# Patient Record
Sex: MALE | Race: WHITE | NOT HISPANIC OR LATINO | Employment: OTHER | URBAN - METROPOLITAN AREA
[De-identification: names, ages, dates, MRNs, and addresses within clinical notes are randomized per-mention and may not be internally consistent; named-entity substitution may affect disease eponyms.]

---

## 2023-03-06 ENCOUNTER — HOSPITAL ENCOUNTER (INPATIENT)
Facility: HOSPITAL | Age: 73
LOS: 3 days | Discharge: HOME | DRG: 286 | End: 2023-03-10
Attending: EMERGENCY MEDICINE | Admitting: STUDENT IN AN ORGANIZED HEALTH CARE EDUCATION/TRAINING PROGRAM
Payer: MEDICARE

## 2023-03-06 ENCOUNTER — APPOINTMENT (OUTPATIENT)
Dept: GENERAL RADIOLOGY | Facility: HOSPITAL | Age: 73
DRG: 286 | End: 2023-03-06
Payer: MEDICARE

## 2023-03-06 ENCOUNTER — APPOINTMENT (OUTPATIENT)
Dept: CT IMAGING | Facility: HOSPITAL | Age: 73
DRG: 286 | End: 2023-03-06
Payer: MEDICARE

## 2023-03-06 DIAGNOSIS — I50.9 CONGESTIVE HEART FAILURE, UNSPECIFIED HF CHRONICITY, UNSPECIFIED HEART FAILURE TYPE: ICD-10-CM

## 2023-03-06 DIAGNOSIS — R73.09 ELEVATED GLUCOSE: ICD-10-CM

## 2023-03-06 DIAGNOSIS — R06.00 DYSPNEA, UNSPECIFIED TYPE: Primary | ICD-10-CM

## 2023-03-06 DIAGNOSIS — R79.89 ELEVATED SERUM CREATININE: ICD-10-CM

## 2023-03-06 DIAGNOSIS — R09.89 PULMONARY VASCULAR CONGESTION: ICD-10-CM

## 2023-03-06 DIAGNOSIS — I51.7 CARDIOMEGALY: ICD-10-CM

## 2023-03-06 DIAGNOSIS — R77.8 ELEVATED TROPONIN: ICD-10-CM

## 2023-03-06 DIAGNOSIS — I26.99 ACUTE PULMONARY EMBOLISM WITHOUT ACUTE COR PULMONALE, UNSPECIFIED PULMONARY EMBOLISM TYPE: ICD-10-CM

## 2023-03-06 LAB
ALBUMIN SERPL-MCNC: 4.1 G/DL (ref 3.5–5.2)
ALBUMIN/GLOB SERPL: 1.2 G/DL
ALP SERPL-CCNC: 75 U/L (ref 39–117)
ALT SERPL W P-5'-P-CCNC: 34 U/L (ref 1–41)
ANION GAP SERPL CALCULATED.3IONS-SCNC: 10 MMOL/L (ref 5–15)
ARTERIAL PATENCY WRIST A: POSITIVE
AST SERPL-CCNC: 48 U/L (ref 1–40)
ATMOSPHERIC PRESS: ABNORMAL MM[HG]
B PARAPERT DNA SPEC QL NAA+PROBE: NOT DETECTED
B PERT DNA SPEC QL NAA+PROBE: NOT DETECTED
BASE EXCESS BLDA CALC-SCNC: -2 MMOL/L (ref 0–3)
BASOPHILS # BLD AUTO: 0 10*3/MM3 (ref 0–0.2)
BASOPHILS NFR BLD AUTO: 0.4 % (ref 0–1.5)
BDY SITE: ABNORMAL
BILIRUB SERPL-MCNC: 1.2 MG/DL (ref 0–1.2)
BUN SERPL-MCNC: 20 MG/DL (ref 8–23)
BUN/CREAT SERPL: 15.3 (ref 7–25)
C PNEUM DNA NPH QL NAA+NON-PROBE: NOT DETECTED
CALCIUM SPEC-SCNC: 8.9 MG/DL (ref 8.6–10.5)
CHLORIDE SERPL-SCNC: 100 MMOL/L (ref 98–107)
CO2 BLDA-SCNC: 21 MMOL/L (ref 22–29)
CO2 SERPL-SCNC: 25 MMOL/L (ref 22–29)
CREAT SERPL-MCNC: 1.31 MG/DL (ref 0.76–1.27)
D DIMER PPP FEU-MCNC: 3.1 MG/L (FEU) (ref 0–0.7)
D-LACTATE SERPL-SCNC: 2 MMOL/L (ref 0.3–2)
DEPRECATED RDW RBC AUTO: 51.6 FL (ref 37–54)
EGFRCR SERPLBLD CKD-EPI 2021: 58.6 ML/MIN/1.73
EOSINOPHIL # BLD AUTO: 0 10*3/MM3 (ref 0–0.4)
EOSINOPHIL NFR BLD AUTO: 0.1 % (ref 0.3–6.2)
ERYTHROCYTE [DISTWIDTH] IN BLOOD BY AUTOMATED COUNT: 16.4 % (ref 12.3–15.4)
FLUAV SUBTYP SPEC NAA+PROBE: NOT DETECTED
FLUBV RNA ISLT QL NAA+PROBE: NOT DETECTED
GLOBULIN UR ELPH-MCNC: 3.5 GM/DL
GLUCOSE SERPL-MCNC: 144 MG/DL (ref 65–99)
HADV DNA SPEC NAA+PROBE: NOT DETECTED
HCO3 BLDA-SCNC: 20.2 MMOL/L (ref 21–28)
HCOV 229E RNA SPEC QL NAA+PROBE: NOT DETECTED
HCOV HKU1 RNA SPEC QL NAA+PROBE: NOT DETECTED
HCOV NL63 RNA SPEC QL NAA+PROBE: NOT DETECTED
HCOV OC43 RNA SPEC QL NAA+PROBE: NOT DETECTED
HCT VFR BLD AUTO: 38.9 % (ref 37.5–51)
HEMODILUTION: NO
HGB BLD-MCNC: 12.6 G/DL (ref 13–17.7)
HMPV RNA NPH QL NAA+NON-PROBE: NOT DETECTED
HOLD SPECIMEN: NORMAL
HOLD SPECIMEN: NORMAL
HPIV1 RNA ISLT QL NAA+PROBE: NOT DETECTED
HPIV2 RNA SPEC QL NAA+PROBE: NOT DETECTED
HPIV3 RNA NPH QL NAA+PROBE: NOT DETECTED
HPIV4 P GENE NPH QL NAA+PROBE: NOT DETECTED
INHALED O2 CONCENTRATION: 21 %
LYMPHOCYTES # BLD AUTO: 1.6 10*3/MM3 (ref 0.7–3.1)
LYMPHOCYTES NFR BLD AUTO: 13.7 % (ref 19.6–45.3)
M PNEUMO IGG SER IA-ACNC: NOT DETECTED
MAGNESIUM SERPL-MCNC: 1.8 MG/DL (ref 1.6–2.4)
MCH RBC QN AUTO: 27.8 PG (ref 26.6–33)
MCHC RBC AUTO-ENTMCNC: 32.3 G/DL (ref 31.5–35.7)
MCV RBC AUTO: 86.1 FL (ref 79–97)
MODALITY: ABNORMAL
MONOCYTES # BLD AUTO: 1.6 10*3/MM3 (ref 0.1–0.9)
MONOCYTES NFR BLD AUTO: 13.2 % (ref 5–12)
NEUTROPHILS NFR BLD AUTO: 72.6 % (ref 42.7–76)
NEUTROPHILS NFR BLD AUTO: 8.5 10*3/MM3 (ref 1.7–7)
NRBC BLD AUTO-RTO: 0 /100 WBC (ref 0–0.2)
NT-PROBNP SERPL-MCNC: ABNORMAL PG/ML (ref 0–900)
PCO2 BLDA: 26.9 MM HG (ref 35–48)
PH BLDA: 7.48 PH UNITS (ref 7.35–7.45)
PLATELET # BLD AUTO: 160 10*3/MM3 (ref 140–450)
PMV BLD AUTO: 10.1 FL (ref 6–12)
PO2 BLDA: 76.5 MM HG (ref 83–108)
POTASSIUM SERPL-SCNC: 4.1 MMOL/L (ref 3.5–5.2)
PROT SERPL-MCNC: 7.6 G/DL (ref 6–8.5)
RBC # BLD AUTO: 4.52 10*6/MM3 (ref 4.14–5.8)
RHINOVIRUS RNA SPEC NAA+PROBE: NOT DETECTED
RSV RNA NPH QL NAA+NON-PROBE: NOT DETECTED
SAO2 % BLDCOA: 96.4 % (ref 94–98)
SARS-COV-2 RNA NPH QL NAA+NON-PROBE: NOT DETECTED
SODIUM SERPL-SCNC: 135 MMOL/L (ref 136–145)
TROPONIN T SERPL HS-MCNC: 34 NG/L
WBC NRBC COR # BLD: 11.7 10*3/MM3 (ref 3.4–10.8)
WHOLE BLOOD HOLD COAG: NORMAL
WHOLE BLOOD HOLD SPECIMEN: NORMAL

## 2023-03-06 PROCEDURE — 80053 COMPREHEN METABOLIC PANEL: CPT | Performed by: EMERGENCY MEDICINE

## 2023-03-06 PROCEDURE — 83605 ASSAY OF LACTIC ACID: CPT

## 2023-03-06 PROCEDURE — 93005 ELECTROCARDIOGRAM TRACING: CPT

## 2023-03-06 PROCEDURE — 71045 X-RAY EXAM CHEST 1 VIEW: CPT

## 2023-03-06 PROCEDURE — 93005 ELECTROCARDIOGRAM TRACING: CPT | Performed by: EMERGENCY MEDICINE

## 2023-03-06 PROCEDURE — 85379 FIBRIN DEGRADATION QUANT: CPT | Performed by: NURSE PRACTITIONER

## 2023-03-06 PROCEDURE — 99285 EMERGENCY DEPT VISIT HI MDM: CPT

## 2023-03-06 PROCEDURE — 82803 BLOOD GASES ANY COMBINATION: CPT

## 2023-03-06 PROCEDURE — 84484 ASSAY OF TROPONIN QUANT: CPT | Performed by: NURSE PRACTITIONER

## 2023-03-06 PROCEDURE — 36600 WITHDRAWAL OF ARTERIAL BLOOD: CPT

## 2023-03-06 PROCEDURE — 87040 BLOOD CULTURE FOR BACTERIA: CPT | Performed by: EMERGENCY MEDICINE

## 2023-03-06 PROCEDURE — 36415 COLL VENOUS BLD VENIPUNCTURE: CPT

## 2023-03-06 PROCEDURE — 0202U NFCT DS 22 TRGT SARS-COV-2: CPT | Performed by: NURSE PRACTITIONER

## 2023-03-06 PROCEDURE — 83880 ASSAY OF NATRIURETIC PEPTIDE: CPT | Performed by: NURSE PRACTITIONER

## 2023-03-06 PROCEDURE — 71275 CT ANGIOGRAPHY CHEST: CPT

## 2023-03-06 PROCEDURE — 85025 COMPLETE CBC W/AUTO DIFF WBC: CPT | Performed by: EMERGENCY MEDICINE

## 2023-03-06 PROCEDURE — 83735 ASSAY OF MAGNESIUM: CPT | Performed by: NURSE PRACTITIONER

## 2023-03-06 RX ORDER — SODIUM CHLORIDE 0.9 % (FLUSH) 0.9 %
10 SYRINGE (ML) INJECTION AS NEEDED
Status: DISCONTINUED | OUTPATIENT
Start: 2023-03-06 | End: 2023-03-09 | Stop reason: SDUPTHER

## 2023-03-07 ENCOUNTER — APPOINTMENT (OUTPATIENT)
Dept: NUCLEAR MEDICINE | Facility: HOSPITAL | Age: 73
DRG: 286 | End: 2023-03-07
Payer: MEDICARE

## 2023-03-07 ENCOUNTER — APPOINTMENT (OUTPATIENT)
Dept: CARDIOLOGY | Facility: HOSPITAL | Age: 73
DRG: 286 | End: 2023-03-07
Payer: MEDICARE

## 2023-03-07 PROBLEM — R73.09 ELEVATED GLUCOSE: Status: ACTIVE | Noted: 2023-03-07

## 2023-03-07 PROBLEM — I26.99 ACUTE PULMONARY EMBOLISM: Status: ACTIVE | Noted: 2023-03-07

## 2023-03-07 PROBLEM — R06.00 DYSPNEA, UNSPECIFIED TYPE: Status: ACTIVE | Noted: 2023-03-07

## 2023-03-07 PROBLEM — R79.89 ELEVATED SERUM CREATININE: Status: ACTIVE | Noted: 2023-03-07

## 2023-03-07 PROBLEM — I50.9 CONGESTIVE HEART FAILURE: Status: ACTIVE | Noted: 2023-03-06

## 2023-03-07 PROBLEM — R09.89 PULMONARY VASCULAR CONGESTION: Status: ACTIVE | Noted: 2023-03-07

## 2023-03-07 PROBLEM — R79.89 ELEVATED TROPONIN: Status: ACTIVE | Noted: 2023-03-06

## 2023-03-07 PROBLEM — R77.8 ELEVATED TROPONIN: Status: ACTIVE | Noted: 2023-03-06

## 2023-03-07 PROBLEM — I51.7 CARDIOMEGALY: Status: ACTIVE | Noted: 2023-03-07

## 2023-03-07 LAB
ANION GAP SERPL CALCULATED.3IONS-SCNC: 13 MMOL/L (ref 5–15)
BASOPHILS # BLD AUTO: 0.1 10*3/MM3 (ref 0–0.2)
BASOPHILS NFR BLD AUTO: 0.4 % (ref 0–1.5)
BH CV ECHO MEAS - ACS: 2.4 CM
BH CV ECHO MEAS - AO MAX PG: 3.8 MMHG
BH CV ECHO MEAS - AO MEAN PG: 2.09 MMHG
BH CV ECHO MEAS - AO ROOT DIAM: 3.6 CM
BH CV ECHO MEAS - AO V2 MAX: 97.3 CM/SEC
BH CV ECHO MEAS - AO V2 VTI: 14.6 CM
BH CV ECHO MEAS - AVA(I,D): 4 CM2
BH CV ECHO MEAS - EDV(CUBED): 333 ML
BH CV ECHO MEAS - EDV(MOD-SP4): 213 ML
BH CV ECHO MEAS - EF(MOD-BP): 20 %
BH CV ECHO MEAS - EF(MOD-SP4): 21.6 %
BH CV ECHO MEAS - ESV(CUBED): 239.3 ML
BH CV ECHO MEAS - ESV(MOD-SP4): 167 ML
BH CV ECHO MEAS - FS: 10.4 %
BH CV ECHO MEAS - IVS/LVPW: 1.12 CM
BH CV ECHO MEAS - IVSD: 1.11 CM
BH CV ECHO MEAS - LA A2CS (ATRIAL LENGTH): 5.9 CM
BH CV ECHO MEAS - LV DIASTOLIC VOL/BSA (35-75): 92.7 CM2
BH CV ECHO MEAS - LV MASS(C)D: 335.2 GRAMS
BH CV ECHO MEAS - LV MAX PG: 2.3 MMHG
BH CV ECHO MEAS - LV MEAN PG: 1.43 MMHG
BH CV ECHO MEAS - LV SYSTOLIC VOL/BSA (12-30): 72.6 CM2
BH CV ECHO MEAS - LV V1 MAX: 75.9 CM/SEC
BH CV ECHO MEAS - LV V1 VTI: 12.2 CM
BH CV ECHO MEAS - LVIDD: 6.9 CM
BH CV ECHO MEAS - LVIDS: 6.2 CM
BH CV ECHO MEAS - LVOT AREA: 4.8 CM2
BH CV ECHO MEAS - LVOT DIAM: 2.47 CM
BH CV ECHO MEAS - LVPWD: 0.99 CM
BH CV ECHO MEAS - MR MAX PG: 69.1 MMHG
BH CV ECHO MEAS - MR MAX VEL: 415.7 CM/SEC
BH CV ECHO MEAS - MV A MAX VEL: 71.4 CM/SEC
BH CV ECHO MEAS - MV DEC SLOPE: 1030 CM/SEC2
BH CV ECHO MEAS - MV DEC TIME: 0.11 MSEC
BH CV ECHO MEAS - MV E MAX VEL: 109.5 CM/SEC
BH CV ECHO MEAS - MV E/A: 1.53
BH CV ECHO MEAS - MV MAX PG: 3.4 MMHG
BH CV ECHO MEAS - MV MEAN PG: 1.47 MMHG
BH CV ECHO MEAS - MV V2 VTI: 15.3 CM
BH CV ECHO MEAS - MVA(VTI): 3.8 CM2
BH CV ECHO MEAS - PA V2 MAX: 64.9 CM/SEC
BH CV ECHO MEAS - PI END-D VEL: 137.8 CM/SEC
BH CV ECHO MEAS - QP/QS: 0.79
BH CV ECHO MEAS - RAP SYSTOLE: 3 MMHG
BH CV ECHO MEAS - RV MAX PG: 0.84 MMHG
BH CV ECHO MEAS - RV V1 MAX: 45.8 CM/SEC
BH CV ECHO MEAS - RV V1 VTI: 7.9 CM
BH CV ECHO MEAS - RVDD: 4.3 CM
BH CV ECHO MEAS - RVOT DIAM: 2.7 CM
BH CV ECHO MEAS - RVSP: 34.3 MMHG
BH CV ECHO MEAS - SI(MOD-SP4): 20.1 ML/M2
BH CV ECHO MEAS - SV(LVOT): 58.4 ML
BH CV ECHO MEAS - SV(MOD-SP4): 46.1 ML
BH CV ECHO MEAS - SV(RVOT): 45.9 ML
BH CV ECHO MEAS - TR MAX PG: 31.3 MMHG
BH CV ECHO MEAS - TR MAX VEL: 279.5 CM/SEC
BH CV LOW VAS LEFT GASTRONEMIUS VESSEL: 1
BH CV LOW VAS LEFT GREATER SAPH AK VESSEL: 1
BH CV LOW VAS LEFT GREATER SAPH BK VESSEL: 1
BH CV LOW VAS LEFT LESSER SAPH VESSEL: 1
BH CV LOW VAS LEFT VARICOSITY AK VESSEL: 1
BH CV LOW VAS LEFT VARICOSITY BK VESSEL: 1
BH CV LOW VAS RIGHT GREATER SAPH AK VESSEL: 1
BH CV LOW VAS RIGHT GREATER SAPH BK VESSEL: 1
BH CV LOWER VASCULAR LEFT COMMON FEMORAL AUGMENT: NORMAL
BH CV LOWER VASCULAR LEFT COMMON FEMORAL COMPETENT: NORMAL
BH CV LOWER VASCULAR LEFT COMMON FEMORAL COMPRESS: NORMAL
BH CV LOWER VASCULAR LEFT COMMON FEMORAL PHASIC: NORMAL
BH CV LOWER VASCULAR LEFT COMMON FEMORAL SPONT: NORMAL
BH CV LOWER VASCULAR LEFT DISTAL FEMORAL COMPRESS: NORMAL
BH CV LOWER VASCULAR LEFT GASTRONEMIUS COMPRESS: NORMAL
BH CV LOWER VASCULAR LEFT GASTRONEMIUS THROMBUS: NORMAL
BH CV LOWER VASCULAR LEFT GREATER SAPH AK COMPRESS: NORMAL
BH CV LOWER VASCULAR LEFT GREATER SAPH AK THROMBUS: NORMAL
BH CV LOWER VASCULAR LEFT GREATER SAPH BK COMPRESS: NORMAL
BH CV LOWER VASCULAR LEFT GREATER SAPH BK THROMBUS: NORMAL
BH CV LOWER VASCULAR LEFT LESSER SAPH COMPRESS: NORMAL
BH CV LOWER VASCULAR LEFT LESSER SAPH THROMBUS: NORMAL
BH CV LOWER VASCULAR LEFT MID FEMORAL AUGMENT: NORMAL
BH CV LOWER VASCULAR LEFT MID FEMORAL COMPETENT: NORMAL
BH CV LOWER VASCULAR LEFT MID FEMORAL COMPRESS: NORMAL
BH CV LOWER VASCULAR LEFT MID FEMORAL PHASIC: NORMAL
BH CV LOWER VASCULAR LEFT MID FEMORAL SPONT: NORMAL
BH CV LOWER VASCULAR LEFT PERONEAL COMPRESS: NORMAL
BH CV LOWER VASCULAR LEFT POPLITEAL AUGMENT: NORMAL
BH CV LOWER VASCULAR LEFT POPLITEAL COMPETENT: NORMAL
BH CV LOWER VASCULAR LEFT POPLITEAL COMPRESS: NORMAL
BH CV LOWER VASCULAR LEFT POPLITEAL PHASIC: NORMAL
BH CV LOWER VASCULAR LEFT POPLITEAL SPONT: NORMAL
BH CV LOWER VASCULAR LEFT POSTERIOR TIBIAL COMPRESS: NORMAL
BH CV LOWER VASCULAR LEFT PROXIMAL FEMORAL COMPRESS: NORMAL
BH CV LOWER VASCULAR LEFT SAPHENOFEMORAL JUNCTION COMPRESS: NORMAL
BH CV LOWER VASCULAR LEFT VARICOSITY AK COMPRESS: NORMAL
BH CV LOWER VASCULAR LEFT VARICOSITY AK THROMBUS: NORMAL
BH CV LOWER VASCULAR LEFT VARICOSITY BK COMPRESS: NORMAL
BH CV LOWER VASCULAR LEFT VARICOSITY BK THROMBUS: NORMAL
BH CV LOWER VASCULAR RIGHT COMMON FEMORAL AUGMENT: NORMAL
BH CV LOWER VASCULAR RIGHT COMMON FEMORAL COMPETENT: NORMAL
BH CV LOWER VASCULAR RIGHT COMMON FEMORAL COMPRESS: NORMAL
BH CV LOWER VASCULAR RIGHT COMMON FEMORAL PHASIC: NORMAL
BH CV LOWER VASCULAR RIGHT COMMON FEMORAL SPONT: NORMAL
BH CV LOWER VASCULAR RIGHT DISTAL FEMORAL COMPRESS: NORMAL
BH CV LOWER VASCULAR RIGHT GASTRONEMIUS COMPRESS: NORMAL
BH CV LOWER VASCULAR RIGHT GREATER SAPH AK COMPRESS: NORMAL
BH CV LOWER VASCULAR RIGHT GREATER SAPH AK THROMBUS: NORMAL
BH CV LOWER VASCULAR RIGHT GREATER SAPH BK COMPRESS: NORMAL
BH CV LOWER VASCULAR RIGHT GREATER SAPH BK THROMBUS: NORMAL
BH CV LOWER VASCULAR RIGHT LESSER SAPH COMPRESS: NORMAL
BH CV LOWER VASCULAR RIGHT MID FEMORAL AUGMENT: NORMAL
BH CV LOWER VASCULAR RIGHT MID FEMORAL COMPETENT: NORMAL
BH CV LOWER VASCULAR RIGHT MID FEMORAL COMPRESS: NORMAL
BH CV LOWER VASCULAR RIGHT MID FEMORAL PHASIC: NORMAL
BH CV LOWER VASCULAR RIGHT MID FEMORAL SPONT: NORMAL
BH CV LOWER VASCULAR RIGHT PERONEAL COMPRESS: NORMAL
BH CV LOWER VASCULAR RIGHT POPLITEAL AUGMENT: NORMAL
BH CV LOWER VASCULAR RIGHT POPLITEAL COMPETENT: NORMAL
BH CV LOWER VASCULAR RIGHT POPLITEAL COMPRESS: NORMAL
BH CV LOWER VASCULAR RIGHT POPLITEAL PHASIC: NORMAL
BH CV LOWER VASCULAR RIGHT POPLITEAL SPONT: NORMAL
BH CV LOWER VASCULAR RIGHT POSTERIOR TIBIAL COMPRESS: NORMAL
BH CV LOWER VASCULAR RIGHT PROXIMAL FEMORAL COMPRESS: NORMAL
BH CV LOWER VASCULAR RIGHT SAPHENOFEMORAL JUNCTION COMPRESS: NORMAL
BH CV LOWER VASCULAR RIGHT VARICOSITY BK COMPRESS: NORMAL
BH CV REST NUCLEAR ISOTOPE DOSE: 11 MCI
BH CV STRESS BP STAGE 1: NORMAL
BH CV STRESS BP STAGE 2: NORMAL
BH CV STRESS BP STAGE 3: NORMAL
BH CV STRESS BP STAGE 4: NORMAL
BH CV STRESS COMMENTS STAGE 1: NORMAL
BH CV STRESS COMMENTS STAGE 2: NORMAL
BH CV STRESS DOSE REGADENOSON STAGE 1: 0.4
BH CV STRESS DURATION MIN STAGE 1: 0
BH CV STRESS DURATION MIN STAGE 2: 4
BH CV STRESS DURATION SEC STAGE 1: 10
BH CV STRESS DURATION SEC STAGE 2: 0
BH CV STRESS HR STAGE 1: 94
BH CV STRESS HR STAGE 2: 98
BH CV STRESS HR STAGE 3: 97
BH CV STRESS HR STAGE 4: 97
BH CV STRESS NUCLEAR ISOTOPE DOSE: 33 MCI
BH CV STRESS PROTOCOL 1: NORMAL
BH CV STRESS RECOVERY BP: NORMAL MMHG
BH CV STRESS RECOVERY HR: 91 BPM
BH CV STRESS STAGE 1: 1
BH CV STRESS STAGE 2: 2
BH CV STRESS STAGE 3: 3
BH CV STRESS STAGE 4: 4
BUN SERPL-MCNC: 20 MG/DL (ref 8–23)
BUN/CREAT SERPL: 13.6 (ref 7–25)
CALCIUM SPEC-SCNC: 9 MG/DL (ref 8.6–10.5)
CHLORIDE SERPL-SCNC: 95 MMOL/L (ref 98–107)
CHOLEST SERPL-MCNC: 140 MG/DL (ref 0–200)
CO2 SERPL-SCNC: 25 MMOL/L (ref 22–29)
CREAT SERPL-MCNC: 1.47 MG/DL (ref 0.76–1.27)
DEPRECATED RDW RBC AUTO: 52.1 FL (ref 37–54)
EGFRCR SERPLBLD CKD-EPI 2021: 51 ML/MIN/1.73
EOSINOPHIL # BLD AUTO: 0 10*3/MM3 (ref 0–0.4)
EOSINOPHIL NFR BLD AUTO: 0 % (ref 0.3–6.2)
ERYTHROCYTE [DISTWIDTH] IN BLOOD BY AUTOMATED COUNT: 16.5 % (ref 12.3–15.4)
GEN 5 2HR TROPONIN T REFLEX: 30 NG/L
GLUCOSE SERPL-MCNC: 118 MG/DL (ref 65–99)
HBA1C MFR BLD: 6.4 % (ref 4.8–5.6)
HCT VFR BLD AUTO: 37.2 % (ref 37.5–51)
HDLC SERPL-MCNC: 28 MG/DL (ref 40–60)
HGB BLD-MCNC: 12.1 G/DL (ref 13–17.7)
LDLC SERPL CALC-MCNC: 98 MG/DL (ref 0–100)
LDLC/HDLC SERPL: 3.51 {RATIO}
LYMPHOCYTES # BLD AUTO: 2 10*3/MM3 (ref 0.7–3.1)
LYMPHOCYTES NFR BLD AUTO: 16.1 % (ref 19.6–45.3)
MAGNESIUM SERPL-MCNC: 1.8 MG/DL (ref 1.6–2.4)
MAXIMAL PREDICTED HEART RATE: 150 BPM
MCH RBC QN AUTO: 28 PG (ref 26.6–33)
MCHC RBC AUTO-ENTMCNC: 32.5 G/DL (ref 31.5–35.7)
MCV RBC AUTO: 86.3 FL (ref 79–97)
MONOCYTES # BLD AUTO: 1.7 10*3/MM3 (ref 0.1–0.9)
MONOCYTES NFR BLD AUTO: 13.8 % (ref 5–12)
NEUTROPHILS NFR BLD AUTO: 69.7 % (ref 42.7–76)
NEUTROPHILS NFR BLD AUTO: 8.7 10*3/MM3 (ref 1.7–7)
NRBC BLD AUTO-RTO: 0.1 /100 WBC (ref 0–0.2)
PERCENT MAX PREDICTED HR: 65.33 %
PLATELET # BLD AUTO: 161 10*3/MM3 (ref 140–450)
PMV BLD AUTO: 10.3 FL (ref 6–12)
POTASSIUM SERPL-SCNC: 3.8 MMOL/L (ref 3.5–5.2)
RBC # BLD AUTO: 4.32 10*6/MM3 (ref 4.14–5.8)
SODIUM SERPL-SCNC: 133 MMOL/L (ref 136–145)
STRESS BASELINE BP: NORMAL MMHG
STRESS BASELINE HR: 98 BPM
STRESS PERCENT HR: 77 %
STRESS POST PEAK BP: NORMAL MMHG
STRESS POST PEAK HR: 98 BPM
STRESS TARGET HR: 128 BPM
TRIGL SERPL-MCNC: 69 MG/DL (ref 0–150)
TROPONIN T DELTA: -4 NG/L
TSH SERPL DL<=0.05 MIU/L-ACNC: 4.59 UIU/ML (ref 0.27–4.2)
VLDLC SERPL-MCNC: 14 MG/DL (ref 5–40)
WBC NRBC COR # BLD: 12.5 10*3/MM3 (ref 3.4–10.8)

## 2023-03-07 PROCEDURE — 85025 COMPLETE CBC W/AUTO DIFF WBC: CPT

## 2023-03-07 PROCEDURE — 80048 BASIC METABOLIC PNL TOTAL CA: CPT

## 2023-03-07 PROCEDURE — 25010000002 ENOXAPARIN PER 10 MG: Performed by: EMERGENCY MEDICINE

## 2023-03-07 PROCEDURE — 87040 BLOOD CULTURE FOR BACTERIA: CPT | Performed by: EMERGENCY MEDICINE

## 2023-03-07 PROCEDURE — 25010000002 FUROSEMIDE PER 20 MG

## 2023-03-07 PROCEDURE — 93005 ELECTROCARDIOGRAM TRACING: CPT | Performed by: INTERNAL MEDICINE

## 2023-03-07 PROCEDURE — 25010000002 FUROSEMIDE PER 20 MG: Performed by: NURSE PRACTITIONER

## 2023-03-07 PROCEDURE — 25510000001 IOPAMIDOL PER 1 ML: Performed by: EMERGENCY MEDICINE

## 2023-03-07 PROCEDURE — 25010000002 REGADENOSON 0.4 MG/5ML SOLUTION: Performed by: INTERNAL MEDICINE

## 2023-03-07 PROCEDURE — 93970 EXTREMITY STUDY: CPT

## 2023-03-07 PROCEDURE — 78452 HT MUSCLE IMAGE SPECT MULT: CPT

## 2023-03-07 PROCEDURE — 0 TECHNETIUM TETROFOSMIN KIT: Performed by: INTERNAL MEDICINE

## 2023-03-07 PROCEDURE — 84484 ASSAY OF TROPONIN QUANT: CPT | Performed by: NURSE PRACTITIONER

## 2023-03-07 PROCEDURE — 93018 CV STRESS TEST I&R ONLY: CPT | Performed by: INTERNAL MEDICINE

## 2023-03-07 PROCEDURE — 99222 1ST HOSP IP/OBS MODERATE 55: CPT | Performed by: INTERNAL MEDICINE

## 2023-03-07 PROCEDURE — 93010 ELECTROCARDIOGRAM REPORT: CPT | Performed by: INTERNAL MEDICINE

## 2023-03-07 PROCEDURE — 83036 HEMOGLOBIN GLYCOSYLATED A1C: CPT

## 2023-03-07 PROCEDURE — 93306 TTE W/DOPPLER COMPLETE: CPT | Performed by: INTERNAL MEDICINE

## 2023-03-07 PROCEDURE — 78452 HT MUSCLE IMAGE SPECT MULT: CPT | Performed by: INTERNAL MEDICINE

## 2023-03-07 PROCEDURE — 83735 ASSAY OF MAGNESIUM: CPT | Performed by: INTERNAL MEDICINE

## 2023-03-07 PROCEDURE — 93306 TTE W/DOPPLER COMPLETE: CPT

## 2023-03-07 PROCEDURE — 80061 LIPID PANEL: CPT

## 2023-03-07 PROCEDURE — 84443 ASSAY THYROID STIM HORMONE: CPT

## 2023-03-07 PROCEDURE — 93017 CV STRESS TEST TRACING ONLY: CPT

## 2023-03-07 PROCEDURE — A9502 TC99M TETROFOSMIN: HCPCS | Performed by: INTERNAL MEDICINE

## 2023-03-07 RX ORDER — SODIUM CHLORIDE 0.9 % (FLUSH) 0.9 %
10 SYRINGE (ML) INJECTION EVERY 12 HOURS SCHEDULED
Status: DISCONTINUED | OUTPATIENT
Start: 2023-03-07 | End: 2023-03-10 | Stop reason: HOSPADM

## 2023-03-07 RX ORDER — SODIUM CHLORIDE 9 MG/ML
40 INJECTION, SOLUTION INTRAVENOUS AS NEEDED
Status: DISCONTINUED | OUTPATIENT
Start: 2023-03-07 | End: 2023-03-10 | Stop reason: HOSPADM

## 2023-03-07 RX ORDER — CHOLECALCIFEROL (VITAMIN D3) 125 MCG
5 CAPSULE ORAL NIGHTLY PRN
Status: DISCONTINUED | OUTPATIENT
Start: 2023-03-07 | End: 2023-03-10 | Stop reason: HOSPADM

## 2023-03-07 RX ORDER — ACETAMINOPHEN 650 MG/1
650 SUPPOSITORY RECTAL EVERY 4 HOURS PRN
Status: DISCONTINUED | OUTPATIENT
Start: 2023-03-07 | End: 2023-03-10 | Stop reason: HOSPADM

## 2023-03-07 RX ORDER — SODIUM CHLORIDE 0.9 % (FLUSH) 0.9 %
3 SYRINGE (ML) INJECTION EVERY 12 HOURS SCHEDULED
Status: DISCONTINUED | OUTPATIENT
Start: 2023-03-07 | End: 2023-03-10 | Stop reason: HOSPADM

## 2023-03-07 RX ORDER — ONDANSETRON 2 MG/ML
4 INJECTION INTRAMUSCULAR; INTRAVENOUS EVERY 6 HOURS PRN
Status: DISCONTINUED | OUTPATIENT
Start: 2023-03-07 | End: 2023-03-08

## 2023-03-07 RX ORDER — NITROGLYCERIN 0.4 MG/1
0.4 TABLET SUBLINGUAL
Status: DISCONTINUED | OUTPATIENT
Start: 2023-03-07 | End: 2023-03-10 | Stop reason: HOSPADM

## 2023-03-07 RX ORDER — ONDANSETRON 4 MG/1
4 TABLET, FILM COATED ORAL EVERY 6 HOURS PRN
Status: DISCONTINUED | OUTPATIENT
Start: 2023-03-07 | End: 2023-03-08

## 2023-03-07 RX ORDER — ACETAMINOPHEN 325 MG/1
650 TABLET ORAL EVERY 4 HOURS PRN
Status: DISCONTINUED | OUTPATIENT
Start: 2023-03-07 | End: 2023-03-10 | Stop reason: HOSPADM

## 2023-03-07 RX ORDER — SODIUM CHLORIDE 0.9 % (FLUSH) 0.9 %
10 SYRINGE (ML) INJECTION AS NEEDED
Status: DISCONTINUED | OUTPATIENT
Start: 2023-03-07 | End: 2023-03-10 | Stop reason: HOSPADM

## 2023-03-07 RX ORDER — FUROSEMIDE 10 MG/ML
40 INJECTION INTRAMUSCULAR; INTRAVENOUS ONCE
Status: COMPLETED | OUTPATIENT
Start: 2023-03-07 | End: 2023-03-07

## 2023-03-07 RX ORDER — FUROSEMIDE 20 MG/1
TABLET ORAL
COMMUNITY
Start: 2023-02-16 | End: 2023-03-10 | Stop reason: HOSPADM

## 2023-03-07 RX ORDER — POLYETHYLENE GLYCOL 3350 17 G/17G
17 POWDER, FOR SOLUTION ORAL DAILY PRN
Status: DISCONTINUED | OUTPATIENT
Start: 2023-03-07 | End: 2023-03-09

## 2023-03-07 RX ORDER — BISACODYL 5 MG/1
5 TABLET, DELAYED RELEASE ORAL DAILY PRN
Status: DISCONTINUED | OUTPATIENT
Start: 2023-03-07 | End: 2023-03-09

## 2023-03-07 RX ORDER — ENOXAPARIN SODIUM 150 MG/ML
105 INJECTION SUBCUTANEOUS EVERY 12 HOURS
Status: DISCONTINUED | OUTPATIENT
Start: 2023-03-07 | End: 2023-03-10 | Stop reason: HOSPADM

## 2023-03-07 RX ORDER — ACETAMINOPHEN 160 MG/5ML
650 SOLUTION ORAL EVERY 4 HOURS PRN
Status: DISCONTINUED | OUTPATIENT
Start: 2023-03-07 | End: 2023-03-10 | Stop reason: HOSPADM

## 2023-03-07 RX ORDER — ENOXAPARIN SODIUM 150 MG/ML
110 INJECTION SUBCUTANEOUS ONCE
Status: DISCONTINUED | OUTPATIENT
Start: 2023-03-07 | End: 2023-03-07

## 2023-03-07 RX ORDER — TRAZODONE HYDROCHLORIDE 50 MG/1
50 TABLET ORAL NIGHTLY
Status: DISCONTINUED | OUTPATIENT
Start: 2023-03-07 | End: 2023-03-10 | Stop reason: HOSPADM

## 2023-03-07 RX ORDER — SODIUM CHLORIDE 0.9 % (FLUSH) 0.9 %
3-10 SYRINGE (ML) INJECTION AS NEEDED
Status: DISCONTINUED | OUTPATIENT
Start: 2023-03-07 | End: 2023-03-10 | Stop reason: HOSPADM

## 2023-03-07 RX ORDER — FUROSEMIDE 10 MG/ML
20 INJECTION INTRAMUSCULAR; INTRAVENOUS EVERY 12 HOURS
Status: DISCONTINUED | OUTPATIENT
Start: 2023-03-07 | End: 2023-03-08

## 2023-03-07 RX ORDER — BISACODYL 10 MG
10 SUPPOSITORY, RECTAL RECTAL DAILY PRN
Status: DISCONTINUED | OUTPATIENT
Start: 2023-03-07 | End: 2023-03-09

## 2023-03-07 RX ORDER — AMOXICILLIN 250 MG
2 CAPSULE ORAL 2 TIMES DAILY
Status: DISCONTINUED | OUTPATIENT
Start: 2023-03-07 | End: 2023-03-09

## 2023-03-07 RX ORDER — TRAZODONE HYDROCHLORIDE 50 MG/1
TABLET ORAL
COMMUNITY
Start: 2023-02-16

## 2023-03-07 RX ORDER — CARVEDILOL 3.12 MG/1
3.12 TABLET ORAL 2 TIMES DAILY WITH MEALS
Status: DISCONTINUED | OUTPATIENT
Start: 2023-03-07 | End: 2023-03-09

## 2023-03-07 RX ADMIN — FUROSEMIDE 20 MG: 10 INJECTION, SOLUTION INTRAMUSCULAR; INTRAVENOUS at 21:12

## 2023-03-07 RX ADMIN — REGADENOSON 0.4 MG: 0.08 INJECTION, SOLUTION INTRAVENOUS at 10:18

## 2023-03-07 RX ADMIN — FUROSEMIDE 20 MG: 10 INJECTION, SOLUTION INTRAMUSCULAR; INTRAVENOUS at 11:42

## 2023-03-07 RX ADMIN — FUROSEMIDE 40 MG: 10 INJECTION, SOLUTION INTRAMUSCULAR; INTRAVENOUS at 01:24

## 2023-03-07 RX ADMIN — IOPAMIDOL 100 ML: 755 INJECTION, SOLUTION INTRAVENOUS at 00:02

## 2023-03-07 RX ADMIN — TETROFOSMIN 1 DOSE: 1.38 INJECTION, POWDER, LYOPHILIZED, FOR SOLUTION INTRAVENOUS at 10:18

## 2023-03-07 RX ADMIN — TETROFOSMIN 1 DOSE: 1.38 INJECTION, POWDER, LYOPHILIZED, FOR SOLUTION INTRAVENOUS at 09:21

## 2023-03-07 RX ADMIN — ENOXAPARIN SODIUM 105 MG: 150 INJECTION SUBCUTANEOUS at 16:30

## 2023-03-07 RX ADMIN — Medication 3 ML: at 21:13

## 2023-03-07 RX ADMIN — Medication 10 ML: at 21:13

## 2023-03-07 RX ADMIN — ENOXAPARIN SODIUM 105 MG: 150 INJECTION SUBCUTANEOUS at 01:22

## 2023-03-07 RX ADMIN — SENNOSIDES AND DOCUSATE SODIUM 2 TABLET: 50; 8.6 TABLET ORAL at 21:12

## 2023-03-07 RX ADMIN — Medication 10 ML: at 15:41

## 2023-03-07 RX ADMIN — TRAZODONE HYDROCHLORIDE 50 MG: 50 TABLET ORAL at 21:13

## 2023-03-08 LAB
ANION GAP SERPL CALCULATED.3IONS-SCNC: 13 MMOL/L (ref 5–15)
BASOPHILS # BLD AUTO: 0.1 10*3/MM3 (ref 0–0.2)
BASOPHILS NFR BLD AUTO: 0.7 % (ref 0–1.5)
BUN SERPL-MCNC: 22 MG/DL (ref 8–23)
BUN/CREAT SERPL: 17.2 (ref 7–25)
CALCIUM SPEC-SCNC: 8.7 MG/DL (ref 8.6–10.5)
CHLORIDE SERPL-SCNC: 98 MMOL/L (ref 98–107)
CO2 SERPL-SCNC: 24 MMOL/L (ref 22–29)
CREAT SERPL-MCNC: 1.28 MG/DL (ref 0.76–1.27)
DEPRECATED RDW RBC AUTO: 50.3 FL (ref 37–54)
EGFRCR SERPLBLD CKD-EPI 2021: 60.2 ML/MIN/1.73
EOSINOPHIL # BLD AUTO: 0 10*3/MM3 (ref 0–0.4)
EOSINOPHIL NFR BLD AUTO: 0.2 % (ref 0.3–6.2)
ERYTHROCYTE [DISTWIDTH] IN BLOOD BY AUTOMATED COUNT: 16.5 % (ref 12.3–15.4)
GLUCOSE SERPL-MCNC: 109 MG/DL (ref 65–99)
HCT VFR BLD AUTO: 36.5 % (ref 37.5–51)
HGB BLD-MCNC: 11.4 G/DL (ref 13–17.7)
INR PPP: 1.58 (ref 0.93–1.1)
LYMPHOCYTES # BLD AUTO: 1.2 10*3/MM3 (ref 0.7–3.1)
LYMPHOCYTES NFR BLD AUTO: 13.3 % (ref 19.6–45.3)
MAGNESIUM SERPL-MCNC: 1.8 MG/DL (ref 1.6–2.4)
MCH RBC QN AUTO: 27.4 PG (ref 26.6–33)
MCHC RBC AUTO-ENTMCNC: 31.3 G/DL (ref 31.5–35.7)
MCV RBC AUTO: 87.4 FL (ref 79–97)
MONOCYTES # BLD AUTO: 1.1 10*3/MM3 (ref 0.1–0.9)
MONOCYTES NFR BLD AUTO: 13.2 % (ref 5–12)
NEUTROPHILS NFR BLD AUTO: 6.3 10*3/MM3 (ref 1.7–7)
NEUTROPHILS NFR BLD AUTO: 72.6 % (ref 42.7–76)
NRBC BLD AUTO-RTO: 0 /100 WBC (ref 0–0.2)
PLATELET # BLD AUTO: 149 10*3/MM3 (ref 140–450)
PMV BLD AUTO: 10.9 FL (ref 6–12)
POTASSIUM SERPL-SCNC: 3.5 MMOL/L (ref 3.5–5.2)
PROTHROMBIN TIME: 15.9 SECONDS (ref 9.6–11.7)
RBC # BLD AUTO: 4.18 10*6/MM3 (ref 4.14–5.8)
SODIUM SERPL-SCNC: 135 MMOL/L (ref 136–145)
TSH SERPL DL<=0.05 MIU/L-ACNC: 2.83 UIU/ML (ref 0.27–4.2)
WBC NRBC COR # BLD: 8.6 10*3/MM3 (ref 3.4–10.8)

## 2023-03-08 PROCEDURE — C1894 INTRO/SHEATH, NON-LASER: HCPCS | Performed by: INTERNAL MEDICINE

## 2023-03-08 PROCEDURE — 93460 R&L HRT ART/VENTRICLE ANGIO: CPT | Performed by: INTERNAL MEDICINE

## 2023-03-08 PROCEDURE — 99233 SBSQ HOSP IP/OBS HIGH 50: CPT | Performed by: INTERNAL MEDICINE

## 2023-03-08 PROCEDURE — 80048 BASIC METABOLIC PNL TOTAL CA: CPT

## 2023-03-08 PROCEDURE — 25010000002 MIDAZOLAM PER 1 MG: Performed by: INTERNAL MEDICINE

## 2023-03-08 PROCEDURE — B2111ZZ FLUOROSCOPY OF MULTIPLE CORONARY ARTERIES USING LOW OSMOLAR CONTRAST: ICD-10-PCS | Performed by: INTERNAL MEDICINE

## 2023-03-08 PROCEDURE — 83735 ASSAY OF MAGNESIUM: CPT | Performed by: INTERNAL MEDICINE

## 2023-03-08 PROCEDURE — 25510000001 IOPAMIDOL PER 1 ML: Performed by: INTERNAL MEDICINE

## 2023-03-08 PROCEDURE — 99152 MOD SED SAME PHYS/QHP 5/>YRS: CPT | Performed by: INTERNAL MEDICINE

## 2023-03-08 PROCEDURE — 85025 COMPLETE CBC W/AUTO DIFF WBC: CPT

## 2023-03-08 PROCEDURE — 84443 ASSAY THYROID STIM HORMONE: CPT | Performed by: INTERNAL MEDICINE

## 2023-03-08 PROCEDURE — 85610 PROTHROMBIN TIME: CPT | Performed by: INTERNAL MEDICINE

## 2023-03-08 PROCEDURE — 25010000002 FENTANYL CITRATE (PF) 100 MCG/2ML SOLUTION: Performed by: INTERNAL MEDICINE

## 2023-03-08 PROCEDURE — 4A023N8 MEASUREMENT OF CARDIAC SAMPLING AND PRESSURE, BILATERAL, PERCUTANEOUS APPROACH: ICD-10-PCS | Performed by: INTERNAL MEDICINE

## 2023-03-08 PROCEDURE — 0 PHYTONADIONE 10 MG/ML SOLUTION: Performed by: INTERNAL MEDICINE

## 2023-03-08 PROCEDURE — C1769 GUIDE WIRE: HCPCS | Performed by: INTERNAL MEDICINE

## 2023-03-08 RX ORDER — ONDANSETRON 2 MG/ML
4 INJECTION INTRAMUSCULAR; INTRAVENOUS EVERY 6 HOURS PRN
Status: DISCONTINUED | OUTPATIENT
Start: 2023-03-08 | End: 2023-03-10 | Stop reason: HOSPADM

## 2023-03-08 RX ORDER — FENTANYL CITRATE 50 UG/ML
INJECTION, SOLUTION INTRAMUSCULAR; INTRAVENOUS
Status: DISCONTINUED | OUTPATIENT
Start: 2023-03-08 | End: 2023-03-08 | Stop reason: HOSPADM

## 2023-03-08 RX ORDER — ONDANSETRON 4 MG/1
4 TABLET, FILM COATED ORAL EVERY 6 HOURS PRN
Status: DISCONTINUED | OUTPATIENT
Start: 2023-03-08 | End: 2023-03-10 | Stop reason: HOSPADM

## 2023-03-08 RX ORDER — ACETAMINOPHEN 325 MG/1
650 TABLET ORAL EVERY 4 HOURS PRN
Status: DISCONTINUED | OUTPATIENT
Start: 2023-03-08 | End: 2023-03-08

## 2023-03-08 RX ORDER — SPIRONOLACTONE 25 MG/1
25 TABLET ORAL DAILY
Status: DISCONTINUED | OUTPATIENT
Start: 2023-03-08 | End: 2023-03-10 | Stop reason: HOSPADM

## 2023-03-08 RX ORDER — SODIUM CHLORIDE 9 MG/ML
250 INJECTION, SOLUTION INTRAVENOUS ONCE AS NEEDED
Status: DISCONTINUED | OUTPATIENT
Start: 2023-03-08 | End: 2023-03-10 | Stop reason: HOSPADM

## 2023-03-08 RX ORDER — SODIUM CHLORIDE 9 MG/ML
75 INJECTION, SOLUTION INTRAVENOUS CONTINUOUS
Status: DISCONTINUED | OUTPATIENT
Start: 2023-03-08 | End: 2023-03-08

## 2023-03-08 RX ORDER — MIDAZOLAM HYDROCHLORIDE 1 MG/ML
INJECTION INTRAMUSCULAR; INTRAVENOUS
Status: DISCONTINUED | OUTPATIENT
Start: 2023-03-08 | End: 2023-03-08 | Stop reason: HOSPADM

## 2023-03-08 RX ORDER — TORSEMIDE 10 MG/1
20 TABLET ORAL DAILY
Status: DISCONTINUED | OUTPATIENT
Start: 2023-03-09 | End: 2023-03-10 | Stop reason: HOSPADM

## 2023-03-08 RX ORDER — PHYTONADIONE 10 MG/ML
5 INJECTION, EMULSION INTRAMUSCULAR; INTRAVENOUS; SUBCUTANEOUS ONCE
Status: COMPLETED | OUTPATIENT
Start: 2023-03-08 | End: 2023-03-08

## 2023-03-08 RX ORDER — DIPHENHYDRAMINE HCL 25 MG
25 CAPSULE ORAL EVERY 6 HOURS PRN
Status: DISCONTINUED | OUTPATIENT
Start: 2023-03-08 | End: 2023-03-10 | Stop reason: HOSPADM

## 2023-03-08 RX ORDER — LIDOCAINE HYDROCHLORIDE 20 MG/ML
INJECTION, SOLUTION INFILTRATION; PERINEURAL
Status: DISCONTINUED | OUTPATIENT
Start: 2023-03-08 | End: 2023-03-08 | Stop reason: HOSPADM

## 2023-03-08 RX ADMIN — SODIUM CHLORIDE 75 ML/HR: 9 INJECTION, SOLUTION INTRAVENOUS at 08:21

## 2023-03-08 RX ADMIN — Medication 10 ML: at 08:20

## 2023-03-08 RX ADMIN — PHYTONADIONE 5 MG: 10 INJECTION, EMULSION INTRAMUSCULAR; INTRAVENOUS; SUBCUTANEOUS at 07:51

## 2023-03-08 RX ADMIN — Medication 3 ML: at 08:20

## 2023-03-08 RX ADMIN — SPIRONOLACTONE 25 MG: 25 TABLET ORAL at 12:57

## 2023-03-08 RX ADMIN — TRAZODONE HYDROCHLORIDE 50 MG: 50 TABLET ORAL at 20:18

## 2023-03-08 RX ADMIN — Medication 10 ML: at 20:19

## 2023-03-08 RX ADMIN — CARVEDILOL 3.12 MG: 3.12 TABLET, FILM COATED ORAL at 07:51

## 2023-03-08 RX ADMIN — Medication 3 ML: at 20:18

## 2023-03-09 PROBLEM — I50.21 ACUTE HFREF (HEART FAILURE WITH REDUCED EJECTION FRACTION): Status: ACTIVE | Noted: 2023-03-09

## 2023-03-09 LAB
ALBUMIN SERPL-MCNC: 3.2 G/DL (ref 3.5–5.2)
ANION GAP SERPL CALCULATED.3IONS-SCNC: 11 MMOL/L (ref 5–15)
BASOPHILS # BLD AUTO: 0.1 10*3/MM3 (ref 0–0.2)
BASOPHILS NFR BLD AUTO: 0.9 % (ref 0–1.5)
BUN SERPL-MCNC: 23 MG/DL (ref 8–23)
BUN/CREAT SERPL: 21.1 (ref 7–25)
CALCIUM SPEC-SCNC: 8.4 MG/DL (ref 8.6–10.5)
CHLORIDE SERPL-SCNC: 100 MMOL/L (ref 98–107)
CO2 SERPL-SCNC: 25 MMOL/L (ref 22–29)
CREAT SERPL-MCNC: 1.09 MG/DL (ref 0.76–1.27)
DEPRECATED RDW RBC AUTO: 49.9 FL (ref 37–54)
EGFRCR SERPLBLD CKD-EPI 2021: 73 ML/MIN/1.73
EOSINOPHIL # BLD AUTO: 0 10*3/MM3 (ref 0–0.4)
EOSINOPHIL NFR BLD AUTO: 0.3 % (ref 0.3–6.2)
ERYTHROCYTE [DISTWIDTH] IN BLOOD BY AUTOMATED COUNT: 16.5 % (ref 12.3–15.4)
GLUCOSE SERPL-MCNC: 127 MG/DL (ref 65–99)
HCT VFR BLD AUTO: 36.9 % (ref 37.5–51)
HGB BLD-MCNC: 11.7 G/DL (ref 13–17.7)
INR PPP: 1.33 (ref 0.93–1.1)
LYMPHOCYTES # BLD AUTO: 1.2 10*3/MM3 (ref 0.7–3.1)
LYMPHOCYTES NFR BLD AUTO: 16.8 % (ref 19.6–45.3)
MAGNESIUM SERPL-MCNC: 2 MG/DL (ref 1.6–2.4)
MCH RBC QN AUTO: 27.4 PG (ref 26.6–33)
MCHC RBC AUTO-ENTMCNC: 31.6 G/DL (ref 31.5–35.7)
MCV RBC AUTO: 86.8 FL (ref 79–97)
MONOCYTES # BLD AUTO: 0.9 10*3/MM3 (ref 0.1–0.9)
MONOCYTES NFR BLD AUTO: 12.2 % (ref 5–12)
NEUTROPHILS NFR BLD AUTO: 5 10*3/MM3 (ref 1.7–7)
NEUTROPHILS NFR BLD AUTO: 69.8 % (ref 42.7–76)
NRBC BLD AUTO-RTO: 0.1 /100 WBC (ref 0–0.2)
PHOSPHATE SERPL-MCNC: 3 MG/DL (ref 2.5–4.5)
PLATELET # BLD AUTO: 152 10*3/MM3 (ref 140–450)
PMV BLD AUTO: 10.5 FL (ref 6–12)
POTASSIUM SERPL-SCNC: 3.6 MMOL/L (ref 3.5–5.2)
PROTHROMBIN TIME: 13.5 SECONDS (ref 9.6–11.7)
QT INTERVAL: 368 MS
RBC # BLD AUTO: 4.25 10*6/MM3 (ref 4.14–5.8)
SODIUM SERPL-SCNC: 136 MMOL/L (ref 136–145)
WBC NRBC COR # BLD: 7.2 10*3/MM3 (ref 3.4–10.8)

## 2023-03-09 PROCEDURE — 83735 ASSAY OF MAGNESIUM: CPT | Performed by: INTERNAL MEDICINE

## 2023-03-09 PROCEDURE — 85027 COMPLETE CBC AUTOMATED: CPT | Performed by: INTERNAL MEDICINE

## 2023-03-09 PROCEDURE — 25010000002 ENOXAPARIN PER 10 MG: Performed by: INTERNAL MEDICINE

## 2023-03-09 PROCEDURE — 87324 CLOSTRIDIUM AG IA: CPT | Performed by: INTERNAL MEDICINE

## 2023-03-09 PROCEDURE — 87449 NOS EACH ORGANISM AG IA: CPT | Performed by: INTERNAL MEDICINE

## 2023-03-09 PROCEDURE — 85025 COMPLETE CBC W/AUTO DIFF WBC: CPT | Performed by: INTERNAL MEDICINE

## 2023-03-09 PROCEDURE — 93010 ELECTROCARDIOGRAM REPORT: CPT | Performed by: INTERNAL MEDICINE

## 2023-03-09 PROCEDURE — 80048 BASIC METABOLIC PNL TOTAL CA: CPT | Performed by: INTERNAL MEDICINE

## 2023-03-09 PROCEDURE — 97162 PT EVAL MOD COMPLEX 30 MIN: CPT

## 2023-03-09 PROCEDURE — 94618 PULMONARY STRESS TESTING: CPT

## 2023-03-09 PROCEDURE — 85610 PROTHROMBIN TIME: CPT | Performed by: INTERNAL MEDICINE

## 2023-03-09 PROCEDURE — 93005 ELECTROCARDIOGRAM TRACING: CPT | Performed by: INTERNAL MEDICINE

## 2023-03-09 PROCEDURE — 99233 SBSQ HOSP IP/OBS HIGH 50: CPT | Performed by: INTERNAL MEDICINE

## 2023-03-09 PROCEDURE — 80069 RENAL FUNCTION PANEL: CPT | Performed by: INTERNAL MEDICINE

## 2023-03-09 RX ORDER — CARVEDILOL 3.12 MG/1
3.12 TABLET ORAL 2 TIMES DAILY WITH MEALS
Status: DISCONTINUED | OUTPATIENT
Start: 2023-03-09 | End: 2023-03-10 | Stop reason: HOSPADM

## 2023-03-09 RX ORDER — WARFARIN SODIUM 3 MG/1
3 TABLET ORAL
Status: DISCONTINUED | OUTPATIENT
Start: 2023-03-09 | End: 2023-03-10 | Stop reason: HOSPADM

## 2023-03-09 RX ORDER — CARVEDILOL 6.25 MG/1
6.25 TABLET ORAL 2 TIMES DAILY WITH MEALS
Status: DISCONTINUED | OUTPATIENT
Start: 2023-03-09 | End: 2023-03-09

## 2023-03-09 RX ORDER — LISINOPRIL 5 MG/1
2.5 TABLET ORAL
Status: DISCONTINUED | OUTPATIENT
Start: 2023-03-09 | End: 2023-03-10

## 2023-03-09 RX ADMIN — WARFARIN SODIUM 3 MG: 3 TABLET ORAL at 18:14

## 2023-03-09 RX ADMIN — SPIRONOLACTONE 25 MG: 25 TABLET ORAL at 08:13

## 2023-03-09 RX ADMIN — CARVEDILOL 3.12 MG: 3.12 TABLET, FILM COATED ORAL at 18:14

## 2023-03-09 RX ADMIN — Medication 3 ML: at 21:10

## 2023-03-09 RX ADMIN — TRAZODONE HYDROCHLORIDE 50 MG: 50 TABLET ORAL at 21:10

## 2023-03-09 RX ADMIN — CARVEDILOL 3.12 MG: 3.12 TABLET, FILM COATED ORAL at 08:12

## 2023-03-09 RX ADMIN — TORSEMIDE 20 MG: 10 TABLET ORAL at 08:12

## 2023-03-09 RX ADMIN — Medication 10 ML: at 08:11

## 2023-03-09 RX ADMIN — Medication 10 ML: at 21:10

## 2023-03-09 RX ADMIN — ENOXAPARIN SODIUM 105 MG: 150 INJECTION SUBCUTANEOUS at 02:14

## 2023-03-09 RX ADMIN — ENOXAPARIN SODIUM 105 MG: 150 INJECTION SUBCUTANEOUS at 14:48

## 2023-03-09 RX ADMIN — LISINOPRIL 2.5 MG: 5 TABLET ORAL at 08:13

## 2023-03-10 ENCOUNTER — READMISSION MANAGEMENT (OUTPATIENT)
Dept: CALL CENTER | Facility: HOSPITAL | Age: 73
End: 2023-03-10

## 2023-03-10 VITALS
HEIGHT: 71 IN | WEIGHT: 239.5 LBS | DIASTOLIC BLOOD PRESSURE: 65 MMHG | RESPIRATION RATE: 14 BRPM | SYSTOLIC BLOOD PRESSURE: 92 MMHG | TEMPERATURE: 98.6 F | HEART RATE: 78 BPM | OXYGEN SATURATION: 94 % | BODY MASS INDEX: 33.53 KG/M2

## 2023-03-10 LAB
ANION GAP SERPL CALCULATED.3IONS-SCNC: 10 MMOL/L (ref 5–15)
BUN SERPL-MCNC: 24 MG/DL (ref 8–23)
BUN/CREAT SERPL: 18.2 (ref 7–25)
C DIFF GDH + TOXINS A+B STL QL IA.RAPID: NEGATIVE
C DIFF GDH + TOXINS A+B STL QL IA.RAPID: NEGATIVE
CALCIUM SPEC-SCNC: 8.7 MG/DL (ref 8.6–10.5)
CHLORIDE SERPL-SCNC: 99 MMOL/L (ref 98–107)
CO2 SERPL-SCNC: 28 MMOL/L (ref 22–29)
CREAT SERPL-MCNC: 1.32 MG/DL (ref 0.76–1.27)
DEPRECATED RDW RBC AUTO: 52.5 FL (ref 37–54)
EGFRCR SERPLBLD CKD-EPI 2021: 58 ML/MIN/1.73
ERYTHROCYTE [DISTWIDTH] IN BLOOD BY AUTOMATED COUNT: 16.6 % (ref 12.3–15.4)
GLUCOSE SERPL-MCNC: 111 MG/DL (ref 65–99)
HCT VFR BLD AUTO: 37.2 % (ref 37.5–51)
HGB BLD-MCNC: 12.1 G/DL (ref 13–17.7)
INR PPP: 1.37 (ref 0.93–1.1)
MAGNESIUM SERPL-MCNC: 2 MG/DL (ref 1.6–2.4)
MCH RBC QN AUTO: 27.9 PG (ref 26.6–33)
MCHC RBC AUTO-ENTMCNC: 32.4 G/DL (ref 31.5–35.7)
MCV RBC AUTO: 86.1 FL (ref 79–97)
PLATELET # BLD AUTO: 166 10*3/MM3 (ref 140–450)
PMV BLD AUTO: 10.4 FL (ref 6–12)
POTASSIUM SERPL-SCNC: 4 MMOL/L (ref 3.5–5.2)
PROTHROMBIN TIME: 13.9 SECONDS (ref 9.6–11.7)
RBC # BLD AUTO: 4.32 10*6/MM3 (ref 4.14–5.8)
SODIUM SERPL-SCNC: 137 MMOL/L (ref 136–145)
WBC NRBC COR # BLD: 6.7 10*3/MM3 (ref 3.4–10.8)

## 2023-03-10 PROCEDURE — 25010000002 ENOXAPARIN PER 10 MG: Performed by: INTERNAL MEDICINE

## 2023-03-10 PROCEDURE — 99233 SBSQ HOSP IP/OBS HIGH 50: CPT | Performed by: INTERNAL MEDICINE

## 2023-03-10 RX ORDER — SPIRONOLACTONE 25 MG/1
25 TABLET ORAL DAILY
Qty: 30 TABLET | Refills: 0 | Status: SHIPPED | OUTPATIENT
Start: 2023-03-11 | End: 2023-03-23 | Stop reason: SDUPTHER

## 2023-03-10 RX ORDER — WARFARIN SODIUM 3 MG/1
TABLET ORAL
Qty: 30 TABLET | Refills: 0 | Status: SHIPPED | OUTPATIENT
Start: 2023-03-10 | End: 2023-03-23 | Stop reason: SDUPTHER

## 2023-03-10 RX ORDER — CARVEDILOL 3.12 MG/1
3.12 TABLET ORAL 2 TIMES DAILY WITH MEALS
Qty: 60 TABLET | Refills: 0 | Status: SHIPPED | OUTPATIENT
Start: 2023-03-10 | End: 2023-03-23 | Stop reason: SDUPTHER

## 2023-03-10 RX ORDER — DIPHENOXYLATE HYDROCHLORIDE AND ATROPINE SULFATE 2.5; .025 MG/1; MG/1
1 TABLET ORAL
Status: DISCONTINUED | OUTPATIENT
Start: 2023-03-10 | End: 2023-03-10 | Stop reason: HOSPADM

## 2023-03-10 RX ORDER — ENOXAPARIN SODIUM 150 MG/ML
105 INJECTION SUBCUTANEOUS EVERY 12 HOURS
Qty: 11.2 ML | Refills: 0 | Status: SHIPPED | OUTPATIENT
Start: 2023-03-10

## 2023-03-10 RX ORDER — TORSEMIDE 20 MG/1
20 TABLET ORAL DAILY
Qty: 30 TABLET | Refills: 0 | Status: SHIPPED | OUTPATIENT
Start: 2023-03-11 | End: 2023-03-23 | Stop reason: SDUPTHER

## 2023-03-10 RX ADMIN — ENOXAPARIN SODIUM 105 MG: 150 INJECTION SUBCUTANEOUS at 01:53

## 2023-03-10 RX ADMIN — Medication 3 ML: at 08:44

## 2023-03-10 RX ADMIN — Medication 10 ML: at 08:46

## 2023-03-10 RX ADMIN — ENOXAPARIN SODIUM 105 MG: 150 INJECTION SUBCUTANEOUS at 13:43

## 2023-03-11 LAB — BACTERIA SPEC AEROBE CULT: NORMAL

## 2023-03-12 LAB
BACTERIA SPEC AEROBE CULT: NORMAL
QT INTERVAL: 398 MS

## 2023-03-13 LAB — QT INTERVAL: 408 MS

## 2023-03-14 ENCOUNTER — READMISSION MANAGEMENT (OUTPATIENT)
Dept: CALL CENTER | Facility: HOSPITAL | Age: 73
End: 2023-03-14

## 2023-03-16 ENCOUNTER — ANTICOAGULATION VISIT (OUTPATIENT)
Dept: CARDIOLOGY | Facility: CLINIC | Age: 73
End: 2023-03-16

## 2023-03-16 ENCOUNTER — OFFICE VISIT (OUTPATIENT)
Dept: CARDIOLOGY | Facility: CLINIC | Age: 73
End: 2023-03-16

## 2023-03-16 VITALS
DIASTOLIC BLOOD PRESSURE: 54 MMHG | BODY MASS INDEX: 33.46 KG/M2 | HEIGHT: 71 IN | HEART RATE: 84 BPM | SYSTOLIC BLOOD PRESSURE: 89 MMHG | OXYGEN SATURATION: 97 % | WEIGHT: 239 LBS

## 2023-03-16 DIAGNOSIS — I48.91 ATRIAL FIBRILLATION, UNSPECIFIED TYPE: ICD-10-CM

## 2023-03-16 DIAGNOSIS — Z79.01 LONG TERM (CURRENT) USE OF ANTICOAGULANTS: Primary | ICD-10-CM

## 2023-03-16 DIAGNOSIS — Z79.01 CHRONIC ANTICOAGULATION: ICD-10-CM

## 2023-03-16 DIAGNOSIS — I42.8 NICM (NONISCHEMIC CARDIOMYOPATHY): Primary | ICD-10-CM

## 2023-03-16 LAB — INR PPP: 2 (ref 0.9–1.1)

## 2023-03-16 PROCEDURE — 99214 OFFICE O/P EST MOD 30 MIN: CPT | Performed by: INTERNAL MEDICINE

## 2023-03-23 ENCOUNTER — READMISSION MANAGEMENT (OUTPATIENT)
Dept: CALL CENTER | Facility: HOSPITAL | Age: 73
End: 2023-03-23

## 2023-03-23 ENCOUNTER — ANTICOAGULATION VISIT (OUTPATIENT)
Dept: CARDIOLOGY | Facility: CLINIC | Age: 73
End: 2023-03-23

## 2023-03-23 VITALS
WEIGHT: 231 LBS | HEART RATE: 93 BPM | DIASTOLIC BLOOD PRESSURE: 72 MMHG | SYSTOLIC BLOOD PRESSURE: 101 MMHG | BODY MASS INDEX: 32.22 KG/M2

## 2023-03-23 DIAGNOSIS — I48.91 ATRIAL FIBRILLATION, UNSPECIFIED TYPE: ICD-10-CM

## 2023-03-23 DIAGNOSIS — I42.8 NICM (NONISCHEMIC CARDIOMYOPATHY): ICD-10-CM

## 2023-03-23 DIAGNOSIS — I50.9 ACUTE CONGESTIVE HEART FAILURE, UNSPECIFIED HEART FAILURE TYPE: ICD-10-CM

## 2023-03-23 DIAGNOSIS — Z79.01 LONG TERM (CURRENT) USE OF ANTICOAGULANTS: Primary | ICD-10-CM

## 2023-03-23 LAB — INR PPP: 2.1 (ref 0.9–1.1)

## 2023-03-23 PROCEDURE — 36416 COLLJ CAPILLARY BLOOD SPEC: CPT | Performed by: INTERNAL MEDICINE

## 2023-03-23 PROCEDURE — 85610 PROTHROMBIN TIME: CPT | Performed by: INTERNAL MEDICINE

## 2023-03-23 RX ORDER — SPIRONOLACTONE 25 MG/1
25 TABLET ORAL DAILY
Qty: 30 TABLET | Refills: 2 | Status: SHIPPED | OUTPATIENT
Start: 2023-03-23

## 2023-03-23 RX ORDER — WARFARIN SODIUM 3 MG/1
TABLET ORAL
Qty: 30 TABLET | Refills: 2 | Status: SHIPPED | OUTPATIENT
Start: 2023-03-23

## 2023-03-23 RX ORDER — CARVEDILOL 3.12 MG/1
3.12 TABLET ORAL 2 TIMES DAILY WITH MEALS
Qty: 60 TABLET | Refills: 2 | Status: SHIPPED | OUTPATIENT
Start: 2023-03-23

## 2023-03-23 RX ORDER — TORSEMIDE 20 MG/1
20 TABLET ORAL DAILY
Qty: 30 TABLET | Refills: 2 | Status: SHIPPED | OUTPATIENT
Start: 2023-03-23

## 2023-03-28 ENCOUNTER — READMISSION MANAGEMENT (OUTPATIENT)
Dept: CALL CENTER | Facility: HOSPITAL | Age: 73
End: 2023-03-28

## 2023-04-27 ENCOUNTER — HOSPITAL ENCOUNTER (OUTPATIENT)
Dept: CARDIOLOGY | Facility: HOSPITAL | Age: 73
Discharge: HOME | End: 2023-04-27

## 2023-04-27 ENCOUNTER — OFFICE VISIT (OUTPATIENT)
Dept: CARDIOLOGY | Facility: CLINIC | Age: 73
End: 2023-04-27

## 2023-04-27 ENCOUNTER — ANTICOAGULATION VISIT (OUTPATIENT)
Dept: CARDIOLOGY | Facility: CLINIC | Age: 73
End: 2023-04-27

## 2023-04-27 VITALS
HEART RATE: 85 BPM | WEIGHT: 229 LBS | HEIGHT: 71 IN | BODY MASS INDEX: 32.06 KG/M2 | DIASTOLIC BLOOD PRESSURE: 76 MMHG | SYSTOLIC BLOOD PRESSURE: 110 MMHG | OXYGEN SATURATION: 96 %

## 2023-04-27 VITALS
DIASTOLIC BLOOD PRESSURE: 76 MMHG | WEIGHT: 229 LBS | OXYGEN SATURATION: 96 % | HEART RATE: 85 BPM | HEIGHT: 71 IN | SYSTOLIC BLOOD PRESSURE: 110 MMHG | BODY MASS INDEX: 32.06 KG/M2

## 2023-04-27 DIAGNOSIS — I42.8 NICM (NONISCHEMIC CARDIOMYOPATHY): ICD-10-CM

## 2023-04-27 DIAGNOSIS — I48.91 ATRIAL FIBRILLATION, UNSPECIFIED TYPE: ICD-10-CM

## 2023-04-27 DIAGNOSIS — Z79.01 LONG TERM (CURRENT) USE OF ANTICOAGULANTS: Primary | ICD-10-CM

## 2023-04-27 DIAGNOSIS — I50.9 ACUTE CONGESTIVE HEART FAILURE, UNSPECIFIED HEART FAILURE TYPE: ICD-10-CM

## 2023-04-27 DIAGNOSIS — Z79.01 CHRONIC ANTICOAGULATION: ICD-10-CM

## 2023-04-27 LAB
BH CV ECHO MEAS - ACS: 2.38 CM
BH CV ECHO MEAS - AO MAX PG: 2.46 MMHG
BH CV ECHO MEAS - AO MEAN PG: 1.37 MMHG
BH CV ECHO MEAS - AO ROOT DIAM: 3.6 CM
BH CV ECHO MEAS - AO V2 MAX: 78.3 CM/SEC
BH CV ECHO MEAS - AO V2 VTI: 13.1 CM
BH CV ECHO MEAS - AVA(I,D): 2.18 CM2
BH CV ECHO MEAS - EDV(CUBED): 275.6 ML
BH CV ECHO MEAS - EDV(MOD-SP4): 197.4 ML
BH CV ECHO MEAS - EF(MOD-BP): 17 %
BH CV ECHO MEAS - EF(MOD-SP4): 17.3 %
BH CV ECHO MEAS - ESV(CUBED): 219.6 ML
BH CV ECHO MEAS - ESV(MOD-SP4): 163.3 ML
BH CV ECHO MEAS - FS: 7.3 %
BH CV ECHO MEAS - IVS/LVPW: 1.13 CM
BH CV ECHO MEAS - IVSD: 0.88 CM
BH CV ECHO MEAS - LA DIMENSION: 5 CM
BH CV ECHO MEAS - LV DIASTOLIC VOL/BSA (35-75): 88.1 CM2
BH CV ECHO MEAS - LV MASS(C)D: 225.2 GRAMS
BH CV ECHO MEAS - LV MAX PG: 0.71 MMHG
BH CV ECHO MEAS - LV MEAN PG: 0.45 MMHG
BH CV ECHO MEAS - LV SYSTOLIC VOL/BSA (12-30): 72.8 CM2
BH CV ECHO MEAS - LV V1 MAX: 42.1 CM/SEC
BH CV ECHO MEAS - LV V1 VTI: 7.4 CM
BH CV ECHO MEAS - LVIDD: 6.5 CM
BH CV ECHO MEAS - LVIDS: 6 CM
BH CV ECHO MEAS - LVOT AREA: 3.9 CM2
BH CV ECHO MEAS - LVOT DIAM: 2.23 CM
BH CV ECHO MEAS - LVPWD: 0.78 CM
BH CV ECHO MEAS - MR MAX PG: 61.7 MMHG
BH CV ECHO MEAS - MR MAX VEL: 392.5 CM/SEC
BH CV ECHO MEAS - MV E MAX VEL: 80.2 CM/SEC
BH CV ECHO MEAS - MV MAX PG: 4.5 MMHG
BH CV ECHO MEAS - MV MEAN PG: 1.7 MMHG
BH CV ECHO MEAS - MV V2 VTI: 15.4 CM
BH CV ECHO MEAS - MVA(VTI): 1.85 CM2
BH CV ECHO MEAS - PA ACC TIME: 0.06 SEC
BH CV ECHO MEAS - PA PR(ACCEL): 52.5 MMHG
BH CV ECHO MEAS - PA V2 MAX: 85 CM/SEC
BH CV ECHO MEAS - PI END-D VEL: 185.7 CM/SEC
BH CV ECHO MEAS - RAP SYSTOLE: 8 MMHG
BH CV ECHO MEAS - RV MAX PG: 0.4 MMHG
BH CV ECHO MEAS - RV V1 MAX: 31.7 CM/SEC
BH CV ECHO MEAS - RV V1 VTI: 5.1 CM
BH CV ECHO MEAS - RVDD: 4.7 CM
BH CV ECHO MEAS - RVSP: 49.1 MMHG
BH CV ECHO MEAS - SI(MOD-SP4): 15.2 ML/M2
BH CV ECHO MEAS - SV(LVOT): 28.6 ML
BH CV ECHO MEAS - SV(MOD-SP4): 34.1 ML
BH CV ECHO MEAS - TR MAX PG: 41.1 MMHG
BH CV ECHO MEAS - TR MAX VEL: 319.7 CM/SEC
INR PPP: 2.1 (ref 2–3)
MAXIMAL PREDICTED HEART RATE: 150 BPM
STRESS TARGET HR: 128 BPM

## 2023-04-27 PROCEDURE — 25010000002 SULFUR HEXAFLUORIDE MICROSPH 60.7-25 MG RECONSTITUTED SUSPENSION: Performed by: INTERNAL MEDICINE

## 2023-04-27 PROCEDURE — 93306 TTE W/DOPPLER COMPLETE: CPT

## 2023-04-27 PROCEDURE — 36416 COLLJ CAPILLARY BLOOD SPEC: CPT | Performed by: INTERNAL MEDICINE

## 2023-04-27 PROCEDURE — 85610 PROTHROMBIN TIME: CPT | Performed by: INTERNAL MEDICINE

## 2023-04-27 PROCEDURE — 93306 TTE W/DOPPLER COMPLETE: CPT | Performed by: INTERNAL MEDICINE

## 2023-04-27 RX ADMIN — SULFUR HEXAFLUORIDE 2 ML: KIT at 14:48

## 2023-08-04 DIAGNOSIS — Z79.01 LONG TERM (CURRENT) USE OF ANTICOAGULANTS: ICD-10-CM

## 2023-08-04 DIAGNOSIS — I48.91 ATRIAL FIBRILLATION, UNSPECIFIED TYPE: ICD-10-CM

## 2023-08-04 RX ORDER — WARFARIN SODIUM 3 MG/1
TABLET ORAL
Qty: 30 TABLET | Refills: 0 | Status: SHIPPED | OUTPATIENT
Start: 2023-08-04

## 2023-08-21 ENCOUNTER — ANTICOAGULATION VISIT (OUTPATIENT)
Dept: CARDIOLOGY | Facility: CLINIC | Age: 73
End: 2023-08-21

## 2023-08-21 VITALS — SYSTOLIC BLOOD PRESSURE: 137 MMHG | HEART RATE: 41 BPM | DIASTOLIC BLOOD PRESSURE: 78 MMHG

## 2023-08-21 DIAGNOSIS — I48.91 ATRIAL FIBRILLATION, UNSPECIFIED TYPE: ICD-10-CM

## 2023-08-21 DIAGNOSIS — Z79.01 LONG TERM (CURRENT) USE OF ANTICOAGULANTS: Primary | ICD-10-CM

## 2023-08-21 LAB — INR PPP: 1.8 (ref 2–3)

## 2023-08-21 PROCEDURE — 85610 PROTHROMBIN TIME: CPT | Performed by: INTERNAL MEDICINE

## 2023-08-21 PROCEDURE — 36416 COLLJ CAPILLARY BLOOD SPEC: CPT | Performed by: INTERNAL MEDICINE

## 2023-08-21 RX ORDER — WARFARIN SODIUM 3 MG/1
TABLET ORAL
Qty: 90 TABLET | Refills: 0 | Status: SHIPPED | OUTPATIENT
Start: 2023-08-21

## 2023-09-27 ENCOUNTER — ANTICOAGULATION VISIT (OUTPATIENT)
Dept: CARDIOLOGY | Facility: CLINIC | Age: 73
End: 2023-09-27

## 2023-09-27 VITALS
SYSTOLIC BLOOD PRESSURE: 116 MMHG | WEIGHT: 235 LBS | BODY MASS INDEX: 32.78 KG/M2 | HEART RATE: 76 BPM | DIASTOLIC BLOOD PRESSURE: 84 MMHG

## 2023-09-27 DIAGNOSIS — I48.91 ATRIAL FIBRILLATION, UNSPECIFIED TYPE: ICD-10-CM

## 2023-09-27 DIAGNOSIS — Z79.01 LONG TERM (CURRENT) USE OF ANTICOAGULANTS: Primary | ICD-10-CM

## 2023-09-27 LAB — INR PPP: 2 (ref 0.9–1.1)

## 2023-09-27 PROCEDURE — 36416 COLLJ CAPILLARY BLOOD SPEC: CPT | Performed by: INTERNAL MEDICINE

## 2023-09-27 PROCEDURE — 85610 PROTHROMBIN TIME: CPT | Performed by: INTERNAL MEDICINE

## 2023-10-02 DIAGNOSIS — Z79.01 LONG TERM (CURRENT) USE OF ANTICOAGULANTS: ICD-10-CM

## 2023-10-02 DIAGNOSIS — I48.91 ATRIAL FIBRILLATION, UNSPECIFIED TYPE: ICD-10-CM

## 2023-10-02 RX ORDER — WARFARIN SODIUM 3 MG/1
TABLET ORAL
Qty: 90 TABLET | Refills: 0 | Status: SHIPPED | OUTPATIENT
Start: 2023-10-02

## 2023-11-01 ENCOUNTER — ANTICOAGULATION VISIT (OUTPATIENT)
Dept: CARDIOLOGY | Facility: CLINIC | Age: 73
End: 2023-11-01

## 2023-11-01 VITALS — DIASTOLIC BLOOD PRESSURE: 84 MMHG | SYSTOLIC BLOOD PRESSURE: 132 MMHG | HEART RATE: 89 BPM

## 2023-11-01 DIAGNOSIS — I48.91 ATRIAL FIBRILLATION, UNSPECIFIED TYPE: ICD-10-CM

## 2023-11-01 DIAGNOSIS — Z79.01 LONG TERM (CURRENT) USE OF ANTICOAGULANTS: Primary | ICD-10-CM

## 2023-11-01 LAB — INR PPP: 2.2 (ref 2–3)

## 2023-11-01 PROCEDURE — 85610 PROTHROMBIN TIME: CPT | Performed by: INTERNAL MEDICINE

## 2023-11-01 PROCEDURE — 36416 COLLJ CAPILLARY BLOOD SPEC: CPT | Performed by: INTERNAL MEDICINE

## 2023-12-13 ENCOUNTER — ANTICOAGULATION VISIT (OUTPATIENT)
Dept: CARDIOLOGY | Facility: CLINIC | Age: 73
End: 2023-12-13

## 2023-12-13 VITALS
BODY MASS INDEX: 31.52 KG/M2 | DIASTOLIC BLOOD PRESSURE: 73 MMHG | WEIGHT: 226 LBS | HEART RATE: 61 BPM | SYSTOLIC BLOOD PRESSURE: 113 MMHG

## 2023-12-13 DIAGNOSIS — I48.91 ATRIAL FIBRILLATION, UNSPECIFIED TYPE: ICD-10-CM

## 2023-12-13 DIAGNOSIS — Z79.01 LONG TERM (CURRENT) USE OF ANTICOAGULANTS: Primary | ICD-10-CM

## 2023-12-13 LAB — INR PPP: 2.1 (ref 2–3)

## 2023-12-13 PROCEDURE — 36416 COLLJ CAPILLARY BLOOD SPEC: CPT | Performed by: INTERNAL MEDICINE

## 2023-12-13 PROCEDURE — 85610 PROTHROMBIN TIME: CPT | Performed by: INTERNAL MEDICINE

## 2024-01-23 ENCOUNTER — ANTICOAGULATION VISIT (OUTPATIENT)
Dept: CARDIOLOGY | Facility: CLINIC | Age: 74
End: 2024-01-23

## 2024-01-23 VITALS
DIASTOLIC BLOOD PRESSURE: 74 MMHG | SYSTOLIC BLOOD PRESSURE: 113 MMHG | WEIGHT: 234 LBS | HEART RATE: 74 BPM | BODY MASS INDEX: 32.64 KG/M2

## 2024-01-23 DIAGNOSIS — I48.91 ATRIAL FIBRILLATION, UNSPECIFIED TYPE: ICD-10-CM

## 2024-01-23 DIAGNOSIS — Z79.01 LONG TERM (CURRENT) USE OF ANTICOAGULANTS: Primary | ICD-10-CM

## 2024-01-23 LAB — INR PPP: 2.4 (ref 2–3)

## 2024-01-23 PROCEDURE — 85610 PROTHROMBIN TIME: CPT | Performed by: INTERNAL MEDICINE

## 2024-01-23 PROCEDURE — 36416 COLLJ CAPILLARY BLOOD SPEC: CPT | Performed by: INTERNAL MEDICINE

## 2024-02-23 DIAGNOSIS — I48.91 ATRIAL FIBRILLATION, UNSPECIFIED TYPE: ICD-10-CM

## 2024-02-23 DIAGNOSIS — Z79.01 LONG TERM (CURRENT) USE OF ANTICOAGULANTS: ICD-10-CM

## 2024-02-23 RX ORDER — WARFARIN SODIUM 3 MG/1
TABLET ORAL
Qty: 90 TABLET | Refills: 0 | Status: SHIPPED | OUTPATIENT
Start: 2024-02-23

## 2024-02-28 ENCOUNTER — ANTICOAGULATION VISIT (OUTPATIENT)
Dept: CARDIOLOGY | Facility: CLINIC | Age: 74
End: 2024-02-28

## 2024-02-28 VITALS
BODY MASS INDEX: 33.61 KG/M2 | WEIGHT: 241 LBS | SYSTOLIC BLOOD PRESSURE: 117 MMHG | DIASTOLIC BLOOD PRESSURE: 76 MMHG | HEART RATE: 66 BPM

## 2024-02-28 DIAGNOSIS — I48.91 ATRIAL FIBRILLATION, UNSPECIFIED TYPE: ICD-10-CM

## 2024-02-28 DIAGNOSIS — Z79.01 LONG TERM (CURRENT) USE OF ANTICOAGULANTS: Primary | ICD-10-CM

## 2024-02-28 LAB — INR PPP: 2.1 (ref 2–3)

## 2024-02-28 PROCEDURE — 85610 PROTHROMBIN TIME: CPT | Performed by: INTERNAL MEDICINE

## 2024-02-28 PROCEDURE — 36416 COLLJ CAPILLARY BLOOD SPEC: CPT | Performed by: INTERNAL MEDICINE

## 2024-05-07 ENCOUNTER — ANTICOAGULATION VISIT (OUTPATIENT)
Dept: CARDIOLOGY | Facility: CLINIC | Age: 74
End: 2024-05-07

## 2024-05-07 VITALS
DIASTOLIC BLOOD PRESSURE: 78 MMHG | BODY MASS INDEX: 32.92 KG/M2 | HEART RATE: 73 BPM | SYSTOLIC BLOOD PRESSURE: 132 MMHG | WEIGHT: 236 LBS

## 2024-05-07 DIAGNOSIS — I48.91 ATRIAL FIBRILLATION, UNSPECIFIED TYPE: ICD-10-CM

## 2024-05-07 DIAGNOSIS — Z79.01 LONG TERM (CURRENT) USE OF ANTICOAGULANTS: Primary | ICD-10-CM

## 2024-05-07 LAB — INR PPP: 1.9 (ref 2–3)

## 2024-05-07 PROCEDURE — 85610 PROTHROMBIN TIME: CPT | Performed by: INTERNAL MEDICINE

## 2024-05-07 PROCEDURE — 36416 COLLJ CAPILLARY BLOOD SPEC: CPT | Performed by: INTERNAL MEDICINE

## 2024-06-01 DIAGNOSIS — I48.91 ATRIAL FIBRILLATION, UNSPECIFIED TYPE: ICD-10-CM

## 2024-06-01 DIAGNOSIS — Z79.01 LONG TERM (CURRENT) USE OF ANTICOAGULANTS: ICD-10-CM

## 2024-06-03 RX ORDER — WARFARIN SODIUM 3 MG/1
TABLET ORAL
Qty: 90 TABLET | Refills: 0 | Status: SHIPPED | OUTPATIENT
Start: 2024-06-03

## 2024-06-11 ENCOUNTER — ANTICOAGULATION VISIT (OUTPATIENT)
Dept: CARDIOLOGY | Facility: CLINIC | Age: 74
End: 2024-06-11

## 2024-06-11 VITALS
HEART RATE: 99 BPM | SYSTOLIC BLOOD PRESSURE: 107 MMHG | DIASTOLIC BLOOD PRESSURE: 78 MMHG | WEIGHT: 228 LBS | BODY MASS INDEX: 31.8 KG/M2

## 2024-06-11 DIAGNOSIS — I48.91 ATRIAL FIBRILLATION, UNSPECIFIED TYPE: ICD-10-CM

## 2024-06-11 DIAGNOSIS — Z79.01 LONG TERM (CURRENT) USE OF ANTICOAGULANTS: Primary | ICD-10-CM

## 2024-06-11 LAB — INR PPP: 2.2 (ref 2–3)

## 2024-06-11 PROCEDURE — 85610 PROTHROMBIN TIME: CPT | Performed by: INTERNAL MEDICINE

## 2024-06-11 PROCEDURE — 36416 COLLJ CAPILLARY BLOOD SPEC: CPT | Performed by: INTERNAL MEDICINE

## 2024-06-19 DIAGNOSIS — I42.8 NICM (NONISCHEMIC CARDIOMYOPATHY): ICD-10-CM

## 2024-06-19 DIAGNOSIS — I50.9 ACUTE CONGESTIVE HEART FAILURE, UNSPECIFIED HEART FAILURE TYPE: ICD-10-CM

## 2024-06-20 RX ORDER — CARVEDILOL 3.12 MG/1
TABLET ORAL
Qty: 60 TABLET | Refills: 0 | Status: SHIPPED | OUTPATIENT
Start: 2024-06-20

## 2024-06-20 RX ORDER — SPIRONOLACTONE 25 MG/1
25 TABLET ORAL DAILY
Qty: 30 TABLET | Refills: 0 | Status: SHIPPED | OUTPATIENT
Start: 2024-06-20

## 2024-06-20 RX ORDER — LISINOPRIL 2.5 MG/1
2.5 TABLET ORAL EVERY MORNING
Qty: 30 TABLET | Refills: 0 | Status: SHIPPED | OUTPATIENT
Start: 2024-06-20

## 2024-06-20 RX ORDER — TORSEMIDE 20 MG/1
20 TABLET ORAL DAILY
Qty: 30 TABLET | Refills: 0 | Status: SHIPPED | OUTPATIENT
Start: 2024-06-20

## 2024-07-10 ENCOUNTER — ANTICOAGULATION VISIT (OUTPATIENT)
Dept: CARDIOLOGY | Facility: CLINIC | Age: 74
End: 2024-07-10

## 2024-07-10 VITALS
BODY MASS INDEX: 32.5 KG/M2 | WEIGHT: 233 LBS | SYSTOLIC BLOOD PRESSURE: 144 MMHG | HEART RATE: 71 BPM | DIASTOLIC BLOOD PRESSURE: 81 MMHG

## 2024-07-10 DIAGNOSIS — I48.91 ATRIAL FIBRILLATION, UNSPECIFIED TYPE: ICD-10-CM

## 2024-07-10 DIAGNOSIS — Z79.01 LONG TERM (CURRENT) USE OF ANTICOAGULANTS: Primary | ICD-10-CM

## 2024-07-10 LAB — INR PPP: 2 (ref 2–3)

## 2024-07-10 PROCEDURE — 85610 PROTHROMBIN TIME: CPT | Performed by: INTERNAL MEDICINE

## 2024-07-10 PROCEDURE — 36416 COLLJ CAPILLARY BLOOD SPEC: CPT | Performed by: INTERNAL MEDICINE

## 2024-07-20 DIAGNOSIS — I50.9 ACUTE CONGESTIVE HEART FAILURE, UNSPECIFIED HEART FAILURE TYPE: ICD-10-CM

## 2024-07-20 DIAGNOSIS — I42.8 NICM (NONISCHEMIC CARDIOMYOPATHY): ICD-10-CM

## 2024-07-22 RX ORDER — SPIRONOLACTONE 25 MG/1
25 TABLET ORAL DAILY
Qty: 30 TABLET | Refills: 0 | Status: SHIPPED | OUTPATIENT
Start: 2024-07-22

## 2024-07-22 RX ORDER — TORSEMIDE 20 MG/1
20 TABLET ORAL DAILY
Qty: 30 TABLET | Refills: 0 | Status: SHIPPED | OUTPATIENT
Start: 2024-07-22

## 2024-07-22 RX ORDER — LISINOPRIL 2.5 MG/1
2.5 TABLET ORAL EVERY MORNING
Qty: 30 TABLET | Refills: 0 | Status: SHIPPED | OUTPATIENT
Start: 2024-07-22

## 2024-07-22 RX ORDER — CARVEDILOL 3.12 MG/1
TABLET ORAL
Qty: 60 TABLET | Refills: 0 | Status: SHIPPED | OUTPATIENT
Start: 2024-07-22

## 2024-08-20 DIAGNOSIS — I42.8 NICM (NONISCHEMIC CARDIOMYOPATHY): ICD-10-CM

## 2024-08-20 DIAGNOSIS — I50.9 ACUTE CONGESTIVE HEART FAILURE, UNSPECIFIED HEART FAILURE TYPE: ICD-10-CM

## 2024-08-20 RX ORDER — TORSEMIDE 20 MG/1
20 TABLET ORAL DAILY
Qty: 30 TABLET | Refills: 0 | OUTPATIENT
Start: 2024-08-20

## 2024-08-20 RX ORDER — LISINOPRIL 2.5 MG/1
2.5 TABLET ORAL EVERY MORNING
Qty: 30 TABLET | Refills: 0 | OUTPATIENT
Start: 2024-08-20

## 2024-08-20 RX ORDER — CARVEDILOL 3.12 MG/1
TABLET ORAL
Qty: 60 TABLET | Refills: 0 | OUTPATIENT
Start: 2024-08-20

## 2024-08-20 RX ORDER — SPIRONOLACTONE 25 MG/1
25 TABLET ORAL DAILY
Qty: 30 TABLET | Refills: 0 | OUTPATIENT
Start: 2024-08-20

## 2024-08-21 ENCOUNTER — ANTICOAGULATION VISIT (OUTPATIENT)
Dept: CARDIOLOGY | Facility: CLINIC | Age: 74
End: 2024-08-21

## 2024-08-21 VITALS
BODY MASS INDEX: 31.94 KG/M2 | SYSTOLIC BLOOD PRESSURE: 113 MMHG | WEIGHT: 229 LBS | HEART RATE: 77 BPM | DIASTOLIC BLOOD PRESSURE: 72 MMHG

## 2024-08-21 DIAGNOSIS — Z79.01 LONG TERM (CURRENT) USE OF ANTICOAGULANTS: Primary | ICD-10-CM

## 2024-08-21 DIAGNOSIS — I48.91 ATRIAL FIBRILLATION, UNSPECIFIED TYPE: ICD-10-CM

## 2024-08-21 LAB — INR PPP: 2.2 (ref 2–3)

## 2024-08-21 PROCEDURE — 85610 PROTHROMBIN TIME: CPT | Performed by: INTERNAL MEDICINE

## 2024-08-21 PROCEDURE — 36416 COLLJ CAPILLARY BLOOD SPEC: CPT | Performed by: INTERNAL MEDICINE

## 2024-08-25 DIAGNOSIS — Z79.01 LONG TERM (CURRENT) USE OF ANTICOAGULANTS: ICD-10-CM

## 2024-08-25 DIAGNOSIS — I48.91 ATRIAL FIBRILLATION, UNSPECIFIED TYPE: ICD-10-CM

## 2024-08-26 RX ORDER — WARFARIN SODIUM 3 MG/1
TABLET ORAL
Qty: 90 TABLET | Refills: 0 | Status: SHIPPED | OUTPATIENT
Start: 2024-08-26

## 2024-11-19 DIAGNOSIS — Z79.01 LONG TERM (CURRENT) USE OF ANTICOAGULANTS: ICD-10-CM

## 2024-11-19 DIAGNOSIS — I48.91 ATRIAL FIBRILLATION, UNSPECIFIED TYPE: ICD-10-CM

## 2024-11-19 RX ORDER — WARFARIN SODIUM 3 MG/1
TABLET ORAL
Qty: 14 TABLET | Refills: 0 | Status: SHIPPED | OUTPATIENT
Start: 2024-11-19

## 2024-11-19 NOTE — TELEPHONE ENCOUNTER
Rx Refill Note  Requested Prescriptions     Pending Prescriptions Disp Refills    warfarin (COUMADIN) 3 MG tablet [Pharmacy Med Name: WARFARIN SOD 3MG TABLETS (TAN)] 14 tablet 0     Sig: TAKE 1 TABLET BY MOUTH DAILY      Last office visit with prescribing clinician: 4/27/2023   Last telemedicine visit with prescribing clinician: Visit date not found   Next office visit with prescribing clinician: Visit date not found     Anticoagulation Visit (08/21/2024)     Adelina Finley LPN  11/19/24, 14:16 EST

## 2024-12-21 DIAGNOSIS — Z79.01 LONG TERM (CURRENT) USE OF ANTICOAGULANTS: ICD-10-CM

## 2024-12-21 DIAGNOSIS — I48.91 ATRIAL FIBRILLATION, UNSPECIFIED TYPE: ICD-10-CM

## 2024-12-23 RX ORDER — WARFARIN SODIUM 3 MG/1
TABLET ORAL
Qty: 14 TABLET | Refills: 0 | Status: SHIPPED | OUTPATIENT
Start: 2024-12-23

## 2024-12-23 NOTE — TELEPHONE ENCOUNTER
Rx Refill Note  Requested Prescriptions     Pending Prescriptions Disp Refills    warfarin (COUMADIN) 3 MG tablet [Pharmacy Med Name: WARFARIN SOD 3MG TABLETS (TAN)] 14 tablet 0     Sig: TAKE 1 TABLET BY MOUTH DAILY      Last office visit with prescribing clinician: 4/27/2023   Last telemedicine visit with prescribing clinician: Visit date not found   Next office visit with prescribing clinician: Visit date not found   Anticoagulation Visit 8/21/24 INR 2.2                          Would you like a call back once the refill request has been completed: [] Yes [] No    If the office needs to give you a call back, can they leave a voicemail: [] Yes [] No    Leena Savage RN  12/23/24, 11:46 EST

## 2025-02-03 ENCOUNTER — TELEPHONE (OUTPATIENT)
Dept: CARDIOLOGY | Facility: CLINIC | Age: 75
End: 2025-02-03

## 2025-02-03 DIAGNOSIS — Z79.01 LONG TERM (CURRENT) USE OF ANTICOAGULANTS: ICD-10-CM

## 2025-02-03 DIAGNOSIS — I48.91 ATRIAL FIBRILLATION, UNSPECIFIED TYPE: ICD-10-CM

## 2025-02-03 RX ORDER — WARFARIN SODIUM 3 MG/1
TABLET ORAL
Qty: 14 TABLET | Refills: 0 | OUTPATIENT
Start: 2025-02-03

## 2025-02-03 RX ORDER — WARFARIN SODIUM 3 MG/1
TABLET ORAL
Qty: 14 TABLET | Refills: 0 | Status: SHIPPED | OUTPATIENT
Start: 2025-02-03

## 2025-02-03 NOTE — TELEPHONE ENCOUNTER
Rx Refill Note  Requested Prescriptions     Pending Prescriptions Disp Refills    warfarin (COUMADIN) 3 MG tablet [Pharmacy Med Name: WARFARIN SOD 3MG TABLETS (TAN)] 14 tablet 0     Sig: TAKE 1 TABLET BY MOUTH DAILY      Last office visit with prescribing clinician: 4/27/2023   Last telemedicine visit with prescribing clinician: Visit date not found   Next office visit with prescribing clinician: Visit date not found   Anticoagulation Visit 8/21/24 INR 2.2.                        Would you like a call back once the refill request has been completed: [] Yes [] No    If the office needs to give you a call back, can they leave a voicemail: [] Yes [] No    Leena Savage RN  02/03/25, 12:08 EST

## 2025-02-03 NOTE — TELEPHONE ENCOUNTER
Incoming Refill Request      Medication requested (name and dose): WARFARIN 3 MG    Pharmacy where request should be sent: [facility]    Additional details provided by patient: PATIENT SCHEDULED INR 2/5 @ 11:15 AM.     Best call back number: [phone]    Does the patient have less than a 3 day supply:  [] Yes  [] No    Estefany Dunbar Rep  02/03/25, 09:16 EST

## 2025-02-04 ENCOUNTER — APPOINTMENT (OUTPATIENT)
Dept: CARDIOLOGY | Facility: HOSPITAL | Age: 75
DRG: 292 | End: 2025-02-04
Payer: MEDICARE

## 2025-02-04 ENCOUNTER — APPOINTMENT (OUTPATIENT)
Dept: GENERAL RADIOLOGY | Facility: HOSPITAL | Age: 75
DRG: 292 | End: 2025-02-04
Payer: MEDICAID

## 2025-02-04 ENCOUNTER — HOSPITAL ENCOUNTER (INPATIENT)
Facility: HOSPITAL | Age: 75
LOS: 5 days | Discharge: HOME | DRG: 292 | End: 2025-02-09
Attending: INTERNAL MEDICINE | Admitting: INTERNAL MEDICINE
Payer: MEDICARE

## 2025-02-04 DIAGNOSIS — D64.9 ANEMIA, UNSPECIFIED TYPE: ICD-10-CM

## 2025-02-04 DIAGNOSIS — I50.9 ACUTE ON CHRONIC CONGESTIVE HEART FAILURE, UNSPECIFIED HEART FAILURE TYPE: ICD-10-CM

## 2025-02-04 DIAGNOSIS — R82.71 BACTERIA IN URINE: ICD-10-CM

## 2025-02-04 DIAGNOSIS — G47.9 DIFFICULTY SLEEPING: ICD-10-CM

## 2025-02-04 DIAGNOSIS — N17.9 AKI (ACUTE KIDNEY INJURY): ICD-10-CM

## 2025-02-04 DIAGNOSIS — I50.23 ACUTE ON CHRONIC SYSTOLIC HEART FAILURE: Primary | ICD-10-CM

## 2025-02-04 LAB
ANION GAP SERPL CALCULATED.3IONS-SCNC: 15.4 MMOL/L (ref 5–15)
APTT PPP: 31.6 SECONDS (ref 22.7–35.4)
BACTERIA UR QL AUTO: ABNORMAL /HPF
BASOPHILS # BLD AUTO: 0.03 10*3/MM3 (ref 0–0.2)
BASOPHILS NFR BLD AUTO: 0.4 % (ref 0–1.5)
BILIRUB UR QL STRIP: ABNORMAL
BUN SERPL-MCNC: 23 MG/DL (ref 8–23)
BUN/CREAT SERPL: 15.1 (ref 7–25)
CALCIUM SPEC-SCNC: 9 MG/DL (ref 8.6–10.5)
CHLORIDE SERPL-SCNC: 104 MMOL/L (ref 98–107)
CLARITY UR: ABNORMAL
CO2 SERPL-SCNC: 19.6 MMOL/L (ref 22–29)
COLOR UR: ABNORMAL
CREAT SERPL-MCNC: 1.52 MG/DL (ref 0.76–1.27)
DEPRECATED RDW RBC AUTO: 50.7 FL (ref 37–54)
EGFRCR SERPLBLD CKD-EPI 2021: 48.4 ML/MIN/1.73
EOSINOPHIL # BLD AUTO: 0.01 10*3/MM3 (ref 0–0.4)
EOSINOPHIL NFR BLD AUTO: 0.1 % (ref 0.3–6.2)
ERYTHROCYTE [DISTWIDTH] IN BLOOD BY AUTOMATED COUNT: 14.9 % (ref 12.3–15.4)
FLUAV RNA RESP QL NAA+PROBE: NOT DETECTED
FLUBV RNA RESP QL NAA+PROBE: NOT DETECTED
GEN 5 1HR TROPONIN T REFLEX: 28 NG/L
GLUCOSE SERPL-MCNC: 125 MG/DL (ref 65–99)
GLUCOSE UR STRIP-MCNC: NEGATIVE MG/DL
HCT VFR BLD AUTO: 34.2 % (ref 37.5–51)
HGB BLD-MCNC: 10.8 G/DL (ref 13–17.7)
HGB UR QL STRIP.AUTO: ABNORMAL
HYALINE CASTS UR QL AUTO: ABNORMAL /LPF
IMM GRANULOCYTES # BLD AUTO: 0.04 10*3/MM3 (ref 0–0.05)
IMM GRANULOCYTES NFR BLD AUTO: 0.5 % (ref 0–0.5)
INR PPP: 1.5 (ref 2–3)
KETONES UR QL STRIP: ABNORMAL
LEUKOCYTE ESTERASE UR QL STRIP.AUTO: ABNORMAL
LYMPHOCYTES # BLD AUTO: 0.79 10*3/MM3 (ref 0.7–3.1)
LYMPHOCYTES NFR BLD AUTO: 10.3 % (ref 19.6–45.3)
MCH RBC QN AUTO: 29.4 PG (ref 26.6–33)
MCHC RBC AUTO-ENTMCNC: 31.6 G/DL (ref 31.5–35.7)
MCV RBC AUTO: 93.2 FL (ref 79–97)
MONOCYTES # BLD AUTO: 0.88 10*3/MM3 (ref 0.1–0.9)
MONOCYTES NFR BLD AUTO: 11.5 % (ref 5–12)
NEUTROPHILS NFR BLD AUTO: 5.93 10*3/MM3 (ref 1.7–7)
NEUTROPHILS NFR BLD AUTO: 77.2 % (ref 42.7–76)
NITRITE UR QL STRIP: NEGATIVE
NRBC BLD AUTO-RTO: 0 /100 WBC (ref 0–0.2)
NT-PROBNP SERPL-MCNC: ABNORMAL PG/ML (ref 0–900)
PH UR STRIP.AUTO: <=5 [PH] (ref 5–8)
PLATELET # BLD AUTO: 209 10*3/MM3 (ref 140–450)
PMV BLD AUTO: 10.9 FL (ref 6–12)
POTASSIUM SERPL-SCNC: 4 MMOL/L (ref 3.5–5.2)
PROT UR QL STRIP: ABNORMAL
PROTHROMBIN TIME: 18.1 SECONDS (ref 19.4–28.5)
RBC # BLD AUTO: 3.67 10*6/MM3 (ref 4.14–5.8)
RBC # UR STRIP: ABNORMAL /HPF
REF LAB TEST METHOD: ABNORMAL
RSV RNA RESP QL NAA+PROBE: NOT DETECTED
SARS-COV-2 RNA RESP QL NAA+PROBE: NOT DETECTED
SODIUM SERPL-SCNC: 139 MMOL/L (ref 136–145)
SP GR UR STRIP: 1.03 (ref 1–1.03)
SQUAMOUS #/AREA URNS HPF: ABNORMAL /HPF
TROPONIN T % DELTA: 17
TROPONIN T NUMERIC DELTA: 4 NG/L
TROPONIN T SERPL HS-MCNC: 24 NG/L
UROBILINOGEN UR QL STRIP: ABNORMAL
WBC # UR STRIP: ABNORMAL /HPF
WBC NRBC COR # BLD AUTO: 7.68 10*3/MM3 (ref 3.4–10.8)

## 2025-02-04 PROCEDURE — 99285 EMERGENCY DEPT VISIT HI MDM: CPT

## 2025-02-04 PROCEDURE — 36415 COLL VENOUS BLD VENIPUNCTURE: CPT

## 2025-02-04 PROCEDURE — 81001 URINALYSIS AUTO W/SCOPE: CPT | Performed by: OCCUPATIONAL THERAPIST

## 2025-02-04 PROCEDURE — 80048 BASIC METABOLIC PNL TOTAL CA: CPT | Performed by: OCCUPATIONAL THERAPIST

## 2025-02-04 PROCEDURE — 85025 COMPLETE CBC W/AUTO DIFF WBC: CPT | Performed by: OCCUPATIONAL THERAPIST

## 2025-02-04 PROCEDURE — 87637 SARSCOV2&INF A&B&RSV AMP PRB: CPT | Performed by: OCCUPATIONAL THERAPIST

## 2025-02-04 PROCEDURE — 87086 URINE CULTURE/COLONY COUNT: CPT | Performed by: OCCUPATIONAL THERAPIST

## 2025-02-04 PROCEDURE — 93306 TTE W/DOPPLER COMPLETE: CPT

## 2025-02-04 PROCEDURE — 25010000002 FUROSEMIDE PER 20 MG: Performed by: OCCUPATIONAL THERAPIST

## 2025-02-04 PROCEDURE — 85610 PROTHROMBIN TIME: CPT | Performed by: OCCUPATIONAL THERAPIST

## 2025-02-04 PROCEDURE — 25010000002 ENOXAPARIN PER 10 MG: Performed by: INTERNAL MEDICINE

## 2025-02-04 PROCEDURE — 85730 THROMBOPLASTIN TIME PARTIAL: CPT | Performed by: OCCUPATIONAL THERAPIST

## 2025-02-04 PROCEDURE — 93356 MYOCRD STRAIN IMG SPCKL TRCK: CPT

## 2025-02-04 PROCEDURE — 93356 MYOCRD STRAIN IMG SPCKL TRCK: CPT | Performed by: INTERNAL MEDICINE

## 2025-02-04 PROCEDURE — 84484 ASSAY OF TROPONIN QUANT: CPT | Performed by: INTERNAL MEDICINE

## 2025-02-04 PROCEDURE — 71046 X-RAY EXAM CHEST 2 VIEWS: CPT

## 2025-02-04 PROCEDURE — 83880 ASSAY OF NATRIURETIC PEPTIDE: CPT | Performed by: OCCUPATIONAL THERAPIST

## 2025-02-04 PROCEDURE — 93306 TTE W/DOPPLER COMPLETE: CPT | Performed by: INTERNAL MEDICINE

## 2025-02-04 RX ORDER — WARFARIN SODIUM 6 MG/1
6 TABLET ORAL
Status: COMPLETED | OUTPATIENT
Start: 2025-02-04 | End: 2025-02-04

## 2025-02-04 RX ORDER — FUROSEMIDE 10 MG/ML
80 INJECTION INTRAMUSCULAR; INTRAVENOUS ONCE
Status: COMPLETED | OUTPATIENT
Start: 2025-02-04 | End: 2025-02-04

## 2025-02-04 RX ORDER — ACETAMINOPHEN 160 MG/5ML
650 SOLUTION ORAL EVERY 4 HOURS PRN
Status: DISCONTINUED | OUTPATIENT
Start: 2025-02-04 | End: 2025-02-09 | Stop reason: HOSPADM

## 2025-02-04 RX ORDER — SODIUM CHLORIDE 9 MG/ML
40 INJECTION, SOLUTION INTRAVENOUS AS NEEDED
Status: DISCONTINUED | OUTPATIENT
Start: 2025-02-04 | End: 2025-02-09 | Stop reason: HOSPADM

## 2025-02-04 RX ORDER — ACETAMINOPHEN 650 MG/1
650 SUPPOSITORY RECTAL EVERY 4 HOURS PRN
Status: DISCONTINUED | OUTPATIENT
Start: 2025-02-04 | End: 2025-02-09 | Stop reason: HOSPADM

## 2025-02-04 RX ORDER — BUMETANIDE 0.25 MG/ML
1 INJECTION, SOLUTION INTRAMUSCULAR; INTRAVENOUS EVERY 12 HOURS
Status: DISCONTINUED | OUTPATIENT
Start: 2025-02-05 | End: 2025-02-07

## 2025-02-04 RX ORDER — ENOXAPARIN SODIUM 150 MG/ML
1 INJECTION SUBCUTANEOUS EVERY 12 HOURS
Status: DISCONTINUED | OUTPATIENT
Start: 2025-02-04 | End: 2025-02-05

## 2025-02-04 RX ORDER — SODIUM CHLORIDE 0.9 % (FLUSH) 0.9 %
10 SYRINGE (ML) INJECTION AS NEEDED
Status: DISCONTINUED | OUTPATIENT
Start: 2025-02-04 | End: 2025-02-09 | Stop reason: HOSPADM

## 2025-02-04 RX ORDER — NITROGLYCERIN 0.4 MG/1
0.4 TABLET SUBLINGUAL
Status: DISCONTINUED | OUTPATIENT
Start: 2025-02-04 | End: 2025-02-09 | Stop reason: HOSPADM

## 2025-02-04 RX ORDER — ALUMINA, MAGNESIA, AND SIMETHICONE 2400; 2400; 240 MG/30ML; MG/30ML; MG/30ML
15 SUSPENSION ORAL EVERY 6 HOURS PRN
Status: DISCONTINUED | OUTPATIENT
Start: 2025-02-04 | End: 2025-02-09 | Stop reason: HOSPADM

## 2025-02-04 RX ORDER — ONDANSETRON 4 MG/1
4 TABLET, ORALLY DISINTEGRATING ORAL EVERY 6 HOURS PRN
Status: DISCONTINUED | OUTPATIENT
Start: 2025-02-04 | End: 2025-02-09 | Stop reason: HOSPADM

## 2025-02-04 RX ORDER — POLYETHYLENE GLYCOL 3350 17 G/17G
17 POWDER, FOR SOLUTION ORAL DAILY PRN
Status: DISCONTINUED | OUTPATIENT
Start: 2025-02-04 | End: 2025-02-09 | Stop reason: HOSPADM

## 2025-02-04 RX ORDER — WARFARIN SODIUM 3 MG/1
3 TABLET ORAL
Status: DISCONTINUED | OUTPATIENT
Start: 2025-02-05 | End: 2025-02-06

## 2025-02-04 RX ORDER — ACETAMINOPHEN 325 MG/1
650 TABLET ORAL EVERY 4 HOURS PRN
Status: DISCONTINUED | OUTPATIENT
Start: 2025-02-04 | End: 2025-02-09 | Stop reason: HOSPADM

## 2025-02-04 RX ORDER — AMOXICILLIN 250 MG
2 CAPSULE ORAL 2 TIMES DAILY PRN
Status: DISCONTINUED | OUTPATIENT
Start: 2025-02-04 | End: 2025-02-09 | Stop reason: HOSPADM

## 2025-02-04 RX ORDER — SODIUM CHLORIDE 0.9 % (FLUSH) 0.9 %
10 SYRINGE (ML) INJECTION EVERY 12 HOURS SCHEDULED
Status: DISCONTINUED | OUTPATIENT
Start: 2025-02-04 | End: 2025-02-09 | Stop reason: HOSPADM

## 2025-02-04 RX ORDER — ONDANSETRON 2 MG/ML
4 INJECTION INTRAMUSCULAR; INTRAVENOUS EVERY 6 HOURS PRN
Status: DISCONTINUED | OUTPATIENT
Start: 2025-02-04 | End: 2025-02-09 | Stop reason: HOSPADM

## 2025-02-04 RX ORDER — BISACODYL 5 MG/1
5 TABLET, DELAYED RELEASE ORAL DAILY PRN
Status: DISCONTINUED | OUTPATIENT
Start: 2025-02-04 | End: 2025-02-09 | Stop reason: HOSPADM

## 2025-02-04 RX ORDER — BISACODYL 10 MG
10 SUPPOSITORY, RECTAL RECTAL DAILY PRN
Status: DISCONTINUED | OUTPATIENT
Start: 2025-02-04 | End: 2025-02-09 | Stop reason: HOSPADM

## 2025-02-04 RX ADMIN — WARFARIN SODIUM 6 MG: 6 TABLET ORAL at 19:12

## 2025-02-04 RX ADMIN — ENOXAPARIN SODIUM 105 MG: 150 INJECTION SUBCUTANEOUS at 21:08

## 2025-02-04 RX ADMIN — FUROSEMIDE 80 MG: 10 INJECTION, SOLUTION INTRAMUSCULAR; INTRAVENOUS at 16:32

## 2025-02-04 RX ADMIN — Medication 5 MG: at 22:23

## 2025-02-04 RX ADMIN — Medication 10 ML: at 21:08

## 2025-02-04 NOTE — H&P
History and Physical   Guanakito Styles : 1950 MRN:[id number] LOS:0     Reason for admission: HF (heart failure)     Assessment / Plan     #Dyspnoea 2/2 heart failure reduced ejection fraction exacerbation  -Patient presented with dyspnea and lower extremity edema.    -BNP was 16K.    -UA with pyuria and bacteriuria but poor sample.  -CXR with Cardiomegaly and mild pulmonary vascular congestion.   -Echocardiogram in 2023 with ejection fraction of 26 to 30%  -Patient has been out of diuretic at home.  -was given lasix 80 mg once and will give bumex 1 mg IV bid now   -echo to repeat   -Follow-up with Dr. Saenz as OP and will consult     #A fib   -continue warfarin pharmacy to dose and lovenox bridging given INR 1.5   -has run out of warfarin for a while     #CKD  -Cr is 1.3 1 year back and now at 1.5  -need to follow up with nephro on dc   -BMP daily   -Avoid nephrotoxic medication    Code Status (Patient has no pulse and is not breathing): CPR (Attempt to Resuscitate)  Medical Interventions (Patient has pulse or is breathing): Full Support       Nutrition: Diet: Cardiac, Diabetic; Healthy Heart (2-3 Na+); Consistent Carbohydrate; Fluid Consistency: Thin (IDDSI 0)     DVT Prophylaxis: Active VTE Prophylaxis  Pharmacologic:        Start     Dose Route Frequency Stop    25 170  Pharmacy to dose warfarin        Question:  Target INR  Answer:  2 - 3    -- XX Continuous PRN --                  Mechanical:        Start        25 1703  Maintain Sequential Compression Device  Continuous                              History of Present illness     A 74 y.o. old male patient with PMH of heart failure reduced ejection fraction atrial fibrillation presents to the hospital with complaints of dyspnea and bilateral lower extremity edema.  Running out of her diuretic and warfarin at home for quite some times.  BNP of 16K.  Chest x-ray with pulmonary vascular congestion.  Lasix 80 mg IV one-time given in the  ED started on Bumex 1 mg IV twice daily.  Patient has creatinine of 1.51-year ago it is 1.3.  Probably new baseline creatinine will monitor.  Will need to follow-up with nephrologist and cardiologist upon discharge.    Subjective / Review of systems     Review of Systems   SOB     Past Medical/Surgical/Social/Family History & Allergies   No past medical history on file.   Past Surgical History:   Procedure Laterality Date    CARDIAC CATHETERIZATION N/A 3/8/2023    Procedure: Left Heart Cath and coronary angiogram;  Surgeon: Axel Saenz MD;  Location: [facility] CATH INVASIVE LOCATION;  Service: Cardiovascular;  Laterality: N/A;    CARDIAC CATHETERIZATION  3/8/2023    Procedure: Right and Left Heart Cath;  Surgeon: Axel Saenz MD;  Location: [facility] CATH INVASIVE LOCATION;  Service: Cardiovascular;;      Social History     Socioeconomic History    Marital status:    Tobacco Use    Smoking status: Former     Types: Cigarettes     Passive exposure: Past    Smokeless tobacco: Never   Vaping Use    Vaping status: Never Used   Substance and Sexual Activity    Alcohol use: Yes    Drug use: Never    Sexual activity: Defer      No family history on file.   Allergies   Allergen Reactions    Albuterol Other (See Comments)     Pt reports that breathing is worse with use    Penicillins Unknown - Low Severity        Home Medications     Prior to Admission medications    Medication Sig Start Date End Date Taking? Authorizing Provider   warfarin (COUMADIN) 3 MG tablet TAKE 1 TABLET BY MOUTH DAILY 2/3/25  Yes Axel Saenz MD   carvedilol (COREG) 3.125 MG tablet TAKE 1 TABLET BY MOUTH TWICE DAILY WITH MEALS 7/22/24 2/4/25  Axel Saenz MD   lisinopril (PRINIVIL,ZESTRIL) 2.5 MG tablet TAKE 1 TABLET BY MOUTH EVERY MORNING 7/22/24 2/4/25  Axel Saenz MD   spironolactone (ALDACTONE) 25 MG tablet TAKE 1 TABLET BY MOUTH DAILY 7/22/24 2/4/25  Axel Saenz MD   torsemide (DEMADEX) 20 MG tablet TAKE 1  TABLET BY MOUTH DAILY 7/22/24 2/4/25  Axel Saenz MD   traZODone (DESYREL) 50 MG tablet TAKE 1 TABLET BY MOUTH AT BEDTIME FOR INSOMNIA 2/16/23 2/4/25  Provider, MD Shekhar      Objective / Physical Exam   Vital signs:  Temp: 98.1 °F (36.7 °C)  BP: 139/61  Heart Rate: 110  Resp: 18  SpO2: 92 %  Weight: 111 kg (245 lb 6 oz)    Admission Weight: Weight: 111 kg (245 lb 6 oz)    Physical Exam   Physical Exam  HENT:      Head: Normocephalic and atraumatic.      Nose: Nose normal.   Eyes:      Extraocular Movements: Extraocular movements intact.      Conjunctivae/sclerae: Conjunctivae normal.      Pupils: Pupils are equal, round, and reactive to light.   Cardiovascular:      Rate and Rhythm: Normal       Pulses: Normal pulses.      Heart sounds: Normal heart sounds.   Pulmonary:   Reduced BS   Abdominal:      General: Abdomen is flat. Bowel sounds are normal.      Palpations: Abdomen is soft.   Musculoskeletal:         Bilateral lower extremity edema  Skin:     General: Skin is dry.   Neurological:      General: No focal deficit present.      Mental Status: alert.   Psychiatric:         Mood and Affect: Mood normal.        Labs     Results from last 7 days   Lab Units 02/04/25  1509   WBC 10*3/mm3 7.68   HEMATOCRIT % 34.2*   PLATELETS 10*3/mm3 209      Results from last 7 days   Lab Units 02/04/25  1509   SODIUM mmol/L 139   POTASSIUM mmol/L 4.0   CHLORIDE mmol/L 104   CO2 mmol/L 19.6*   BUN mg/dL 23   CREATININE mg/dL 1.52*        Current Medications   Scheduled Meds:[START ON 2/5/2025] bumetanide, 1 mg, Intravenous, Q12H  sodium chloride, 10 mL, Intravenous, Q12H         Continuous Infusions:Pharmacy to dose warfarin,          Keanu Sandoval MD  [facility]   02/04/25   17:09 EST

## 2025-02-04 NOTE — PLAN OF CARE
Goal Outcome Evaluation:                    Patient A&O waiting for inpatient room. Call light and bedside table within reach.

## 2025-02-04 NOTE — ED NOTES
"Patient reports increased swelling from his waist down.  Reports that he was on a diuretic but has been out of it for several weeks, has not made a follow up with cardiologist.  Family members x 2 at bedside report that the \"little ones\" have flu A and confirm that he has been around them- they all have.  He refuses respiratory swab at this time.  Reports that if he can get back on his meds then he will be ready to go home.  He also reports that he does not need to urinate at this time but is advised of urine sample needs and provided with urine cup and instructions.    "

## 2025-02-04 NOTE — PROGRESS NOTES
"Pharmacy dosing service  Anticoagulant  Warfarin     Subjective:    Guanakito Styles is a 74 y.o.male being continued on warfarin for Atrial Fibrillation / Flutter.    INR Goal: 2 - 3  Home medication?: warfarin 3 mg PO daily  Bridge Therapy Present?:  No  Interacting Medications Evaluation (New/Present/Discontinued): n/a  Additional Contributing Factors: aeCHF      Assessment/Plan:    INR is subtherapeutic today, will bolus with 6 mg PO x 1 and resume home regimen tomorrow.     Continue to monitor and adjust based on INR. Daily INR is ordered.          Date 2/4           INR 1.5           Dose 6mg               Objective:  [Ht: 180.3 cm (71\"); Wt: 111 kg (245 lb 6 oz); BMI: Body mass index is 34.22 kg/m².]    Lab Results   Component Value Date    ALBUMIN 3.2 (L) 03/09/2023     Lab Results   Component Value Date    INR 1.50 (L) 02/04/2025    INR 2.20 08/21/2024    INR 2.00 07/10/2024    PROTIME 18.1 (L) 02/04/2025    PROTIME 13.9 (H) 03/09/2023    PROTIME 13.5 (H) 03/09/2023     Lab Results   Component Value Date    HGB 10.8 (L) 02/04/2025    HGB 12.1 (L) 03/09/2023    HGB 11.7 (L) 03/09/2023     Lab Results   Component Value Date    HCT 34.2 (L) 02/04/2025    HCT 37.2 (L) 03/09/2023    HCT 36.9 (L) 03/09/2023       Abi Willard, PharmD  02/04/25 17:57 EST     "

## 2025-02-04 NOTE — ED PROVIDER NOTES
Subjective   History of Present Illness  Patient is a 74-year-old male with PMH significant for cardiac cath and CHF who presented to the ER for evaluation of shortness of breath for 2 weeks.  He reports he has had bilateral foot swelling as well.  He reports that the biggest complaint that he notes is that he cannot sleep.  He states that this has been going on for several weeks.  He reports that it is not entirely that he cannot lay flat but that is part of it.  Patient has had some cough and nasal congestion.  He has also had some intermittent nausea, vomiting, and diarrhea.  Family member at bedside reports that he has not been taking his medicine for his CHF like he is supposed to.        Review of Systems   Constitutional:  Negative for fever.       No past medical history on file.    Allergies   Allergen Reactions    Albuterol Other (See Comments)     Pt reports that breathing is worse with use    Penicillins Unknown - Low Severity       Past Surgical History:   Procedure Laterality Date    CARDIAC CATHETERIZATION N/A 3/8/2023    Procedure: Left Heart Cath and coronary angiogram;  Surgeon: Axel Saenz MD;  Location: [facility] CATH INVASIVE LOCATION;  Service: Cardiovascular;  Laterality: N/A;    CARDIAC CATHETERIZATION  3/8/2023    Procedure: Right and Left Heart Cath;  Surgeon: Axel Saenz MD;  Location: [facility] CATH INVASIVE LOCATION;  Service: Cardiovascular;;       No family history on file.    Social History     Socioeconomic History    Marital status:    Tobacco Use    Smoking status: Former     Types: Cigarettes     Passive exposure: Past    Smokeless tobacco: Never   Vaping Use    Vaping status: Never Used   Substance and Sexual Activity    Alcohol use: Yes    Drug use: Never    Sexual activity: Defer           Objective   Physical Exam  Vitals and nursing note reviewed.   Constitutional:       General: He is not in acute distress.     Appearance: He is well-developed and normal  weight. He is not ill-appearing, toxic-appearing or diaphoretic.   HENT:      Head: Normocephalic and atraumatic.      Mouth/Throat:      Mouth: Mucous membranes are moist.      Pharynx: No pharyngeal swelling.   Eyes:      Extraocular Movements: Extraocular movements intact.      Pupils: Pupils are equal, round, and reactive to light.   Neck:      Vascular: No JVD.      Trachea: No tracheal deviation.   Cardiovascular:      Rate and Rhythm: Normal rate and regular rhythm.      Heart sounds: No murmur heard.     No friction rub. No gallop.   Pulmonary:      Effort: Pulmonary effort is normal. No tachypnea, bradypnea, accessory muscle usage or respiratory distress.      Breath sounds: Normal breath sounds. No stridor. No decreased breath sounds, wheezing, rhonchi or rales.   Abdominal:      General: Bowel sounds are normal.   Musculoskeletal:      Cervical back: Normal range of motion and neck supple.      Right lower leg: Edema present.      Left lower leg: Edema present.   Skin:     General: Skin is warm and dry.      Capillary Refill: Capillary refill takes less than 2 seconds.   Neurological:      General: No focal deficit present.      Mental Status: He is alert.   Psychiatric:         Mood and Affect: Mood normal.         Behavior: Behavior normal.         Procedures           ED Course      Labs Reviewed   BASIC METABOLIC PANEL - Abnormal; Notable for the following components:       Result Value    Glucose 125 (*)     Creatinine 1.52 (*)     CO2 19.6 (*)     Anion Gap 15.4 (*)     eGFR 48.4 (*)     All other components within normal limits    Narrative:     GFR Categories in Chronic Kidney Disease (CKD)      GFR Category          GFR (mL/min/1.73)    Interpretation  G1                     90 or greater         Normal or high (1)  G2                      60-89                Mild decrease (1)  G3a                   45-59                Mild to moderate decrease  G3b                   30-44                 Moderate to severe decrease  G4                    15-29                Severe decrease  G5                    14 or less           Kidney failure          (1)In the absence of evidence of kidney disease, neither GFR category G1 or G2 fulfill the criteria for CKD.    eGFR calculation 2021 CKD-EPI creatinine equation, which does not include race as a factor   URINALYSIS W/ CULTURE IF INDICATED - Abnormal; Notable for the following components:    Color, UA Dark Yellow (*)     Appearance, UA Slightly Cloudy (*)     Ketones, UA Trace (*)     Bilirubin, UA Small (1+) (*)     Blood, UA Trace (*)     Protein, UA >=300 mg/dL (3+) (*)     Leuk Esterase, UA Trace (*)     All other components within normal limits    Narrative:     In absence of clinical symptoms, the presence of pyuria, bacteria, and/or nitrites on the urinalysis result does not correlate with infection.   BNP (IN-HOUSE) - Abnormal; Notable for the following components:    proBNP 16,707.0 (*)     All other components within normal limits    Narrative:     This assay is used as an aid in the diagnosis of individuals suspected of having heart failure. It can be used as an aid in the diagnosis of acute decompensated heart failure (ADHF) in patients presenting with signs and symptoms of ADHF to the emergency department (ED). In addition, NT-proBNP of <300 pg/mL indicates ADHF is not likely.    Age Range Result Interpretation  NT-proBNP Concentration (pg/mL:      <50             Positive            >450                   Gray                 300-450                    Negative             <300    50-75           Positive            >900                  Gray                300-900                  Negative            <300      >75             Positive            >1800                  Gray                300-1800                  Negative            <300   PROTIME-INR - Abnormal; Notable for the following components:    Protime 18.1 (*)     INR 1.50 (*)     All  other components within normal limits   CBC WITH AUTO DIFFERENTIAL - Abnormal; Notable for the following components:    RBC 3.67 (*)     Hemoglobin 10.8 (*)     Hematocrit 34.2 (*)     Neutrophil % 77.2 (*)     Lymphocyte % 10.3 (*)     Eosinophil % 0.1 (*)     All other components within normal limits   URINALYSIS, MICROSCOPIC ONLY - Abnormal; Notable for the following components:    WBC, UA 6-10 (*)     Bacteria, UA 2+ (*)     Squamous Epithelial Cells, UA 3-6 (*)     All other components within normal limits   COVID-19/FLUA&B/RSV, NP SWAB IN TRANSPORT MEDIA 1 HR TAT - Normal    Narrative:     Fact sheet for providers: https://www.fda.gov/media/518634/download    Fact sheet for patients: https://www.fda.gov/media/907232/download    Test performed by PCR.   APTT - Normal   URINE CULTURE   CBC AND DIFFERENTIAL    Narrative:     The following orders were created for panel order CBC & Differential.  Procedure                               Abnormality         Status                     ---------                               -----------         ------                     CBC Auto Differential[[id number]]        Abnormal            Final result                 Please view results for these tests on the individual orders.     XR Chest 2 View    Result Date: 2/4/2025  Impression: Cardiomegaly and mild pulmonary vascular congestion. Electronically Signed: Andrew Lennon MD  2/4/2025 3:17 PM EST  Workstation ID: [id number]   Medications   furosemide (LASIX) injection 80 mg (has no administration in time range)                                                        Medical Decision Making  Patient is a 74-year-old male who presented with the above complaint.  He had the above evaluation and exam.     Imaging independently interpreted by radiology and reviewed by myself.  X-ray of the chest showed cardiomegaly with mild pulmonary vascular congestion.    Labs were significant for 2+ bacteria on UA with 6-10 white cells.  Urine  culture pending.  PTT 31.6.  Pro time 18.1, INR 1.5.  BNP 16,717.  BNP 1.54 creatinine, 48.4 GFR, 15.4 anion gap.  CBC significant for 10.8 hemoglobin.  Respiratory panel was negative.    Patient received single dose of Lasix in the ED. At reassessment, patient resting comfortably in no acute distress.  He is afebrile and hemodynamically stable.  Vital signs are normal.  He is not experiencing any urinary symptoms at this time.  No hypoxia or signs of respiratory distress.  Discussed patient with Dr. Adams, who will admit patient for further management.    Chart review: Stress test 3/7/2023-EF of 10%; TTE 3/16/2023-EF of 25%    Problems Addressed:  Anemia, unspecified type: complicated acute illness or injury  Bacteria in urine: complicated acute illness or injury    Amount and/or Complexity of Data Reviewed  Labs: ordered.  Radiology: ordered.    Risk  Prescription drug management.  Decision regarding hospitalization.        Final diagnoses:   Anemia, unspecified type   CHRISTINA (acute kidney injury)   Acute on chronic congestive heart failure, unspecified heart failure type   Bacteria in urine   Difficulty sleeping       ED Disposition  ED Disposition       ED Disposition   Decision to Admit    Condition   --    Comment   Level of Care: Telemetry [5]   Admitting Physician: NELLIE POLANCO [[id number]]   Attending Physician: NELLIE POLANCO [[id number]]                 No follow-up provider specified.       Medication List      No changes were made to your prescriptions during this visit.            Nichol Isidro PA-C  02/04/25 1965

## 2025-02-04 NOTE — Clinical Note
Level of Care: Telemetry [5]   Admitting Physician: NELLIE POLANCO [[id number]]   Attending Physician: NELLIE POLANCO [[id number]]

## 2025-02-04 NOTE — ED NOTES
"Nursing report ED to floor  Guanakito Styles  74 y.o.  male    HPI:   Chief Complaint   Patient presents with    Shortness of Breath     Pt reports increased SOA and bilateral feet swelling for past 2 weeks       Admitting doctor:   Keanu Sandoval MD    Admitting diagnosis:   The primary encounter diagnosis was Acute on chronic congestive heart failure, unspecified heart failure type. Diagnoses of Anemia, unspecified type, CHRSITINA (acute kidney injury), Bacteria in urine, and Difficulty sleeping were also pertinent to this visit.    Code status:   Current Code Status       Date Active Code Status Order ID Comments User Context       Prior            Allergies:   Albuterol and Penicillins    Isolation:  Enhanced Droplet/Contact      Fall Risk:  Fall Risk Assessment was completed, and patient is at low risk for falls.   Predictive Model Details         6 (Low) Factor Value    Calculated 2/4/2025 16:29 Age 74    Risk of Fall Model Active Peripheral IV Present     Imaging order in this encounter Present     Magnesium not on file     Alfredo Scale not on file     Creatinine 1.52 mg/dL     Diastolic BP 78     Calcium 9 mg/dL     Clinically Relevant Sex Not Female     Cardiac Assessment X     Albumin not on file     Respiratory Rate 16     Total Bilirubin not on file     Tobacco Use Quit     Days after Admission 0.087     Potassium 4 mmol/L     Chloride 104 mmol/L     ALT not on file         Weight:       02/04/25  1422   Weight: 111 kg (245 lb 6 oz)       Intake and Output  No intake or output data in the 24 hours ending 02/04/25 1636    Diet:        Most recent vitals:   Vitals:    02/04/25 1422 02/04/25 1514 02/04/25 1613   BP: 122/79 124/91 115/78   BP Location: Left arm Left arm Left arm   Patient Position: Sitting Lying Sitting   Pulse: 72 97 90   Resp: 19 18 16   Temp: 98.1 °F (36.7 °C)     TempSrc: Oral     SpO2: 97% 94% 93%   Weight: 111 kg (245 lb 6 oz)     Height: 180.3 cm (71\")         Active LDAs/IV Access:   Lines, " Drains & Airways       Active LDAs       Name Placement date Placement time Site Days    Peripheral IV 02/04/25 1509 Left;Posterior Hand 02/04/25  1509  Hand  less than 1                    Skin Condition:   Skin Assessments (last day)       None             Labs (abnormal labs have a star):   Labs Reviewed   BASIC METABOLIC PANEL - Abnormal; Notable for the following components:       Result Value    Glucose 125 (*)     Creatinine 1.52 (*)     CO2 19.6 (*)     Anion Gap 15.4 (*)     eGFR 48.4 (*)     All other components within normal limits    Narrative:     GFR Categories in Chronic Kidney Disease (CKD)      GFR Category          GFR (mL/min/1.73)    Interpretation  G1                     90 or greater         Normal or high (1)  G2                      60-89                Mild decrease (1)  G3a                   45-59                Mild to moderate decrease  G3b                   30-44                Moderate to severe decrease  G4                    15-29                Severe decrease  G5                    14 or less           Kidney failure          (1)In the absence of evidence of kidney disease, neither GFR category G1 or G2 fulfill the criteria for CKD.    eGFR calculation 2021 CKD-EPI creatinine equation, which does not include race as a factor   URINALYSIS W/ CULTURE IF INDICATED - Abnormal; Notable for the following components:    Color, UA Dark Yellow (*)     Appearance, UA Slightly Cloudy (*)     Ketones, UA Trace (*)     Bilirubin, UA Small (1+) (*)     Blood, UA Trace (*)     Protein, UA >=300 mg/dL (3+) (*)     Leuk Esterase, UA Trace (*)     All other components within normal limits    Narrative:     In absence of clinical symptoms, the presence of pyuria, bacteria, and/or nitrites on the urinalysis result does not correlate with infection.   BNP (IN-HOUSE) - Abnormal; Notable for the following components:    proBNP 16,707.0 (*)     All other components within normal limits    Narrative:      This assay is used as an aid in the diagnosis of individuals suspected of having heart failure. It can be used as an aid in the diagnosis of acute decompensated heart failure (ADHF) in patients presenting with signs and symptoms of ADHF to the emergency department (ED). In addition, NT-proBNP of <300 pg/mL indicates ADHF is not likely.    Age Range Result Interpretation  NT-proBNP Concentration (pg/mL:      <50             Positive            >450                   Gray                 300-450                    Negative             <300    50-75           Positive            >900                  Gray                300-900                  Negative            <300      >75             Positive            >1800                  Gray                300-1800                  Negative            <300   PROTIME-INR - Abnormal; Notable for the following components:    Protime 18.1 (*)     INR 1.50 (*)     All other components within normal limits   CBC WITH AUTO DIFFERENTIAL - Abnormal; Notable for the following components:    RBC 3.67 (*)     Hemoglobin 10.8 (*)     Hematocrit 34.2 (*)     Neutrophil % 77.2 (*)     Lymphocyte % 10.3 (*)     Eosinophil % 0.1 (*)     All other components within normal limits   URINALYSIS, MICROSCOPIC ONLY - Abnormal; Notable for the following components:    WBC, UA 6-10 (*)     Bacteria, UA 2+ (*)     Squamous Epithelial Cells, UA 3-6 (*)     All other components within normal limits   COVID-19/FLUA&B/RSV, NP SWAB IN TRANSPORT MEDIA 1 HR TAT - Normal    Narrative:     Fact sheet for providers: https://www.fda.gov/media/023084/download    Fact sheet for patients: https://www.fda.gov/media/310227/download    Test performed by PCR.   APTT - Normal   URINE CULTURE   CBC AND DIFFERENTIAL    Narrative:     The following orders were created for panel order CBC & Differential.  Procedure                               Abnormality         Status                     ---------                                -----------         ------                     CBC Auto Differential[[id number]]        Abnormal            Final result                 Please view results for these tests on the individual orders.       LOC: Person, Place, Time, and Situation    Telemetry:  Telemetry    Cardiac Monitoring Ordered: yes    EKG:   No orders to display       Medications Given in the ED:   Medications   furosemide (LASIX) injection 80 mg (80 mg Intravenous Given 2/4/25 1632)       Imaging results:  XR Chest 2 View    Result Date: 2/4/2025  Impression: Cardiomegaly and mild pulmonary vascular congestion. Electronically Signed: Andrew Lennon MD  2/4/2025 3:17 PM EST  Workstation ID: [id number]     Social issues:   Social History     Socioeconomic History    Marital status:    Tobacco Use    Smoking status: Former     Types: Cigarettes     Passive exposure: Past    Smokeless tobacco: Never   Vaping Use    Vaping status: Never Used   Substance and Sexual Activity    Alcohol use: Yes    Drug use: Never    Sexual activity: Defer       NIH Stroke Scale:  Interval: (not recorded)  1a. Level of Consciousness: (not recorded)  1b. LOC Questions: (not recorded)  1c. LOC Commands: (not recorded)  2. Best Gaze: (not recorded)  3. Visual: (not recorded)  4. Facial Palsy: (not recorded)  5a. Motor Arm, Left: (not recorded)  5b. Motor Arm, Right: (not recorded)  6a. Motor Leg, Left: (not recorded)  6b. Motor Leg, Right: (not recorded)  7. Limb Ataxia: (not recorded)  8. Sensory: (not recorded)  9. Best Language: (not recorded)  10. Dysarthria: (not recorded)  11. Extinction and Inattention (formerly Neglect): (not recorded)    Total (NIH Stroke Scale): (not recorded)     Additional notable assessment information: patient on room air, no increased work of breathing, prefers sitting on bedside.  Shortness of breath with activities.     Nursing report ED to floor:  Report given to Ritika vickers [facility] room 5 in ED    Bessy Gonzalez RN   02/04/25 16:36  EST

## 2025-02-05 ENCOUNTER — APPOINTMENT (OUTPATIENT)
Dept: ULTRASOUND IMAGING | Facility: HOSPITAL | Age: 75
DRG: 292 | End: 2025-02-05
Payer: MEDICARE

## 2025-02-05 LAB
ANION GAP SERPL CALCULATED.3IONS-SCNC: 13.6 MMOL/L (ref 5–15)
AORTIC DIMENSIONLESS INDEX: 0.67 (DI)
AV MEAN PRESS GRAD SYS DOP V1V2: 2 MMHG
AV VMAX SYS DOP: 84.2 CM/SEC
B PARAPERT DNA SPEC QL NAA+PROBE: NOT DETECTED
B PERT DNA SPEC QL NAA+PROBE: NOT DETECTED
BACTERIA SPEC AEROBE CULT: NO GROWTH
BASOPHILS # BLD AUTO: 0.06 10*3/MM3 (ref 0–0.2)
BASOPHILS NFR BLD AUTO: 0.8 % (ref 0–1.5)
BH CV ECHO LEFT VENTRICLE GLOBAL LONGITUDINAL STRAIN: -5.5 %
BH CV ECHO MEAS - ACS: 2.5 CM
BH CV ECHO MEAS - AI P1/2T: 588.2 MSEC
BH CV ECHO MEAS - AO MAX PG: 2.8 MMHG
BH CV ECHO MEAS - AO V2 VTI: 13.1 CM
BH CV ECHO MEAS - AVA(I,D): 3.5 CM2
BH CV ECHO MEAS - EDV(CUBED): 343 ML
BH CV ECHO MEAS - EDV(MOD-SP4): 156 ML
BH CV ECHO MEAS - EF(MOD-SP4): 19.9 %
BH CV ECHO MEAS - ESV(CUBED): 274.6 ML
BH CV ECHO MEAS - ESV(MOD-SP4): 125 ML
BH CV ECHO MEAS - FS: 7.1 %
BH CV ECHO MEAS - IVS/LVPW: 1 CM
BH CV ECHO MEAS - IVSD: 1 CM
BH CV ECHO MEAS - LA DIMENSION: 5.3 CM
BH CV ECHO MEAS - LAT PEAK E' VEL: 9.6 CM/SEC
BH CV ECHO MEAS - LV DIASTOLIC VOL/BSA (35-75): 68.8 CM2
BH CV ECHO MEAS - LV MASS(C)D: 321.8 GRAMS
BH CV ECHO MEAS - LV MAX PG: 1.26 MMHG
BH CV ECHO MEAS - LV MEAN PG: 1 MMHG
BH CV ECHO MEAS - LV SYSTOLIC VOL/BSA (12-30): 55.1 CM2
BH CV ECHO MEAS - LV V1 MAX: 56.2 CM/SEC
BH CV ECHO MEAS - LV V1 VTI: 8.6 CM
BH CV ECHO MEAS - LVIDD: 7 CM
BH CV ECHO MEAS - LVIDS: 6.5 CM
BH CV ECHO MEAS - LVOT AREA: 5.3 CM2
BH CV ECHO MEAS - LVOT DIAM: 2.6 CM
BH CV ECHO MEAS - LVPWD: 1 CM
BH CV ECHO MEAS - MED PEAK E' VEL: 3.9 CM/SEC
BH CV ECHO MEAS - MR MAX PG: 66.6 MMHG
BH CV ECHO MEAS - MR MAX VEL: 408 CM/SEC
BH CV ECHO MEAS - MR MEAN PG: 42 MMHG
BH CV ECHO MEAS - MR MEAN VEL: 305 CM/SEC
BH CV ECHO MEAS - MR VTI: 119 CM
BH CV ECHO MEAS - MV A MAX VEL: 49.3 CM/SEC
BH CV ECHO MEAS - MV DEC SLOPE: 680 CM/SEC2
BH CV ECHO MEAS - MV DEC TIME: 0.13 SEC
BH CV ECHO MEAS - MV E MAX VEL: 72.8 CM/SEC
BH CV ECHO MEAS - MV E/A: 1.48
BH CV ECHO MEAS - MV MAX PG: 4.9 MMHG
BH CV ECHO MEAS - MV MEAN PG: 2 MMHG
BH CV ECHO MEAS - MV P1/2T: 46.9 MSEC
BH CV ECHO MEAS - MV V2 VTI: 18.9 CM
BH CV ECHO MEAS - MVA(P1/2T): 4.7 CM2
BH CV ECHO MEAS - MVA(VTI): 2.41 CM2
BH CV ECHO MEAS - PA ACC TIME: 0.07 SEC
BH CV ECHO MEAS - PA V2 MAX: 83.4 CM/SEC
BH CV ECHO MEAS - RV MAX PG: 0.91 MMHG
BH CV ECHO MEAS - RV V1 MAX: 47.6 CM/SEC
BH CV ECHO MEAS - RV V1 VTI: 7.4 CM
BH CV ECHO MEAS - RVDD: 4.2 CM
BH CV ECHO MEAS - SV(LVOT): 45.5 ML
BH CV ECHO MEAS - SV(MOD-SP4): 31 ML
BH CV ECHO MEAS - SVI(LVOT): 20.1 ML/M2
BH CV ECHO MEAS - SVI(MOD-SP4): 13.7 ML/M2
BH CV ECHO MEAS - TAPSE (>1.6): 1.52 CM
BH CV ECHO MEAS - TR MAX PG: 48.7 MMHG
BH CV ECHO MEAS - TR MAX VEL: 349 CM/SEC
BH CV ECHO MEASUREMENTS AVERAGE E/E' RATIO: 10.79
BH CV XLRA - TDI S': 10.8 CM/SEC
BUN SERPL-MCNC: 29 MG/DL (ref 8–23)
BUN/CREAT SERPL: 16.4 (ref 7–25)
C PNEUM DNA NPH QL NAA+NON-PROBE: NOT DETECTED
CALCIUM SPEC-SCNC: 8.9 MG/DL (ref 8.6–10.5)
CHLORIDE SERPL-SCNC: 103 MMOL/L (ref 98–107)
CO2 SERPL-SCNC: 22.4 MMOL/L (ref 22–29)
CREAT SERPL-MCNC: 1.77 MG/DL (ref 0.76–1.27)
CREAT UR-MCNC: 163.8 MG/DL
DEPRECATED RDW RBC AUTO: 50.9 FL (ref 37–54)
EGFRCR SERPLBLD CKD-EPI 2021: 40.3 ML/MIN/1.73
EOSINOPHIL # BLD AUTO: 0.02 10*3/MM3 (ref 0–0.4)
EOSINOPHIL NFR BLD AUTO: 0.3 % (ref 0.3–6.2)
ERYTHROCYTE [DISTWIDTH] IN BLOOD BY AUTOMATED COUNT: 14.8 % (ref 12.3–15.4)
FLUAV SUBTYP SPEC NAA+PROBE: NOT DETECTED
FLUBV RNA ISLT QL NAA+PROBE: NOT DETECTED
GLUCOSE SERPL-MCNC: 130 MG/DL (ref 65–99)
HADV DNA SPEC NAA+PROBE: NOT DETECTED
HCOV 229E RNA SPEC QL NAA+PROBE: NOT DETECTED
HCOV HKU1 RNA SPEC QL NAA+PROBE: NOT DETECTED
HCOV NL63 RNA SPEC QL NAA+PROBE: NOT DETECTED
HCOV OC43 RNA SPEC QL NAA+PROBE: NOT DETECTED
HCT VFR BLD AUTO: 33.2 % (ref 37.5–51)
HGB BLD-MCNC: 10.4 G/DL (ref 13–17.7)
HMPV RNA NPH QL NAA+NON-PROBE: NOT DETECTED
HPIV1 RNA ISLT QL NAA+PROBE: NOT DETECTED
HPIV2 RNA SPEC QL NAA+PROBE: NOT DETECTED
HPIV3 RNA NPH QL NAA+PROBE: NOT DETECTED
HPIV4 P GENE NPH QL NAA+PROBE: NOT DETECTED
IMM GRANULOCYTES # BLD AUTO: 0.02 10*3/MM3 (ref 0–0.05)
IMM GRANULOCYTES NFR BLD AUTO: 0.3 % (ref 0–0.5)
INR PPP: 1.6 (ref 2–3)
LV EF BIPLANE MOD: 20 %
LYMPHOCYTES # BLD AUTO: 1.21 10*3/MM3 (ref 0.7–3.1)
LYMPHOCYTES NFR BLD AUTO: 15.9 % (ref 19.6–45.3)
M PNEUMO IGG SER IA-ACNC: NOT DETECTED
MAGNESIUM SERPL-MCNC: 2.1 MG/DL (ref 1.6–2.4)
MCH RBC QN AUTO: 29.1 PG (ref 26.6–33)
MCHC RBC AUTO-ENTMCNC: 31.3 G/DL (ref 31.5–35.7)
MCV RBC AUTO: 92.7 FL (ref 79–97)
MONOCYTES # BLD AUTO: 0.94 10*3/MM3 (ref 0.1–0.9)
MONOCYTES NFR BLD AUTO: 12.4 % (ref 5–12)
NEUTROPHILS NFR BLD AUTO: 5.36 10*3/MM3 (ref 1.7–7)
NEUTROPHILS NFR BLD AUTO: 70.3 % (ref 42.7–76)
NRBC BLD AUTO-RTO: 0 /100 WBC (ref 0–0.2)
PHOSPHATE SERPL-MCNC: 4.3 MG/DL (ref 2.5–4.5)
PLATELET # BLD AUTO: 180 10*3/MM3 (ref 140–450)
PMV BLD AUTO: 11.7 FL (ref 6–12)
POTASSIUM SERPL-SCNC: 3.8 MMOL/L (ref 3.5–5.2)
PROT ?TM UR-MCNC: 58.7 MG/DL
PROTHROMBIN TIME: 19 SECONDS (ref 19.4–28.5)
RBC # BLD AUTO: 3.58 10*6/MM3 (ref 4.14–5.8)
RHINOVIRUS RNA SPEC NAA+PROBE: NOT DETECTED
RSV RNA NPH QL NAA+NON-PROBE: NOT DETECTED
SARS-COV-2 RNA RESP QL NAA+PROBE: NOT DETECTED
SINUS: 3.8 CM
SODIUM SERPL-SCNC: 139 MMOL/L (ref 136–145)
WBC NRBC COR # BLD AUTO: 7.61 10*3/MM3 (ref 3.4–10.8)

## 2025-02-05 PROCEDURE — 85025 COMPLETE CBC W/AUTO DIFF WBC: CPT | Performed by: INTERNAL MEDICINE

## 2025-02-05 PROCEDURE — 82570 ASSAY OF URINE CREATININE: CPT | Performed by: INTERNAL MEDICINE

## 2025-02-05 PROCEDURE — 80048 BASIC METABOLIC PNL TOTAL CA: CPT | Performed by: INTERNAL MEDICINE

## 2025-02-05 PROCEDURE — 0202U NFCT DS 22 TRGT SARS-COV-2: CPT | Performed by: INTERNAL MEDICINE

## 2025-02-05 PROCEDURE — 97161 PT EVAL LOW COMPLEX 20 MIN: CPT

## 2025-02-05 PROCEDURE — 84100 ASSAY OF PHOSPHORUS: CPT | Performed by: INTERNAL MEDICINE

## 2025-02-05 PROCEDURE — 25010000002 ENOXAPARIN PER 10 MG: Performed by: STUDENT IN AN ORGANIZED HEALTH CARE EDUCATION/TRAINING PROGRAM

## 2025-02-05 PROCEDURE — 25010000002 ENOXAPARIN PER 10 MG: Performed by: INTERNAL MEDICINE

## 2025-02-05 PROCEDURE — 76775 US EXAM ABDO BACK WALL LIM: CPT

## 2025-02-05 PROCEDURE — 83735 ASSAY OF MAGNESIUM: CPT | Performed by: INTERNAL MEDICINE

## 2025-02-05 PROCEDURE — 25010000002 BUMETANIDE PER 0.5 MG: Performed by: INTERNAL MEDICINE

## 2025-02-05 PROCEDURE — 99222 1ST HOSP IP/OBS MODERATE 55: CPT | Performed by: INTERNAL MEDICINE

## 2025-02-05 PROCEDURE — 84156 ASSAY OF PROTEIN URINE: CPT | Performed by: INTERNAL MEDICINE

## 2025-02-05 PROCEDURE — 85610 PROTHROMBIN TIME: CPT | Performed by: INTERNAL MEDICINE

## 2025-02-05 RX ORDER — SPIRONOLACTONE 25 MG/1
25 TABLET ORAL DAILY
Status: DISCONTINUED | OUTPATIENT
Start: 2025-02-05 | End: 2025-02-09 | Stop reason: HOSPADM

## 2025-02-05 RX ORDER — GUAIFENESIN 200 MG/10ML
200 LIQUID ORAL EVERY 4 HOURS PRN
Status: DISCONTINUED | OUTPATIENT
Start: 2025-02-05 | End: 2025-02-09 | Stop reason: HOSPADM

## 2025-02-05 RX ORDER — CARVEDILOL 3.12 MG/1
3.12 TABLET ORAL 2 TIMES DAILY WITH MEALS
Status: DISCONTINUED | OUTPATIENT
Start: 2025-02-05 | End: 2025-02-09 | Stop reason: HOSPADM

## 2025-02-05 RX ORDER — ENOXAPARIN SODIUM 150 MG/ML
1 INJECTION SUBCUTANEOUS EVERY 12 HOURS
Status: DISCONTINUED | OUTPATIENT
Start: 2025-02-05 | End: 2025-02-07

## 2025-02-05 RX ADMIN — BUMETANIDE 1 MG: 0.25 INJECTION INTRAMUSCULAR; INTRAVENOUS at 17:11

## 2025-02-05 RX ADMIN — Medication 10 ML: at 09:38

## 2025-02-05 RX ADMIN — GUAIFENESIN 200 MG: 200 SOLUTION ORAL at 03:39

## 2025-02-05 RX ADMIN — CARVEDILOL 3.12 MG: 3.12 TABLET, FILM COATED ORAL at 09:38

## 2025-02-05 RX ADMIN — BUMETANIDE 1 MG: 0.25 INJECTION INTRAMUSCULAR; INTRAVENOUS at 05:40

## 2025-02-05 RX ADMIN — ENOXAPARIN SODIUM 105 MG: 150 INJECTION SUBCUTANEOUS at 09:38

## 2025-02-05 RX ADMIN — WARFARIN SODIUM 3 MG: 3 TABLET ORAL at 17:11

## 2025-02-05 RX ADMIN — ENOXAPARIN SODIUM 105 MG: 150 INJECTION SUBCUTANEOUS at 20:32

## 2025-02-05 RX ADMIN — SPIRONOLACTONE 25 MG: 25 TABLET ORAL at 09:38

## 2025-02-05 RX ADMIN — Medication 5 MG: at 03:39

## 2025-02-05 RX ADMIN — CARVEDILOL 3.12 MG: 3.12 TABLET, FILM COATED ORAL at 17:11

## 2025-02-05 RX ADMIN — Medication 10 ML: at 20:32

## 2025-02-05 NOTE — THERAPY EVALUATION
Patient Name: Guanakito Styles  : 1950    MRN: [id number]                              Today's Date: 2025       Admit Date: 2025    Visit Dx:     ICD-10-CM ICD-9-CM   1. Acute on chronic congestive heart failure, unspecified heart failure type  I50.9 428.0   2. Anemia, unspecified type  D64.9 285.9   3. CHRISTINA (acute kidney injury)  N17.9 584.9   4. Bacteria in urine  R82.71 791.9   5. Difficulty sleeping  G47.9 780.50     Patient Active Problem List   Diagnosis    Dyspnea, unspecified type    Acute pulmonary embolism    Pulmonary vascular congestion    Elevated glucose    Elevated serum creatinine    Cardiomegaly    Elevated troponin    Congestive heart failure    Acute HFrEF (heart failure with reduced ejection fraction)    Long term (current) use of anticoagulants    Atrial fibrillation    HF (heart failure)     History reviewed. No pertinent past medical history.  Past Surgical History:   Procedure Laterality Date    CARDIAC CATHETERIZATION N/A 3/8/2023    Procedure: Left Heart Cath and coronary angiogram;  Surgeon: Axel Saenz MD;  Location: [facility] CATH INVASIVE LOCATION;  Service: Cardiovascular;  Laterality: N/A;    CARDIAC CATHETERIZATION  3/8/2023    Procedure: Right and Left Heart Cath;  Surgeon: Axel Saenz MD;  Location: [facility] CATH INVASIVE LOCATION;  Service: Cardiovascular;;      General Information       Row Name 25 1153          Physical Therapy Time and Intention    Document Type evaluation  -CR     Mode of Treatment physical therapy  -CR       Row Name 25 1153          General Information    Patient Profile Reviewed yes  -CR     Prior Level of Function independent:;all household mobility;community mobility;ADL's  -CR       Row Name 25 1153          Living Environment    People in Home child(renan), adult;grandchild(renan)  -CR       Row Name 25 1153          Home Main Entrance    Number of Stairs, Main Entrance two  -CR       Row Name 25 1153           Cognition    Orientation Status (Cognition) oriented x 4  -CR       Row Name 02/05/25 1153          Safety Issues/Impairments Affecting Functional Mobility    Impairments Affecting Function (Mobility) endurance/activity tolerance;shortness of breath  -CR               User Key  (r) = Recorded By, (t) = Taken By, (c) = Cosigned By      Initials Name Provider Type    CR Reyes, Carmela, PT Physical Therapist                   Mobility       Row Name 02/05/25 1211          Bed Mobility    Bed Mobility bed mobility (all) activities  -CR     All Activities, [place] (Bed Mobility) independent  -CR       Row Name 02/05/25 1211          Sit-Stand Transfer    Sit-Stand [place] (Transfers) independent  -CR       Row Name 02/05/25 1211          Gait/Stairs (Locomotion)    [place] Level (Gait) standby assist  -CR     Distance in Feet (Gait) 60  -CR     Deviations/Abnormal Patterns (Gait) gait speed decreased  -CR               User Key  (r) = Recorded By, (t) = Taken By, (c) = Cosigned By      Initials Name Provider Type    CR Reyes, Carmela, PT Physical Therapist                   Obj/Interventions       Row Name 02/05/25 1211          Range of Motion Comprehensive    General Range of Motion no range of motion deficits identified  -CR       Row Name 02/05/25 1211          Strength Comprehensive (MMT)    General Manual Muscle Testing (MMT) Assessment no strength deficits identified  -CR       Row Name 02/05/25 1211          Balance    Balance Assessment sitting static balance;sitting dynamic balance;standing static balance;standing dynamic balance  -CR     Static Sitting Balance independent  -CR     Dynamic Sitting Balance independent  -CR     Position, Sitting Balance sitting edge of bed  -CR     Static Standing Balance independent  -CR     Dynamic Standing Balance independent  -CR               User Key  (r) = Recorded By, (t) = Taken By, (c) = Cosigned By      Initials Name Provider Type    CR Reyes,  Joy, PT Physical Therapist                   Goals/Plan    No documentation.                  Clinical Impression       Row Name 02/05/25 1211          Pain    Pretreatment Pain Rating 0/10 - no pain  -CR     Posttreatment Pain Rating 0/10 - no pain  -CR       Row Name 02/05/25 1211          Plan of Care Review    Plan of Care Reviewed With patient  -CR     Outcome Evaluation 73 y/o male admitted on 2/4 due to dyspnea and BLE edema. Patient had apparently ran out of medications x 2 wks. PMH includes htn, afib. Patient lives with daughter and young granchildren who have the flu. Patient independent household and community ambulator , not on supplemental O2. At time of eval, patient remains independent with bed mobility, transfers and ambulation for 60 ft . Noted inc work of breathing during gait but no desat noted on room air. Patient is able to keep up with conversation during activity. Encouraged patient to sit up in recliner versus being in bed at all times. Patient is safe to return home with family and has no further skilled rehab needs at this time.  -CR       Row Name 02/05/25 1211          Therapy Assessment/Plan (PT)    Patient/Family Therapy Goals Statement (PT) home  -CR     Criteria for Skilled Interventions Met (PT) no;no problems identified which require skilled intervention  -CR     Therapy Frequency (PT) evaluation only  -CR     Predicted Duration of Therapy Intervention (PT) dc  -CR       Row Name 02/05/25 1211          Positioning and Restraints    Post Treatment Position bed  -CR     In Bed notified nsg;supine;call light within reach  -CR               User Key  (r) = Recorded By, (t) = Taken By, (c) = Cosigned By      Initials Name Provider Type    CR Reyes, Carmela, PT Physical Therapist                   Outcome Measures       Row Name 02/05/25 1215 02/05/25 0952       How much help from another person do you currently need...    Turning from your back to your side while in flat bed without  using bedrails? 4  -CR 4  -MH    Moving from lying on back to sitting on the side of a flat bed without bedrails? 4  -CR 4  -MH    Moving to and from a bed to a chair (including a wheelchair)? 4  -CR 4  -MH    Standing up from a chair using your arms (e.g., wheelchair, bedside chair)? 4  -CR 4  -MH    Climbing 3-5 steps with a railing? 4  -CR 4  -MH    To walk in hospital room? 4  -CR 4  -MH    AM-PAC 6 Clicks Score (PT) 24  -CR 24  -MH              User Key  (r) = Recorded By, (t) = Taken By, (c) = Cosigned By      Initials Name Provider Type    CR Reyes, Carmela, PT Physical Therapist     Kody Ogden, RN Registered Nurse                                 Physical Therapy Education       Title: PT OT SLP Therapies (Resolved)       Topic: Physical Therapy (Resolved)       Point: Mobility training (Resolved)       Learning Progress Summary            Patient Acceptance, E, VU by  at 2/5/2025 1216    Acceptance, E,TB, VU by  at 2/5/2025 1049                      Point: Home exercise program (Resolved)       Learning Progress Summary            Patient Acceptance, E, VU by CR at 2/5/2025 1216    Acceptance, E,TB, VU by  at 2/5/2025 1049                      Point: Body mechanics (Resolved)       Learning Progress Summary            Patient Acceptance, E, VU by CR at 2/5/2025 1216    Acceptance, E,TB, VU by  at 2/5/2025 1049                      Point: Precautions (Resolved)       Learning Progress Summary            Patient Acceptance, E, VU by  at 2/5/2025 1216    Acceptance, E,TB, VU by  at 2/5/2025 1049                                      User Key       Initials Effective Dates Name Provider Type Discipline    CR 06/16/21 -  Reyes, Carmela, PT Physical Therapist PT     05/16/23 -  Kody Ogden, RN Registered Nurse Nurse                  PT Recommendation and Plan     Outcome Evaluation: 73 y/o male admitted on 2/4 due to dyspnea and BLE edema. Patient had apparently ran out of medications x 2 wks.  PMH includes htn, afib. Patient lives with daughter and young granchildren who have the flu. Patient independent household and community ambulator , not on supplemental O2. At time of eval, patient remains independent with bed mobility, transfers and ambulation for 60 ft . Noted inc work of breathing during gait but no desat noted on room air. Patient is able to keep up with conversation during activity. Encouraged patient to sit up in recliner versus being in bed at all times. Patient is safe to return home with family and has no further skilled rehab needs at this time.     Time Calculation:         PT Charges       Row Name 02/05/25 1216             Time Calculation    Start Time 0908  -CR      Stop Time 0920  -CR      Time Calculation (min) 12 min  -CR      PT Received On 02/05/25  -CR         Time Calculation- PT    Total Timed Code Minutes- PT 0 minute(s)  -CR                User Key  (r) = Recorded By, (t) = Taken By, (c) = Cosigned By      Initials Name Provider Type    CR Reyes, Carmela, PT Physical Therapist                  Therapy Charges for Today       Code Description Service Date Service Provider Modifiers Qty    [id number] HC PT EVAL LOW COMPLEXITY 2 2/5/2025 Reyes, Carmela, PT GP 1            PT G-Codes  AM-PAC 6 Clicks Score (PT): 24  PT Discharge Summary  Anticipated Discharge Disposition (PT): home    Carmela Reyes, PT  2/5/2025

## 2025-02-05 NOTE — CONSULTS
Referring Provider: rCystal Ordoñez MD  Reason for Consultation:  Shortness of breath  Cardiomyopathy  Mitral regurgitation    Patient Care Team:  Provider, No Known as PCP - Axel Weiss MD as Consulting Physician (Cardiology)    Chief complaint shortness of breath    Subjective .     History of present illness:  Guanakito Styles is a 74 y.o. male who presents with history of significant cardiovascular problems and medical problems as outlined in the assessment was admitted to the hospital with history of progressively worsening shortness of breath and bilateral lower extremity edema.  Patient was last seen in 2023 and apparently has not been taking his medications regularly.  Patient denies having any chest discomfort.  Denies having any dizziness or syncope.  Is having any palpitations.  Patient came to the ER and was admitted to the hospital.  Cardiology consultation was requested..         ROS      Since I have last seen, the patient has been without any chest discomfort, shortness of breath, palpitations, dizziness or syncope.  Denies having any headache, abdominal pain, nausea, vomiting, diarrhea, constipation, loss of weight or loss of appetite.  Denies having any excessive bruising, hematuria or blood in the stool.    Review of all systems negative except as indicated      History  History reviewed. No pertinent past medical history.    Past Surgical History:   Procedure Laterality Date    CARDIAC CATHETERIZATION N/A 3/8/2023    Procedure: Left Heart Cath and coronary angiogram;  Surgeon: Axel Saenz MD;  Location: [facility] CATH INVASIVE LOCATION;  Service: Cardiovascular;  Laterality: N/A;    CARDIAC CATHETERIZATION  3/8/2023    Procedure: Right and Left Heart Cath;  Surgeon: Axel Saenz MD;  Location: [facility] CATH INVASIVE LOCATION;  Service: Cardiovascular;;       History reviewed. No pertinent family history.    Social History     Tobacco Use    Smoking status: Former      "Types: Cigarettes     Passive exposure: Past    Smokeless tobacco: Never   Vaping Use    Vaping status: Never Used   Substance Use Topics    Alcohol use: Yes    Drug use: Never        Medications Prior to Admission   Medication Sig Dispense Refill Last Dose/Taking    warfarin (COUMADIN) 3 MG tablet TAKE 1 TABLET BY MOUTH DAILY 14 tablet 0 2/3/2025         Albuterol and Penicillins    Scheduled Meds:bumetanide, 1 mg, Intravenous, Q12H  enoxaparin, 1 mg/kg, Subcutaneous, Q12H  sodium chloride, 10 mL, Intravenous, Q12H  warfarin, 3 mg, Oral, Daily      Continuous Infusions:Pharmacy to dose warfarin,       PRN Meds:.  acetaminophen **OR** acetaminophen **OR** acetaminophen    aluminum-magnesium hydroxide-simethicone    senna-docusate sodium **AND** polyethylene glycol **AND** bisacodyl **AND** bisacodyl    Calcium Replacement - Follow Nurse / BPA Driven Protocol    guaifenesin    Magnesium Cardiology Dose Replacement - Follow Nurse / BPA Driven Protocol    melatonin    nitroglycerin    ondansetron ODT **OR** ondansetron    Pharmacy to dose warfarin    Phosphorus Replacement - Follow Nurse / BPA Driven Protocol    Potassium Replacement - Follow Nurse / BPA Driven Protocol    sodium chloride    sodium chloride    Objective     VITAL SIGNS  Vitals:    02/04/25 2103 02/04/25 2336 02/05/25 0300 02/05/25 0540   BP: 115/80 99/67 117/83 118/77   BP Location: Left arm Left arm Left arm    Patient Position: Lying Lying Lying    Pulse: 102 94     Resp: 17 19 18    Temp: 97.7 °F (36.5 °C) 98.4 °F (36.9 °C)     TempSrc: Oral Oral     SpO2: 94% 93%     Weight: 106 kg (232 lb 12.9 oz)      Height: 177.8 cm (70\")          Flowsheet Rows      Flowsheet Row First Filed Value   Admission Height 180.3 cm (71\") Documented at 02/04/2025 1422   Admission Weight 111 kg (245 lb 6 oz) Documented at 02/04/2025 1422              Intake/Output Summary (Last 24 hours) at 2/5/2025 0604  Last data filed at 2/4/2025 1900  Gross per 24 hour   Intake -- "   Output 1150 ml   Net -1150 ml        TELEMETRY: Sinus rhythm      Physical Exam:  The patient is alert, oriented and in no distress.  Vital signs as noted above.  Head and neck revealed no carotid bruits or jugular venous distention.  No thyromegaly or lymphadenopathy is present  Lungs clear.  No wheezing.  Breath sounds are normal bilaterally.  Heart normal first and second heart sounds.  Grade 2/6 systolic murmur.  No precordial rub is present.  No gallop is present.  Abdomen soft and nontender.  No organomegaly is present.  Extremities with good peripheral pulses.  2-3+ edema  Skin warm and dry.  Musculoskeletal system is grossly normal  CNS grossly normal    Reviewed and updated.    Results Review:   I reviewed the patient's new clinical results.  Lab Results (last 24 hours)       Procedure Component Value Units Date/Time    Basic Metabolic Panel [[id number]]  (Abnormal) Collected: 02/05/25 0247    Specimen: Blood from Arm, Left Updated: 02/05/25 0402     Glucose 130 mg/dL      BUN 29 mg/dL      Creatinine 1.77 mg/dL      Sodium 139 mmol/L      Potassium 3.8 mmol/L      Comment: Specimen hemolyzed.  Result may be falsely elevated.        Chloride 103 mmol/L      CO2 22.4 mmol/L      Calcium 8.9 mg/dL      BUN/Creatinine Ratio 16.4     Anion Gap 13.6 mmol/L      eGFR 40.3 mL/min/1.73     Narrative:      GFR Categories in Chronic Kidney Disease (CKD)      GFR Category          GFR (mL/min/1.73)    Interpretation  G1                     90 or greater         Normal or high (1)  G2                      60-89                Mild decrease (1)  G3a                   45-59                Mild to moderate decrease  G3b                   30-44                Moderate to severe decrease  G4                    15-29                Severe decrease  G5                    14 or less           Kidney failure          (1)In the absence of evidence of kidney disease, neither GFR category G1 or G2 fulfill the criteria for  CKD.    eGFR calculation 2021 CKD-EPI creatinine equation, which does not include race as a factor    Magnesium [[id number]]  (Normal) Collected: 02/05/25 0247    Specimen: Blood from Arm, Left Updated: 02/05/25 0402     Magnesium 2.1 mg/dL     Phosphorus [[id number]]  (Normal) Collected: 02/05/25 0247    Specimen: Blood from Arm, Left Updated: 02/05/25 0400     Phosphorus 4.3 mg/dL     Protime-INR [[id number]]  (Abnormal) Collected: 02/05/25 0247    Specimen: Blood from Arm, Left Updated: 02/05/25 0346     Protime 19.0 Seconds      INR 1.60    CBC & Differential [[id number]]  (Abnormal) Collected: 02/05/25 0247    Specimen: Blood from Arm, Left Updated: 02/05/25 0339    Narrative:      The following orders were created for panel order CBC & Differential.  Procedure                               Abnormality         Status                     ---------                               -----------         ------                     CBC Auto Differential[[id number]]        Abnormal            Final result                 Please view results for these tests on the individual orders.    CBC Auto Differential [[id number]]  (Abnormal) Collected: 02/05/25 0247    Specimen: Blood from Arm, Left Updated: 02/05/25 0339     WBC 7.61 10*3/mm3      RBC 3.58 10*6/mm3      Hemoglobin 10.4 g/dL      Hematocrit 33.2 %      MCV 92.7 fL      MCH 29.1 pg      MCHC 31.3 g/dL      RDW 14.8 %      RDW-SD 50.9 fl      MPV 11.7 fL      Platelets 180 10*3/mm3      Neutrophil % 70.3 %      Lymphocyte % 15.9 %      Monocyte % 12.4 %      Eosinophil % 0.3 %      Basophil % 0.8 %      Immature Grans % 0.3 %      Neutrophils, Absolute 5.36 10*3/mm3      Lymphocytes, Absolute 1.21 10*3/mm3      Monocytes, Absolute 0.94 10*3/mm3      Eosinophils, Absolute 0.02 10*3/mm3      Basophils, Absolute 0.06 10*3/mm3      Immature Grans, Absolute 0.02 10*3/mm3      nRBC 0.0 /100 WBC     High Sensitivity Troponin T 1Hr [[id number]]  (Abnormal) Collected: 02/04/25 1795     Specimen: Blood from Arm, Left Updated: 02/04/25 1855     HS Troponin T 28 ng/L      Troponin T Numeric Delta 4 ng/L      Troponin T % Delta 17    Narrative:      High Sensitive Troponin T Reference Range:  <14.0 ng/L- Negative Female for AMI  <22.0 ng/L- Negative Male for AMI  >=14 - Abnormal Female indicating possible myocardial injury.  >=22 - Abnormal Male indicating possible myocardial injury.   Clinicians would have to utilize clinical acumen, EKG, Troponin, and serial changes to determine if it is an Acute Myocardial Infarction or myocardial injury due to an underlying chronic condition.         High Sensitivity Troponin T [[id number]]  (Abnormal) Collected: 02/04/25 1509    Specimen: Blood Updated: 02/04/25 1818     HS Troponin T 24 ng/L     Narrative:      High Sensitive Troponin T Reference Range:  <14.0 ng/L- Negative Female for AMI  <22.0 ng/L- Negative Male for AMI  >=14 - Abnormal Female indicating possible myocardial injury.  >=22 - Abnormal Male indicating possible myocardial injury.   Clinicians would have to utilize clinical acumen, EKG, Troponin, and serial changes to determine if it is an Acute Myocardial Infarction or myocardial injury due to an underlying chronic condition.         Urinalysis, Microscopic Only - Urine, Clean Catch [[id number]]  (Abnormal) Collected: 02/04/25 1519    Specimen: Urine, Clean Catch Updated: 02/04/25 1610     RBC, UA None Seen /HPF      WBC, UA 6-10 /HPF      Bacteria, UA 2+ /HPF      Squamous Epithelial Cells, UA 3-6 /HPF      Hyaline Casts, UA 3-6 /LPF      Methodology Manual Light Microscopy    Urine Culture - Urine, Urine, Clean Catch [[id number]] Collected: 02/04/25 1519    Specimen: Urine, Clean Catch Updated: 02/04/25 1610    COVID-19, FLU A/B, RSV PCR 1 HR TAT - Swab, Nasopharynx [[id number]]  (Normal) Collected: 02/04/25 1521    Specimen: Swab from Nasopharynx Updated: 02/04/25 1608     COVID19 Not Detected     Influenza A PCR Not Detected     Influenza B  PCR Not Detected     RSV, PCR Not Detected    Narrative:      Fact sheet for providers: https://www.fda.gov/media/617279/download    Fact sheet for patients: https://www.fda.gov/media/104410/download    Test performed by PCR.    Protime-INR [[id number]]  (Abnormal) Collected: 02/04/25 1509    Specimen: Blood Updated: 02/04/25 1607     Protime 18.1 Seconds      INR 1.50    aPTT [[id number]]  (Normal) Collected: 02/04/25 1509    Specimen: Blood Updated: 02/04/25 1607     PTT 31.6 seconds     Basic Metabolic Panel [[id number]]  (Abnormal) Collected: 02/04/25 1509    Specimen: Blood Updated: 02/04/25 1544     Glucose 125 mg/dL      BUN 23 mg/dL      Creatinine 1.52 mg/dL      Sodium 139 mmol/L      Potassium 4.0 mmol/L      Chloride 104 mmol/L      CO2 19.6 mmol/L      Calcium 9.0 mg/dL      BUN/Creatinine Ratio 15.1     Anion Gap 15.4 mmol/L      eGFR 48.4 mL/min/1.73     Narrative:      GFR Categories in Chronic Kidney Disease (CKD)      GFR Category          GFR (mL/min/1.73)    Interpretation  G1                     90 or greater         Normal or high (1)  G2                      60-89                Mild decrease (1)  G3a                   45-59                Mild to moderate decrease  G3b                   30-44                Moderate to severe decrease  G4                    15-29                Severe decrease  G5                    14 or less           Kidney failure          (1)In the absence of evidence of kidney disease, neither GFR category G1 or G2 fulfill the criteria for CKD.    eGFR calculation 2021 CKD-EPI creatinine equation, which does not include race as a factor    BNP [[id number]]  (Abnormal) Collected: 02/04/25 1509    Specimen: Blood Updated: 02/04/25 1544     proBNP 16,707.0 pg/mL     Narrative:      This assay is used as an aid in the diagnosis of individuals suspected of having heart failure. It can be used as an aid in the diagnosis of acute decompensated heart failure (ADHF) in patients  presenting with signs and symptoms of ADHF to the emergency department (ED). In addition, NT-proBNP of <300 pg/mL indicates ADHF is not likely.    Age Range Result Interpretation  NT-proBNP Concentration (pg/mL:      <50             Positive            >450                   Gray                 300-450                    Negative             <300    50-75           Positive            >900                  Gray                300-900                  Negative            <300      >75             Positive            >1800                  Gray                300-1800                  Negative            <300    Urinalysis With Culture If Indicated - Urine, Clean Catch [[id number]]  (Abnormal) Collected: 02/04/25 1519    Specimen: Urine, Clean Catch Updated: 02/04/25 1531     Color, UA Dark Yellow     Appearance, UA Slightly Cloudy     pH, UA <=5.0     Specific Gravity, UA 1.030     Glucose, UA Negative     Ketones, UA Trace     Bilirubin, UA Small (1+)     Comment: Confirmation testing is unavailable.  A serum bilirubin is recommended for further assessment.        Blood, UA Trace     Protein, UA >=300 mg/dL (3+)     Leuk Esterase, UA Trace     Nitrite, UA Negative     Urobilinogen, UA 1.0 E.U./dL    Narrative:      In absence of clinical symptoms, the presence of pyuria, bacteria, and/or nitrites on the urinalysis result does not correlate with infection.    CBC & Differential [[id number]]  (Abnormal) Collected: 02/04/25 1509    Specimen: Blood Updated: 02/04/25 1527    Narrative:      The following orders were created for panel order CBC & Differential.  Procedure                               Abnormality         Status                     ---------                               -----------         ------                     CBC Auto Differential[[id number]]        Abnormal            Final result                 Please view results for these tests on the individual orders.    CBC Auto Differential [[id number]]   (Abnormal) Collected: 02/04/25 1509    Specimen: Blood Updated: 02/04/25 1527     WBC 7.68 10*3/mm3      RBC 3.67 10*6/mm3      Hemoglobin 10.8 g/dL      Hematocrit 34.2 %      MCV 93.2 fL      MCH 29.4 pg      MCHC 31.6 g/dL      RDW 14.9 %      RDW-SD 50.7 fl      MPV 10.9 fL      Platelets 209 10*3/mm3      Neutrophil % 77.2 %      Lymphocyte % 10.3 %      Monocyte % 11.5 %      Eosinophil % 0.1 %      Basophil % 0.4 %      Immature Grans % 0.5 %      Neutrophils, Absolute 5.93 10*3/mm3      Lymphocytes, Absolute 0.79 10*3/mm3      Monocytes, Absolute 0.88 10*3/mm3      Eosinophils, Absolute 0.01 10*3/mm3      Basophils, Absolute 0.03 10*3/mm3      Immature Grans, Absolute 0.04 10*3/mm3      nRBC 0.0 /100 WBC             Imaging Results (Last 24 Hours)       Procedure Component Value Units Date/Time    XR Chest 2 View [[id number]] Collected: 02/04/25 1516     Updated: 02/04/25 1519    Narrative:      XR CHEST 2 VW    Date of Exam: 2/4/2025 2:58 PM EST    Indication: soa    Comparison: 3/6/2023    Findings:  Cardiomegaly. Mediastinal contours within normal limits. Mild pulmonary vascular congestion. Query mild edema.      Impression:      Impression:  Cardiomegaly and mild pulmonary vascular congestion.        Electronically Signed: Andrew Lennon MD    2/4/2025 3:17 PM EST    Workstation ID: [id number]        LAB RESULTS (LAST 7 DAYS)    CBC  Results from last 7 days   Lab Units 02/05/25  0247 02/04/25  1509   WBC 10*3/mm3 7.61 7.68   RBC 10*6/mm3 3.58* 3.67*   HEMOGLOBIN g/dL 10.4* 10.8*   HEMATOCRIT % 33.2* 34.2*   MCV fL 92.7 93.2   PLATELETS 10*3/mm3 180 209       BMP  Results from last 7 days   Lab Units 02/05/25  0247 02/04/25  1509   SODIUM mmol/L 139 139   POTASSIUM mmol/L 3.8 4.0   CHLORIDE mmol/L 103 104   CO2 mmol/L 22.4 19.6*   BUN mg/dL 29* 23   CREATININE mg/dL 1.77* 1.52*   GLUCOSE mg/dL 130* 125*   MAGNESIUM mg/dL 2.1  --    PHOSPHORUS mg/dL 4.3  --        CMP   Results from last 7 days   Lab Units  02/05/25  0247 02/04/25  1509   SODIUM mmol/L 139 139   POTASSIUM mmol/L 3.8 4.0   CHLORIDE mmol/L 103 104   CO2 mmol/L 22.4 19.6*   BUN mg/dL 29* 23   CREATININE mg/dL 1.77* 1.52*   GLUCOSE mg/dL 130* 125*         BNP        TROPONIN  Results from last 7 days   Lab Units 02/04/25  1832   HSTROP T ng/L 28*       CoAg  Results from last 7 days   Lab Units 02/05/25  0247 02/04/25  1509   INR  1.60* 1.50*   APTT seconds  --  31.6       Creatinine Clearance  Estimated Creatinine Clearance: 46 mL/min (A) (by C-G formula based on SCr of 1.77 mg/dL (H)).    ABG        Radiology  XR Chest 2 View    Result Date: 2/4/2025  Impression: Cardiomegaly and mild pulmonary vascular congestion. Electronically Signed: Andrew Lennon MD  2/4/2025 3:17 PM EST  Workstation ID: [id number]       EKG            I personally viewed and interpreted the patient's EKG/Telemetry data:    ECHOCARDIOGRAM:    Results for orders placed during the hospital encounter of 04/27/23    Adult Transthoracic Echo Complete W/ Cont if Necessary Per Protocol    Interpretation Summary    Left ventricular ejection fraction appears to be 26 - 30%.    Estimated right ventricular systolic pressure from tricuspid regurgitation is moderately elevated (45-55 mmHg).    Indications  Nonischemic cardiomyopathy    Technically satisfactory study.  Mitral valve is structurally normal.  Mild mitral regurgitation.  Tricuspid valve is structurally normal.  Aortic valve is structurally normal.  Pulmonic valve could not be well visualized.  No evidence for tricuspid or aortic regurgitation is seen by Doppler study.  Left atrium is normal in size.  Biatrial and biventricular enlargement.  Left ventricle is enlarged with diffuse hypocontractility with ejection fraction of 25%.  Atrial septum is intact.  Aorta is normal.  No pericardial effusion or intracardiac thrombus is seen.    Impression  Structurally and functionally normal cardiac valves except for mild mitral  regurgitation.  Left ventricular enlargement with diffuse hypocontractility with ejection fraction of 25%.      STRESS TEST  Results for orders placed during the hospital encounter of 03/06/23    Stress Test With Myocardial Perfusion One Day    Interpretation Summary  Indications  Shortness of breath  Chest pain    This study was performed under the direct supervision Hemalatha TAVERAS.    Resting ECG  Sinus rhythm left bundle branch block    Patient exercised for 3.17 minutes using the Kristopher protocol.  The maximum heart rate achieved was 110 and maximum systolic blood pressure 100.  Patient did not have any chest discomfort ST-T wave abnormalities but had premature ventricular contractions.    Cardiolite was used as an imaging agent.    Cardiolite images significantly decreased radionuclide uptake in the inferior and posterolateral segments with partial redistribution in the resting images.    Gated SPECT images revealed left ventricle enlargement with severe and diffuse hypocontractility with ejection fraction of 10%.    Impression  ========    Limited exercise tolerance.    Significantly decreased radionuclide uptake in the inferior and posterolateral segments with partial redistribution.    Gated SPECT images revealed significant left ventricle enlargement with severe and diffuse hypocontractility with ejection fraction of 10%.  Possible cardiomyopathy ischemic versus nonischemic.    Suggest clinical correlation and correlation with echocardiographic findings.        Cardiolite (Tc-99m sestamibi) stress test    HEART CATHETERIZATION  Results for orders placed during the hospital encounter of 03/06/23    Cardiac Catheterization/Vascular Study    Conclusion  CARDIAC CATHETERIZATION REPORT    DATE OF PROCEDURE:  3/8/2023    INDICATION FOR PROCEDURE:  Acute systolic congestive heart failure  Cardiomyopathy  Shortness of breath  Abnormal stress Cardiolite test    PROCEDURE PERFORMED:  Right heart catheterization left heart  catheterization, coronary angiography  Left ventricle angiogram was not performed due to pre-existing renal dysfunction.    @@  Moderate conscious sedation was utilized with intravenous Versed and fentanyl administered by registered nurse with continuous ECG, pulse oximetry and hemodynamic monitoring supervised by myself throughout the entire procedure.  Conscious sedation time   30 minutes    I was present with the patient for the duration of moderate sedation and supervised staff who had no other duties and monitored the patient for the entire procedure.    @@    Under usual sterile precautions and 1% Xylocaine filtration right femoral vein and femoral artery were percutaneously punctured and a 7 and 5 Upper sorbian vascular sheaths were respectively inserted.  Gotham-Eliana catheter was used to measure right-sided pressures pulmonary capital wedge pressure was measured.  Gotham-Eliana catheter was removed and subsequently left and right coronary arteriography was performed.  Left ventricular pressures were measured with pigtail catheter.  Left ventricle angiogram was not performed due to pre-existing renal dysfunction.  Patient tolerated the procedure well.  Hemostasis was obtained and patient was returned to the room with intact pulses.      FINDINGS:    1. HEMODYNAMICS:  Left ventricle end-diastolic pressure is normal.  Mean right atrial pressure is 8 right ventricle 42/3 pulmonary artery 47/19 mean pulmonary artery 31 and pulmonary capital wedge pressure is 19.    2. LEFT VENTRICULOGRAPHY:  Not performed due to pre-existing renal dysfunction.    3. CORONARY ANGIOGRAPHY:  Left main coronary artery is normal.  Left anterior descending artery is normal.  Circumflex coronary artery is normal.  Right coronary artery is a large and dominant vessel and is normal.    SUMMARY:  Mild pulmonary hypertension.  Left ventricle angiogram was not performed due to pre-existing renal dysfunction.  Patient has an ejection fraction of 10 to 15%  (echocardiogram)  Normal epicardial coronary arteries.    RECOMMENDATIONS:  GDMT for nonischemic cardiomyopathy as tolerated.  Intensive but cautious diuresis with close observation of renal function.  Patient would benefit from LifeVest.  Modification of risk factors.  Patient would benefit from ICD if left ventricular function does not improve with GDMT.      OTHER:     Assessment & Plan     Principal Problem:    HF (heart failure)    [[[[[[[[[[[[[[[[[[[[[  History  ===========  - Nonischemic cardiomyopathy  Ejection fraction 3/7/2023-10 to 15%  Ejection fraction 4/27/2023-25%     -History of congestive heart failure.  HFrEF    Echocardiogram 2/5/2025  Significant mitral and tricuspid regurgitation is present.  Significant biatrial enlargement.  Significant left ventricle enlargement and severe and diffuse hypocontractility with ejection fraction of less than 20%.  Grade 3 left ventricular diastolic dysfunction is present.  Left ventricular peak systolic global longitudinal strain is -5%.  Right ventricle is enlarged with hypocontractility.  TAPSE 1.52 cm.     Echocardiogram 4/27/2023 revealed left ventricular ejection fraction of 35% and mild mitral regurgitation.  Biatrial and biventricular enlargement is present.     Echocardiogram 3/7/2023 revealed  Moderate mitral and tricuspid regurgitation.  Calculated pulmonary artery pressure is 30 mmHg.  Significant biatrial biventricular enlargement.  Severe and diffuse left ventricular hypocontractility with ejection fraction of 10 to 15%.     Stress Cardiolite test 3/7/2023  Limited exercise tolerance.  Significantly decreased radionuclide uptake in the inferior and posterolateral segments with partial redistribution.  Gated SPECT images revealed significant left ventricle enlargement with severe and diffuse hypocontractility with ejection fraction of 10%.  Possible cardiomyopathy ischemic versus nonischemic.     Cardiac catheterization 3/8/2023 revealed  Mild  pulmonary hypertension.  Left ventricle angiogram was not performed due to pre-existing renal dysfunction.  Patient has an ejection fraction of 10 to 15% (echocardiogram)  Normal epicardial coronary arteries.     - History of small subsegmental PE.     - Minimal elevation of high-sensitivity troponin-crawling at the bottom  34 and 30     - Denies having history of diabetes hypertension or any other medical problems.     - CKD 3  Creatinine 1.3136 2023  BUNs/creatinine 20/1.47- 3/7/2023     Hyperglycemia.     - Non-smoker.     - Allergic to penicillin and albuterol.  ==============  Plan  ==========  Significant shortness of breath.  HFrEF  Patient apparently has not taken his medication for last 2 weeks.  Patient was not seen in the office since March 2023.    Nonischemic cardiomyopathy    Echocardiogram 2/5/2025  Significant mitral and tricuspid regurgitation is present.  Significant biatrial enlargement.  Significant left ventricle enlargement and severe and diffuse hypocontractility with ejection fraction of less than 20%.  Grade 3 left ventricular diastolic dysfunction is present.  Left ventricular peak systolic global longitudinal strain is -5%.  Right ventricle is enlarged with hypocontractility.  TAPSE 1.52 cm.    GDMT with close observation of renal function.  BUNs/creatinine 29/1.77: 2/5/2025.  Dr. Gramajo was seeing him before.    Patient does not have a good understanding of cardiomyopathy and I have tried to educate him today.    History of small pulmonary emboli.  Anticoagulation status reviewed.  Patient is on Coumadin.  INR 1.6.    Patient was prescribed LifeVest in the past with the intention of considering ICD placement/BiV ICD.  Medical noncompliance.    Medications were reviewed and updated.    GDMT.  Patient is on Bumex 1 mg IV every 12 hours subcu Lovenox warfarin.  Start Coreg and spironolactone.  Hold off on ACE inhibitor due to renal dysfunction.    Maximize medical therapy with close attention  to renal function.    Further plan will depend on patient's progress.    Reviewed and updated 2/5/2025.  ]]]]]]]]]]]]]]]]]]]]]                 Axel Saenz MD  02/05/25  06:04 EST

## 2025-02-05 NOTE — PLAN OF CARE
Goal Outcome Evaluation:              Outcome Evaluation: Pt. has been resting throughout the shift with no complaints. Pt. A&Ox4, makes needs known. Pt. had resp. panel done; came back negative. Pt. takes meds whole.

## 2025-02-05 NOTE — PROGRESS NOTES
[facility] MEDICINE SERVICE  DAILY PROGRESS NOTE    NAME: Guanakito Styles  : 1950  MRN: [id number]      LOS: 1 day     PROVIDER OF SERVICE: Crystal Ordoñez MD    Chief Complaint: HF (heart failure)    Subjective:     Interval History:    Patient seen and evaluated at bedside. Reports some continued swelling in lower extremities with intermittent dyspnea. Endorsing good urine output.     Treatment plan discussed with patient. All questions addressed.     Review of Systems:   Denies fevers, chills  Denies chest pain, +edema  +shortness of breath, +cough  Denies nausea, vomiting, diarrhea  Denies dysuria, hematuria    Objective:     Vital Signs  Temp:  [97.7 °F (36.5 °C)-98.4 °F (36.9 °C)] 98.4 °F (36.9 °C)  Heart Rate:  [] 94  Resp:  [16-19] 18  BP: ()/(61-91) 118/77   Body mass index is 33.4 kg/m².    Physical Exam   General: No acute distress, alert and oriented  CV: RRR, + peripheral edema  Pulm: Mild rhonchi at bases, no increased work of breathing  Abd: Soft, nontender, nondistended  Skin: No rashes or lesions on exposed skin  Psych: Appropriate mood and affect    Scheduled Meds   bumetanide, 1 mg, Intravenous, Q12H  enoxaparin, 1 mg/kg, Subcutaneous, Q12H  sodium chloride, 10 mL, Intravenous, Q12H  warfarin, 3 mg, Oral, Daily       PRN Meds     acetaminophen **OR** acetaminophen **OR** acetaminophen    aluminum-magnesium hydroxide-simethicone    senna-docusate sodium **AND** polyethylene glycol **AND** bisacodyl **AND** bisacodyl    Calcium Replacement - Follow Nurse / BPA Driven Protocol    guaifenesin    Magnesium Cardiology Dose Replacement - Follow Nurse / BPA Driven Protocol    melatonin    nitroglycerin    ondansetron ODT **OR** ondansetron    Pharmacy to dose warfarin    Phosphorus Replacement - Follow Nurse / BPA Driven Protocol    Potassium Replacement - Follow Nurse / BPA Driven Protocol    sodium chloride    sodium chloride   Infusions  Pharmacy to dose warfarin,            Diagnostic Data    Results from last 7 days   Lab Units 02/05/25  0247   WBC 10*3/mm3 7.61   HEMOGLOBIN g/dL 10.4*   HEMATOCRIT % 33.2*   PLATELETS 10*3/mm3 180   GLUCOSE mg/dL 130*   CREATININE mg/dL 1.77*   BUN mg/dL 29*   SODIUM mmol/L 139   POTASSIUM mmol/L 3.8   ANION GAP mmol/L 13.6       XR Chest 2 View    Result Date: 2/4/2025  Impression: Cardiomegaly and mild pulmonary vascular congestion. Electronically Signed: Andrew Lennon MD  2/4/2025 3:17 PM EST  Workstation ID: [id number]     Interval results reviewed.    Assessment/Plan:     Acute on chronic heart failure with reduced ejection fraction  - Cardiology consulted, appreciate recs  - Will consult nephrology in setting of CHRISTINA with need for aggressive diuresis  - Continue diuresis  - Resume bb, spironolactone; hold off on ACE given renal dysfunction  - Daily weights, Is/Os, low sodium diet  - Will refer to heart failure clinic    Atrial fibrillation  - Continue warfarin with appropriate INR monitoring, pharmacy to dose  - Patient has not been compliant, will bridge with lovenox  - On coreg, rates generally controlled    Chronic kidney disease  - Nephrology consulted, appreciate recs  - Repeat BMP in AM  - Renal ultrasound pending  - Avoid nephrotoxins    Abnormal urinalysis  - Patient not complaining of urinary symptoms; culture collected  - Will defer abx at this time given no indication    Treatment plan discussed with nursing staff, case management and pharmacy.     VTE Prophylaxis:  Pharmacologic & mechanical VTE prophylaxis orders are present.    Code status is   Code Status and Medical Interventions: CPR (Attempt to Resuscitate); Full Support   Ordered at: 02/04/25 1709     Code Status (Patient has no pulse and is not breathing):    CPR (Attempt to Resuscitate)     Medical Interventions (Patient has pulse or is breathing):    Full Support       Plan for disposition: Home 2/8/25    Barriers to discharge: Cardiology and nephrology following, renal  dysfunction, IV diuresis, ultrasound pending    Time: 35+ minutes     Signature: Electronically signed by Crystal Ordoñez MD, 02/05/25, 08:08 EST.  [facility] Hospitalist Team

## 2025-02-05 NOTE — CASE MANAGEMENT/SOCIAL WORK
Discharge Planning Assessment   [facility]     Patient Name: Guanakito Styles  MRN: [id number]  Today's Date: 2/5/2025    Admit Date: 2/4/2025    Plan: Anticipate routine home.   Discharge Needs Assessment       Row Name 02/05/25 1347       Living Environment    People in Home grandchild(renan)    Name(s) of People in Home Granddaughters-Karuna (20 years old) and 16 year-old    Current Living Arrangements home    Potentially Unsafe Housing Conditions none    In the past 12 months has the electric, gas, oil, or water company threatened to shut off services in your home? No    Primary Care Provided by self    Provides Primary Care For no one    Family Caregiver if Needed child(renan), adult    Family Caregiver Names Daughter-Maria G    Quality of Family Relationships unable to assess    Able to Return to Prior Arrangements yes       Resource/Environmental Concerns    Resource/Environmental Concerns none    Transportation Concerns none       Transportation Needs    In the past 12 months, has lack of transportation kept you from medical appointments or from getting medications? no    In the past 12 months, has lack of transportation kept you from meetings, work, or from getting things needed for daily living? No       Food Insecurity    Within the past 12 months, you worried that your food would run out before you got the money to buy more. Sometimes    Within the past 12 months, the food you bought just didn't last and you didn't have money to get more. Sometimes       Transition Planning    Patient/Family Anticipates Transition to home    Patient/Family Anticipated Services at Transition none    Transportation Anticipated car, drives self;family or friend will provide       Discharge Needs Assessment    Readmission Within the Last 30 Days no previous admission in last 30 days    Equipment Currently Used at Home none    Concerns to be Addressed compliance issue;financial/insurance;medication    Do you want help finding or  keeping work or a job? Patient declined    Do you want help with school or training? For example, starting or completing job training or getting a high school diploma, GED or equivalent No    Anticipated Changes Related to Illness none    Equipment Needed After Discharge none    Provided Post Acute Provider List? N/A    Provided Post Acute Provider Quality & Resource List? N/A    Current Discharge Risk chronically ill                   Discharge Plan       Row Name 02/05/25 7705       Plan    Plan Anticipate routine home.    Patient/Family in Agreement with Plan yes    Plan Comments CM met with patient at bedside. Pt lives at home with his 2 granddaughters; he drives and is independent with ADL's. Pt confirms he's never really seen a doctor but daughter set him up for an appt next month at [facility] in [place]. Pharmacy confirmed and agreeable to use [facility] but pt reports he does not have Rx coverage because he can't afford it. Pt denies DME use at home. Daughter Maria G to provide transportation at discharge. SW notified of financial concerns.              Demographic Summary       Row Name 02/05/25 7995       General Information    Admission Type inpatient    Arrived From emergency department    Referral Source admission list    Reason for Consult care coordination/care conference;discharge planning    Preferred Language English       Contact Information    Permission Granted to Share Info With                    Functional Status       Row Name 02/05/25 9234       Functional Status    Usual Activity Tolerance good    Current Activity Tolerance good       Functional Status, IADL    Medications independent    Meal Preparation independent    Housekeeping independent    Laundry independent    Shopping independent    If for any reason you need help with day-to-day activities such as bathing, preparing meals, shopping, managing finances, etc., do you get the help you need? I don't need any help        Employment/    Employment Status retired             Megan Naegele, RN     Office Phone: [phone]  Office Cell: [phone]

## 2025-02-05 NOTE — PLAN OF CARE
Goal Outcome Evaluation:  Plan of Care Reviewed With: patient           Outcome Evaluation: 75 y/o male admitted on 2/4 due to dyspnea and BLE edema. Patient had apparently ran out of medications x 2 wks. PMH includes htn, afib. Patient lives with daughter and young granchildren who have the flu. Patient independent household and community ambulator , not on supplemental O2. At time of eval, patient remains independent with bed mobility, transfers and ambulation for 60 ft . Noted inc work of breathing during gait but no desat noted on room air. Patient is able to keep up with conversation during activity. Encouraged patient to sit up in recliner versus being in bed at all times. Patient is safe to return home with family and has no further skilled rehab needs at this time.    Anticipated Discharge Disposition (PT): home

## 2025-02-05 NOTE — CONSULTS
Nephrology Consult Note                                                [facility]      Patient Identification:  Name: Guanakito Styles  Age: 74 y.o.  Sex: male  :  1950  MRN: [id number]               Requesting Physician: Keanu Sandoval MD  Reason for Consultation: management recommendations      History of Present Illness:    Patient is a 74-year-old white male patient with history of congestive heart failure atrial fibrillation prior acute kidney injury does not see nephrology admitted to the hospital with shortness of breath and increased edema and pulmonary congestion with elevated BNP 16,000 was given 1 dose of Lasix 80 mg IV and started on IV Bumex twice daily was found to have elevated creatinine 1.7 prompting renal consultation  Problem List:    HF (heart failure)     Past Medical History:  History reviewed. No pertinent past medical history.  Past Surgical History:  Past Surgical History:   Procedure Laterality Date    CARDIAC CATHETERIZATION N/A 3/8/2023    Procedure: Left Heart Cath and coronary angiogram;  Surgeon: Axel Saenz MD;  Location: [facility] CATH INVASIVE LOCATION;  Service: Cardiovascular;  Laterality: N/A;    CARDIAC CATHETERIZATION  3/8/2023    Procedure: Right and Left Heart Cath;  Surgeon: Axel Saenz MD;  Location: [facility] CATH INVASIVE LOCATION;  Service: Cardiovascular;;      Home Meds:  Medications Prior to Admission   Medication Sig Dispense Refill Last Dose/Taking    warfarin (COUMADIN) 3 MG tablet TAKE 1 TABLET BY MOUTH DAILY 14 tablet 0 2/3/2025     Current Meds:     Current Facility-Administered Medications:     acetaminophen (TYLENOL) tablet 650 mg, 650 mg, Oral, Q4H PRN **OR** acetaminophen (TYLENOL) 160 MG/5ML oral solution 650 mg, 650 mg, Oral, Q4H PRN **OR** acetaminophen (TYLENOL) suppository 650 mg, 650 mg, Rectal, Q4H PRN, Keanu Sandoval MD    aluminum-magnesium hydroxide-simethicone (MAALOX MAX) 400-400-40 MG/5ML suspension 15 mL, 15  mL, Oral, Q6H PRN, Keanu Sandoval MD    sennosides-docusate (PERICOLACE) 8.6-50 MG per tablet 2 tablet, 2 tablet, Oral, BID PRN **AND** polyethylene glycol (MIRALAX) packet 17 g, 17 g, Oral, Daily PRN **AND** bisacodyl (DULCOLAX) EC tablet 5 mg, 5 mg, Oral, Daily PRN **AND** bisacodyl (DULCOLAX) suppository 10 mg, 10 mg, Rectal, Daily PRN, Keanu Sandoval MD    bumetanide (BUMEX) injection 1 mg, 1 mg, Intravenous, Q12H, Keanu Sandoval MD, 1 mg at 02/05/25 0540    Calcium Replacement - Follow Nurse / BPA Driven Protocol, , Not Applicable, PRNJaime Yadana, MD    carvedilol (COREG) tablet 3.125 mg, 3.125 mg, Oral, BID With Meals, Axel Saenz MD, 3.125 mg at 02/05/25 0938    Enoxaparin Sodium (LOVENOX) syringe 105 mg, 1 mg/kg, Subcutaneous, Q12H, Crystal Ordoñez MD    guaifenesin (ROBITUSSIN) 100 MG/5ML liquid 200 mg, 200 mg, Oral, Q4H PRN, Gurpreet Rajput DO, 200 mg at 02/05/25 0339    Magnesium Cardiology Dose Replacement - Follow Nurse / BPA Driven Protocol, , Not Applicable, PRN, Keanu Sandoval MD    melatonin tablet 5 mg, 5 mg, Oral, Nightly PRN, Keanu Sandoval MD, 5 mg at 02/04/25 2223    nitroglycerin (NITROSTAT) SL tablet 0.4 mg, 0.4 mg, Sublingual, Q5 Min PRNJaime Yadana, MD    ondansetron ODT (ZOFRAN-ODT) disintegrating tablet 4 mg, 4 mg, Oral, Q6H PRN **OR** ondansetron (ZOFRAN) injection 4 mg, 4 mg, Intravenous, Q6H PRN, Keanu Sandoval MD    Pharmacy to dose warfarin, , Not Applicable, Continuous PRN, Oo, Yadana, MD    Phosphorus Replacement - Follow Nurse / BPA Driven Protocol, , Not Applicable, Jaime BARRIOS Yadana, MD    Potassium Replacement - Follow Nurse / BPA Driven Protocol, , Not Applicable, Jaime BARRIOS Yadana, MD    sodium chloride 0.9 % flush 10 mL, 10 mL, Intravenous, Q12H, Keanu Sandoval MD, 10 mL at 02/05/25 0938    sodium chloride 0.9 % flush 10 mL, 10 mL, Intravenous, PRN, Keanu Sandoval MD    sodium chloride 0.9 % infusion 40 mL, 40 mL, Intravenous, Jaime BARRIOS Yadana, MD    spironolactone (ALDACTONE) tablet 25 mg,  "25 mg, Oral, Daily, Axel Saenz MD, 25 mg at 02/05/25 0938    warfarin (COUMADIN) tablet 3 mg, 3 mg, Oral, Daily, Keanu Sandoval MD    Allergies:  Allergies   Allergen Reactions    Albuterol Other (See Comments)     Pt reports that breathing is worse with use    Penicillins Unknown - Low Severity     Social History:   Social History     Tobacco Use    Smoking status: Former     Types: Cigarettes     Passive exposure: Past    Smokeless tobacco: Never   Substance Use Topics    Alcohol use: Yes      Family History:  History reviewed. No pertinent family history.     Review of Systems  Reviewed 12 systems were reviewed, all negative except for those mentioned in HPI    Objective:  Vitals:   BP 97/65 (BP Location: Left arm, Patient Position: Lying)   Pulse 104   Temp 97.4 °F (36.3 °C) (Oral)   Resp 20   Ht 177.8 cm (70\")   Wt 106 kg (232 lb 12.9 oz)   SpO2 97%   BMI 33.40 kg/m²   I/O:     Intake/Output Summary (Last 24 hours) at 2/5/2025 1251  Last data filed at 2/5/2025 0700  Gross per 24 hour   Intake 240 ml   Output 1150 ml   Net -910 ml       Exam:  General Appearance:  Alert  Head:  Normocephalic, without obvious abnormality, atraumatic  Eyes:  PERRL, conjunctiva/corneas clear     Neck:  Supple,  no adenopathy;      Lungs:  Decreased BS occasion ronchi  Heart:  Regular rate and rhythm, S1 and S2 normal  Abdomen:  Soft, non-tender, bowel sounds active   Extremities: trace edema  Pulses: 2+ and symmetric all extremities  Skin:  No rashes or lesions  Data Review:  All labs (24hrs):   Recent Results (from the past 24 hours)   Basic Metabolic Panel    Collection Time: 02/04/25  3:09 PM    Specimen: Blood   Result Value Ref Range    Glucose 125 (H) 65 - 99 mg/dL    BUN 23 8 - 23 mg/dL    Creatinine 1.52 (H) 0.76 - 1.27 mg/dL    Sodium 139 136 - 145 mmol/L    Potassium 4.0 3.5 - 5.2 mmol/L    Chloride 104 98 - 107 mmol/L    CO2 19.6 (L) 22.0 - 29.0 mmol/L    Calcium 9.0 8.6 - 10.5 mg/dL    BUN/Creatinine Ratio " 15.1 7.0 - 25.0    Anion Gap 15.4 (H) 5.0 - 15.0 mmol/L    eGFR 48.4 (L) >60.0 mL/min/1.73   BNP    Collection Time: 02/04/25  3:09 PM    Specimen: Blood   Result Value Ref Range    proBNP 16,707.0 (H) 0.0 - 900.0 pg/mL   Protime-INR    Collection Time: 02/04/25  3:09 PM    Specimen: Blood   Result Value Ref Range    Protime 18.1 (L) 19.4 - 28.5 Seconds    INR 1.50 (L) 2.00 - 3.00   aPTT    Collection Time: 02/04/25  3:09 PM    Specimen: Blood   Result Value Ref Range    PTT 31.6 22.7 - 35.4 seconds   CBC Auto Differential    Collection Time: 02/04/25  3:09 PM    Specimen: Blood   Result Value Ref Range    WBC 7.68 3.40 - 10.80 10*3/mm3    RBC 3.67 (L) 4.14 - 5.80 10*6/mm3    Hemoglobin 10.8 (L) 13.0 - 17.7 g/dL    Hematocrit 34.2 (L) 37.5 - 51.0 %    MCV 93.2 79.0 - 97.0 fL    MCH 29.4 26.6 - 33.0 pg    MCHC 31.6 31.5 - 35.7 g/dL    RDW 14.9 12.3 - 15.4 %    RDW-SD 50.7 37.0 - 54.0 fl    MPV 10.9 6.0 - 12.0 fL    Platelets 209 140 - 450 10*3/mm3    Neutrophil % 77.2 (H) 42.7 - 76.0 %    Lymphocyte % 10.3 (L) 19.6 - 45.3 %    Monocyte % 11.5 5.0 - 12.0 %    Eosinophil % 0.1 (L) 0.3 - 6.2 %    Basophil % 0.4 0.0 - 1.5 %    Immature Grans % 0.5 0.0 - 0.5 %    Neutrophils, Absolute 5.93 1.70 - 7.00 10*3/mm3    Lymphocytes, Absolute 0.79 0.70 - 3.10 10*3/mm3    Monocytes, Absolute 0.88 0.10 - 0.90 10*3/mm3    Eosinophils, Absolute 0.01 0.00 - 0.40 10*3/mm3    Basophils, Absolute 0.03 0.00 - 0.20 10*3/mm3    Immature Grans, Absolute 0.04 0.00 - 0.05 10*3/mm3    nRBC 0.0 0.0 - 0.2 /100 WBC   High Sensitivity Troponin T    Collection Time: 02/04/25  3:09 PM    Specimen: Blood   Result Value Ref Range    HS Troponin T 24 (H) <22 ng/L   Urinalysis With Culture If Indicated - Urine, Clean Catch    Collection Time: 02/04/25  3:19 PM    Specimen: Urine, Clean Catch   Result Value Ref Range    Color, UA Dark Yellow (A) Yellow, Straw    Appearance, UA Slightly Cloudy (A) Clear    pH, UA <=5.0 5.0 - 8.0    Specific Only, UA  1.030 1.005 - 1.030    Glucose, UA Negative Negative    Ketones, UA Trace (A) Negative    Bilirubin, UA Small (1+) (A) Negative    Blood, UA Trace (A) Negative    Protein, UA >=300 mg/dL (3+) (A) Negative    Leuk Esterase, UA Trace (A) Negative    Nitrite, UA Negative Negative    Urobilinogen, UA 1.0 E.U./dL 0.2 - 1.0 E.U./dL   Urinalysis, Microscopic Only - Urine, Clean Catch    Collection Time: 02/04/25  3:19 PM    Specimen: Urine, Clean Catch   Result Value Ref Range    RBC, UA None Seen None Seen, 0-2 /HPF    WBC, UA 6-10 (A) None Seen, 0-2 /HPF    Bacteria, UA 2+ (A) None Seen /HPF    Squamous Epithelial Cells, UA 3-6 (A) None Seen, 0-2 /HPF    Hyaline Casts, UA 3-6 None Seen /LPF    Methodology Manual Light Microscopy    COVID-19, FLU A/B, RSV PCR 1 HR TAT - Swab, Nasopharynx    Collection Time: 02/04/25  3:21 PM    Specimen: Nasopharynx; Swab   Result Value Ref Range    COVID19 Not Detected Not Detected - Ref. Range    Influenza A PCR Not Detected Not Detected    Influenza B PCR Not Detected Not Detected    RSV, PCR Not Detected Not Detected   High Sensitivity Troponin T 1Hr    Collection Time: 02/04/25  6:32 PM    Specimen: Arm, Left; Blood   Result Value Ref Range    HS Troponin T 28 (H) <22 ng/L    Troponin T Numeric Delta 4 ng/L    Troponin T % Delta 17 Abnormal if >/= 20%   Adult Transthoracic Echo Complete w/ Color, Spectral and Contrast if Necessary Per Protocol    Collection Time: 02/04/25 10:40 PM   Result Value Ref Range    LV GLOBAL STRAIN  -5.5 %    LVIDd 7.0 cm    LVIDs 6.5 cm    IVSd 1.00 cm    LVPWd 1.00 cm    FS 7.1 %    IVS/LVPW 1.00 cm    ESV(cubed) 274.6 ml    LV Sys Vol (BSA corrected) 55.1 cm2    EDV(cubed) 343.0 ml    LV Bain Vol (BSA corrected) 68.8 cm2    LV mass(C)d 321.8 grams    LVOT area 5.3 cm2    LVOT diam 2.6 cm    EDV(MOD-sp4) 156.0 ml    ESV(MOD-sp4) 125.0 ml    SV(MOD-sp4) 31.0 ml    SVi(MOD-SP4) 13.7 ml/m2    SVi (LVOT) 20.1 ml/m2    EF(MOD-sp4) 19.9 %    MV E max corby 72.8  cm/sec    MV A max [place] 49.3 cm/sec    MV dec time 0.13 sec    MV E/A 1.48     Med Peak E' [place] 3.9 cm/sec    Lat Peak E' [place] 9.6 cm/sec    TR max [place]in 349.0 cm/sec    Avg E/e' ratio 10.79     SV(LVOT) 45.5 ml    RVIDd 4.2 cm    TAPSE (>1.6) 1.52 cm    RV S' 10.8 cm/sec    LA dimension (2D)  5.3 cm    LV V1 max 56.2 cm/sec    LV V1 max PG 1.26 mmHg    LV V1 mean PG 1.00 mmHg    LV V1 VTI 8.6 cm    Ao pk [place] 84.2 cm/sec    Ao max PG 2.8 mmHg    Ao mean PG 2.00 mmHg    Ao V2 VTI 13.1 cm    CAIN(I,D) 3.5 cm2    AI P1/2t 588.2 msec    MV max PG 4.9 mmHg    MV mean PG 2.00 mmHg    MV V2 VTI 18.9 cm    MV P1/2t 46.9 msec    MVA(P1/2t) 4.7 cm2    MVA(VTI) 2.41 cm2    MV dec slope 680.0 cm/sec2    MR max [place]in 408.0 cm/sec    MR max PG 66.6 mmHg    MR mean [place] 305.0 cm/sec    MR mean PG 42.0 mmHg    MR .0 cm    TR max PG 48.7 mmHg    RV V1 max PG 0.91 mmHg    RV V1 max 47.6 cm/sec    RV V1 VTI 7.4 cm    PA V2 max 83.4 cm/sec    PA acc time 0.07 sec    ACS 2.5 cm    Sinus 3.8 cm    EF(MOD-bp) 20.0 %    Dimensionless Index 0.67 (DI)   Basic Metabolic Panel    Collection Time: 02/05/25  2:47 AM    Specimen: Arm, Left; Blood   Result Value Ref Range    Glucose 130 (H) 65 - 99 mg/dL    BUN 29 (H) 8 - 23 mg/dL    Creatinine 1.77 (H) 0.76 - 1.27 mg/dL    Sodium 139 136 - 145 mmol/L    Potassium 3.8 3.5 - 5.2 mmol/L    Chloride 103 98 - 107 mmol/L    CO2 22.4 22.0 - 29.0 mmol/L    Calcium 8.9 8.6 - 10.5 mg/dL    BUN/Creatinine Ratio 16.4 7.0 - 25.0    Anion Gap 13.6 5.0 - 15.0 mmol/L    eGFR 40.3 (L) >60.0 mL/min/1.73   Magnesium    Collection Time: 02/05/25  2:47 AM    Specimen: Arm, Left; Blood   Result Value Ref Range    Magnesium 2.1 1.6 - 2.4 mg/dL   Phosphorus    Collection Time: 02/05/25  2:47 AM    Specimen: Arm, Left; Blood   Result Value Ref Range    Phosphorus 4.3 2.5 - 4.5 mg/dL   Protime-INR    Collection Time: 02/05/25  2:47 AM    Specimen: Arm, Left; Blood   Result Value Ref Range    Protime 19.0 (L) 19.4 - 28.5  Seconds    INR 1.60 (L) 2.00 - 3.00   CBC Auto Differential    Collection Time: 02/05/25  2:47 AM    Specimen: Arm, Left; Blood   Result Value Ref Range    WBC 7.61 3.40 - 10.80 10*3/mm3    RBC 3.58 (L) 4.14 - 5.80 10*6/mm3    Hemoglobin 10.4 (L) 13.0 - 17.7 g/dL    Hematocrit 33.2 (L) 37.5 - 51.0 %    MCV 92.7 79.0 - 97.0 fL    MCH 29.1 26.6 - 33.0 pg    MCHC 31.3 (L) 31.5 - 35.7 g/dL    RDW 14.8 12.3 - 15.4 %    RDW-SD 50.9 37.0 - 54.0 fl    MPV 11.7 6.0 - 12.0 fL    Platelets 180 140 - 450 10*3/mm3    Neutrophil % 70.3 42.7 - 76.0 %    Lymphocyte % 15.9 (L) 19.6 - 45.3 %    Monocyte % 12.4 (H) 5.0 - 12.0 %    Eosinophil % 0.3 0.3 - 6.2 %    Basophil % 0.8 0.0 - 1.5 %    Immature Grans % 0.3 0.0 - 0.5 %    Neutrophils, Absolute 5.36 1.70 - 7.00 10*3/mm3    Lymphocytes, Absolute 1.21 0.70 - 3.10 10*3/mm3    Monocytes, Absolute 0.94 (H) 0.10 - 0.90 10*3/mm3    Eosinophils, Absolute 0.02 0.00 - 0.40 10*3/mm3    Basophils, Absolute 0.06 0.00 - 0.20 10*3/mm3    Immature Grans, Absolute 0.02 0.00 - 0.05 10*3/mm3    nRBC 0.0 0.0 - 0.2 /100 WBC   Respiratory Panel PCR w/COVID-19(SARS-CoV-2) BOOGIE/BIANCA/JANIA/PAD/COR/ROBERT In-House, NP Swab in [facility], 2 HR TAT - Swab, Nasopharynx    Collection Time: 02/05/25 11:31 AM    Specimen: Nasopharynx; Swab   Result Value Ref Range    ADENOVIRUS, PCR Not Detected Not Detected    Coronavirus 229E Not Detected Not Detected    Coronavirus HKU1 Not Detected Not Detected    Coronavirus NL63 Not Detected Not Detected    Coronavirus OC43 Not Detected Not Detected    COVID19 Not Detected Not Detected - Ref. Range    Human Metapneumovirus Not Detected Not Detected    Human Rhinovirus/Enterovirus Not Detected Not Detected    Influenza A PCR Not Detected Not Detected    Influenza B PCR Not Detected Not Detected    Parainfluenza Virus 1 Not Detected Not Detected    Parainfluenza Virus 2 Not Detected Not Detected    Parainfluenza Virus 3 Not Detected Not Detected    Parainfluenza Virus 4 Not Detected  Not Detected    RSV, PCR Not Detected Not Detected    Bordetella pertussis pcr Not Detected Not Detected    Bordetella parapertussis PCR Not Detected Not Detected    Chlamydophila pneumoniae PCR Not Detected Not Detected    Mycoplasma pneumo by PCR Not Detected Not Detected       Current Facility-Administered Medications:     acetaminophen (TYLENOL) tablet 650 mg, 650 mg, Oral, Q4H PRN **OR** acetaminophen (TYLENOL) 160 MG/5ML oral solution 650 mg, 650 mg, Oral, Q4H PRN **OR** acetaminophen (TYLENOL) suppository 650 mg, 650 mg, Rectal, Q4H PRN, Keanu Sandoval MD    aluminum-magnesium hydroxide-simethicone (MAALOX MAX) 400-400-40 MG/5ML suspension 15 mL, 15 mL, Oral, Q6H PRN, Keanu Sandoval MD    sennosides-docusate (PERICOLACE) 8.6-50 MG per tablet 2 tablet, 2 tablet, Oral, BID PRN **AND** polyethylene glycol (MIRALAX) packet 17 g, 17 g, Oral, Daily PRN **AND** bisacodyl (DULCOLAX) EC tablet 5 mg, 5 mg, Oral, Daily PRN **AND** bisacodyl (DULCOLAX) suppository 10 mg, 10 mg, Rectal, Daily PRN, Keanu Sandoval MD    bumetanide (BUMEX) injection 1 mg, 1 mg, Intravenous, Q12H, Keanu Sandoval MD, 1 mg at 02/05/25 0540    Calcium Replacement - Follow Nurse / BPA Driven Protocol, , Not Applicable, PRN, Keanu Sandoval MD    carvedilol (COREG) tablet 3.125 mg, 3.125 mg, Oral, BID With Meals, Axel Saenz MD, 3.125 mg at 02/05/25 0938    Enoxaparin Sodium (LOVENOX) syringe 105 mg, 1 mg/kg, Subcutaneous, Q12H, Crystal Ordoñez MD    guaifenesin (ROBITUSSIN) 100 MG/5ML liquid 200 mg, 200 mg, Oral, Q4H PRN, Gurpreet Rajput DO, 200 mg at 02/05/25 0339    Magnesium Cardiology Dose Replacement - Follow Nurse / BPA Driven Protocol, , Not Applicable, PRN, Keanu Sandoval MD    melatonin tablet 5 mg, 5 mg, Oral, Nightly PRN, Keanu Sandoval MD, 5 mg at 02/04/25 2223    nitroglycerin (NITROSTAT) SL tablet 0.4 mg, 0.4 mg, Sublingual, Q5 Min PRN, Keanu Sandoval MD    ondansetron ODT (ZOFRAN-ODT) disintegrating tablet 4 mg, 4 mg, Oral, Q6H PRN **OR**  ondansetron (ZOFRAN) injection 4 mg, 4 mg, Intravenous, Q6H Jaime BARRIOS Yadana, MD    Pharmacy to dose warfarin, , Not Applicable, Continuous Jaime BARRIOS Yadana, MD    Phosphorus Replacement - Follow Nurse / BPA Driven Protocol, , Not Applicable, Jaime BARRIOS Yadana, MD    Potassium Replacement - Follow Nurse / BPA Driven Protocol, , Not Applicable, Jaime BARRIOS Yadana, MD    sodium chloride 0.9 % flush 10 mL, 10 mL, Intravenous, Q12H, Keanu Sandoval MD, 10 mL at 02/05/25 0938    sodium chloride 0.9 % flush 10 mL, 10 mL, Intravenous, Jaime BARRIOS Yadana, MD    sodium chloride 0.9 % infusion 40 mL, 40 mL, Intravenous, Jaime BARRIOS Yadana, MD    spironolactone (ALDACTONE) tablet 25 mg, 25 mg, Oral, Daily, Axel Saenz MD, 25 mg at 02/05/25 0938    warfarin (COUMADIN) tablet 3 mg, 3 mg, Oral, Daily, Keanu Sandoval MD    Assessment:  Acute kidney injury with possible underlying CKD  Volume excess  CHF  Elevated BNP  Atrial fibrillation  Anemia    Recommendations:  Workup for possible underlying CKD  Urine studies  Renal ultrasound  IV diuresis  If needs cardiac catheterization will need to be prepared to reduce risk of contrast on the kidneys  Avoid nephrotoxic medication  Quantify proteinuria        Derian Castellanos MD  2/5/2025  12:51 EST

## 2025-02-05 NOTE — PROGRESS NOTES
"Pharmacy dosing service  Anticoagulant  Warfarin     Subjective:    Guanakito Styles is a 74 y.o.male being continued on warfarin for Atrial Fibrillation / Flutter.    INR Goal: 2 - 3  Home medication?: warfarin 3 mg PO daily  Bridge Therapy Present?:  Yes, Enoxaparin 105 mg SQ Q12H  Interacting Medications Evaluation (New/Present/Discontinued): n/a  Additional Contributing Factors: aeCHF      Assessment/Plan:    INR remains subtherapeutic today after receiving a boosted dose of 6mg yesterday. Will resume patient's home dose of 3mg daily as there currently is no trend to follow and INR did jump from 1.5 to 1.6 in one day. Pt may require another boosted dose tomorrow.     Pt continues to remain on bridge therapy with enoxaparin 105 mg SQ Q12H.     Continue to monitor and adjust based on INR. Daily INR is ordered.          Date 2/4 2/5          INR 1.5 1.60          Dose 6mg 3mg              Objective:  [Ht: 177.8 cm (70\"); Wt: 106 kg (232 lb 12.9 oz); BMI: Body mass index is 33.4 kg/m².]    Lab Results   Component Value Date    ALBUMIN 3.2 (L) 03/09/2023     Lab Results   Component Value Date    INR 1.60 (L) 02/05/2025    INR 1.50 (L) 02/04/2025    INR 2.20 08/21/2024    PROTIME 19.0 (L) 02/05/2025    PROTIME 18.1 (L) 02/04/2025    PROTIME 13.9 (H) 03/09/2023     Lab Results   Component Value Date    HGB 10.4 (L) 02/05/2025    HGB 10.8 (L) 02/04/2025    HGB 12.1 (L) 03/09/2023     Lab Results   Component Value Date    HCT 33.2 (L) 02/05/2025    HCT 34.2 (L) 02/04/2025    HCT 37.2 (L) 03/09/2023       Martha Velasquez [facility]  02/05/25 11:48 EST       "

## 2025-02-05 NOTE — PLAN OF CARE
Goal Outcome Evaluation:   Patient was having trouble sleeping so was given 5 mg of Melatonin, which did not work, so a call was placed to Dr. Rajput who had given another dose of 5 mg melatonin and was also given robitussin for persistent cough.

## 2025-02-06 LAB
ANION GAP SERPL CALCULATED.3IONS-SCNC: 12.8 MMOL/L (ref 5–15)
BASOPHILS # BLD AUTO: 0.05 10*3/MM3 (ref 0–0.2)
BASOPHILS NFR BLD AUTO: 0.7 % (ref 0–1.5)
BUN SERPL-MCNC: 34 MG/DL (ref 8–23)
BUN/CREAT SERPL: 18.8 (ref 7–25)
CALCIUM SPEC-SCNC: 9 MG/DL (ref 8.6–10.5)
CHLORIDE SERPL-SCNC: 99 MMOL/L (ref 98–107)
CO2 SERPL-SCNC: 25.2 MMOL/L (ref 22–29)
CREAT SERPL-MCNC: 1.81 MG/DL (ref 0.76–1.27)
DEPRECATED RDW RBC AUTO: 51.8 FL (ref 37–54)
EGFRCR SERPLBLD CKD-EPI 2021: 39.2 ML/MIN/1.73
EOSINOPHIL # BLD AUTO: 0.03 10*3/MM3 (ref 0–0.4)
EOSINOPHIL NFR BLD AUTO: 0.4 % (ref 0.3–6.2)
ERYTHROCYTE [DISTWIDTH] IN BLOOD BY AUTOMATED COUNT: 15 % (ref 12.3–15.4)
GLUCOSE SERPL-MCNC: 136 MG/DL (ref 65–99)
HCT VFR BLD AUTO: 34.6 % (ref 37.5–51)
HGB BLD-MCNC: 10.8 G/DL (ref 13–17.7)
IMM GRANULOCYTES # BLD AUTO: 0.02 10*3/MM3 (ref 0–0.05)
IMM GRANULOCYTES NFR BLD AUTO: 0.3 % (ref 0–0.5)
INR PPP: 1.2 (ref 2–3)
LYMPHOCYTES # BLD AUTO: 1.18 10*3/MM3 (ref 0.7–3.1)
LYMPHOCYTES NFR BLD AUTO: 15.4 % (ref 19.6–45.3)
MAGNESIUM SERPL-MCNC: 2.1 MG/DL (ref 1.6–2.4)
MCH RBC QN AUTO: 29.3 PG (ref 26.6–33)
MCHC RBC AUTO-ENTMCNC: 31.2 G/DL (ref 31.5–35.7)
MCV RBC AUTO: 93.8 FL (ref 79–97)
MONOCYTES # BLD AUTO: 0.93 10*3/MM3 (ref 0.1–0.9)
MONOCYTES NFR BLD AUTO: 12.1 % (ref 5–12)
NEUTROPHILS NFR BLD AUTO: 5.47 10*3/MM3 (ref 1.7–7)
NEUTROPHILS NFR BLD AUTO: 71.1 % (ref 42.7–76)
NRBC BLD AUTO-RTO: 0 /100 WBC (ref 0–0.2)
PHOSPHATE SERPL-MCNC: 3.9 MG/DL (ref 2.5–4.5)
PLATELET # BLD AUTO: 195 10*3/MM3 (ref 140–450)
PMV BLD AUTO: 11.1 FL (ref 6–12)
POTASSIUM SERPL-SCNC: 3.6 MMOL/L (ref 3.5–5.2)
PROTHROMBIN TIME: 15.3 SECONDS (ref 19.4–28.5)
RBC # BLD AUTO: 3.69 10*6/MM3 (ref 4.14–5.8)
SODIUM SERPL-SCNC: 137 MMOL/L (ref 136–145)
TSH SERPL DL<=0.05 MIU/L-ACNC: 4.31 UIU/ML (ref 0.27–4.2)
WBC NRBC COR # BLD AUTO: 7.68 10*3/MM3 (ref 3.4–10.8)

## 2025-02-06 PROCEDURE — 85610 PROTHROMBIN TIME: CPT | Performed by: INTERNAL MEDICINE

## 2025-02-06 PROCEDURE — 80048 BASIC METABOLIC PNL TOTAL CA: CPT | Performed by: INTERNAL MEDICINE

## 2025-02-06 PROCEDURE — 25010000002 ENOXAPARIN PER 10 MG: Performed by: STUDENT IN AN ORGANIZED HEALTH CARE EDUCATION/TRAINING PROGRAM

## 2025-02-06 PROCEDURE — 99232 SBSQ HOSP IP/OBS MODERATE 35: CPT | Performed by: INTERNAL MEDICINE

## 2025-02-06 PROCEDURE — 25010000002 BUMETANIDE PER 0.5 MG: Performed by: INTERNAL MEDICINE

## 2025-02-06 PROCEDURE — 85025 COMPLETE CBC W/AUTO DIFF WBC: CPT | Performed by: INTERNAL MEDICINE

## 2025-02-06 PROCEDURE — 83735 ASSAY OF MAGNESIUM: CPT | Performed by: INTERNAL MEDICINE

## 2025-02-06 PROCEDURE — 84100 ASSAY OF PHOSPHORUS: CPT | Performed by: INTERNAL MEDICINE

## 2025-02-06 PROCEDURE — 84443 ASSAY THYROID STIM HORMONE: CPT | Performed by: INTERNAL MEDICINE

## 2025-02-06 RX ADMIN — CARVEDILOL 3.12 MG: 3.12 TABLET, FILM COATED ORAL at 08:47

## 2025-02-06 RX ADMIN — GUAIFENESIN 200 MG: 200 SOLUTION ORAL at 02:06

## 2025-02-06 RX ADMIN — WARFARIN SODIUM 7.5 MG: 5 TABLET ORAL at 17:11

## 2025-02-06 RX ADMIN — SPIRONOLACTONE 25 MG: 25 TABLET ORAL at 08:47

## 2025-02-06 RX ADMIN — Medication 10 ML: at 20:51

## 2025-02-06 RX ADMIN — BUMETANIDE 1 MG: 0.25 INJECTION INTRAMUSCULAR; INTRAVENOUS at 17:11

## 2025-02-06 RX ADMIN — ENOXAPARIN SODIUM 105 MG: 150 INJECTION SUBCUTANEOUS at 08:47

## 2025-02-06 RX ADMIN — CARVEDILOL 3.12 MG: 3.12 TABLET, FILM COATED ORAL at 17:11

## 2025-02-06 RX ADMIN — BUMETANIDE 1 MG: 0.25 INJECTION INTRAMUSCULAR; INTRAVENOUS at 05:34

## 2025-02-06 RX ADMIN — Medication 5 MG: at 02:06

## 2025-02-06 RX ADMIN — Medication 10 ML: at 08:47

## 2025-02-06 NOTE — PLAN OF CARE
Goal Outcome Evaluation:   Patient has slept throughout the night will continue with current care plan.

## 2025-02-06 NOTE — PROGRESS NOTES
"Pharmacy dosing service  Anticoagulant  Warfarin     Subjective:    Guanakito Styles is a 74 y.o.male being continued on warfarin for Atrial Fibrillation / Flutter.    INR Goal: 2 - 3  Home medication?: warfarin 3 mg PO daily  Bridge Therapy Present?:  Yes, Enoxaparin 105 mg SQ Q12H  Interacting Medications Evaluation (New/Present/Discontinued): n/a  Additional Contributing Factors: aeCHF, age       Assessment/Plan:    INR was subtherapeutic upon admission secondary to missed doses. Last INR check with Dr. Saenz's office was on 8/21/24. Patient was on warfarin 3 mg daily and INR was 2.2. Unknown how long patient has been without warfarin but last fill was warfarin 3 mg tablets #14 at [facility] on 12/23/24.    INR remains subtherapeutic today at 1.20 and decreased from yesterday. Will plan to give a bolus dose of warfarin 7.5 mg today to boost INR.    Will continue bridge therapy with treatment dose lovenox. Serum creatinine is trending up. Will monitor renal function for any needed dosing adjustments for lovenox.    Continue to monitor and adjust based on INR. Daily INR is ordered.          Date 2/4 2/5 2/6         INR 1.5 1.60 1.20         Dose 6mg 3mg 7.5mg             Objective:  [Ht: 177.8 cm (70\"); Wt: 105 kg (232 lb 9.4 oz); BMI: Body mass index is 33.37 kg/m².]    Lab Results   Component Value Date    ALBUMIN 3.2 (L) 03/09/2023     Lab Results   Component Value Date    INR 1.20 (L) 02/06/2025    INR 1.60 (L) 02/05/2025    INR 1.50 (L) 02/04/2025    PROTIME 15.3 (L) 02/06/2025    PROTIME 19.0 (L) 02/05/2025    PROTIME 18.1 (L) 02/04/2025     Lab Results   Component Value Date    HGB 10.8 (L) 02/06/2025    HGB 10.4 (L) 02/05/2025    HGB 10.8 (L) 02/04/2025     Lab Results   Component Value Date    HCT 34.6 (L) 02/06/2025    HCT 33.2 (L) 02/05/2025    HCT 34.2 (L) 02/04/2025       Rhiannon Nowak, [facility]  02/06/25 14:38 EST           "

## 2025-02-06 NOTE — PROGRESS NOTES
[facility] MEDICINE SERVICE  DAILY PROGRESS NOTE    NAME: Guanakito Styles  : 1950  MRN: [id number]      LOS: 2 days     PROVIDER OF SERVICE: Crystal Ordoñez MD    Chief Complaint: HF (heart failure)    Subjective:     Interval History:    Patient seen and evaluated at bedside. Swelling improved today. Reports good urine output. No shortness of breath.     Treatment plan discussed with patient. All questions addressed.     Review of Systems:   Denies fevers, chills  Denies chest pain, +edema  Denies shortness of breath, +cough  Denies nausea, vomiting, diarrhea  Denies dysuria, hematuria    Objective:     Vital Signs  Temp:  [97.3 °F (36.3 °C)-97.9 °F (36.6 °C)] 97.3 °F (36.3 °C)  Heart Rate:  [] 96  Resp:  [20-25] 24  BP: ()/(65-82) 102/71   Body mass index is 33.37 kg/m².    Physical Exam   General: No acute distress, alert and oriented  CV: RRR, + peripheral edema  Pulm: Diminished at bases, no increased work of breathing, on RA  Abd: Soft, nontender, nondistended  Skin: No rashes or lesions on exposed skin  Psych: Appropriate mood and affect    Scheduled Meds   bumetanide, 1 mg, Intravenous, Q12H  carvedilol, 3.125 mg, Oral, BID With Meals  enoxaparin, 1 mg/kg, Subcutaneous, Q12H  sodium chloride, 10 mL, Intravenous, Q12H  spironolactone, 25 mg, Oral, Daily  warfarin, 3 mg, Oral, Daily       PRN Meds     acetaminophen **OR** acetaminophen **OR** acetaminophen    aluminum-magnesium hydroxide-simethicone    senna-docusate sodium **AND** polyethylene glycol **AND** bisacodyl **AND** bisacodyl    Calcium Replacement - Follow Nurse / BPA Driven Protocol    guaifenesin    Magnesium Cardiology Dose Replacement - Follow Nurse / BPA Driven Protocol    melatonin    nitroglycerin    ondansetron ODT **OR** ondansetron    Pharmacy to dose warfarin    Phosphorus Replacement - Follow Nurse / BPA Driven Protocol    Potassium Replacement - Follow Nurse / BPA Driven Protocol    sodium chloride     sodium chloride   Infusions  Pharmacy to dose warfarin,           Diagnostic Data    Results from last 7 days   Lab Units 02/06/25  0237   WBC 10*3/mm3 7.68   HEMOGLOBIN g/dL 10.8*   HEMATOCRIT % 34.6*   PLATELETS 10*3/mm3 195   GLUCOSE mg/dL 136*   CREATININE mg/dL 1.81*   BUN mg/dL 34*   SODIUM mmol/L 137   POTASSIUM mmol/L 3.6   ANION GAP mmol/L 12.8       US Renal Bilateral    Result Date: 2/5/2025  Impression: No hydronephrosis or acute sonographic abnormality. Electronically Signed: Lakhwinder Rawls MD  2/5/2025 3:11 PM EST  Workstation ID: [id number]    XR Chest 2 View    Result Date: 2/4/2025  Impression: Cardiomegaly and mild pulmonary vascular congestion. Electronically Signed: Andrew Lennon MD  2/4/2025 3:17 PM EST  Workstation ID: [id number]     Interval results reviewed.    Assessment/Plan:     Acute on chronic heart failure with reduced ejection fraction  - Cardiology consulted, appreciate recs  - Will consult nephrology in setting of CHRISTINA with need for aggressive diuresis  - Continue diuresis  - Resume bb, spironolactone; hold off on ACE given renal dysfunction  - Daily weights, Is/Os, low sodium diet  - Will refer to heart failure clinic    Atrial fibrillation  - Continue warfarin with appropriate INR monitoring, pharmacy to dose  - Patient has not been compliant, will bridge with lovenox  - On coreg, rates generally controlled    Chronic kidney disease  - Nephrology consulted, appreciate recs  - Repeat BMP in AM  - Renal ultrasound noted  - Avoid nephrotoxins    Abnormal urinalysis  - Patient not complaining of urinary symptoms; culture collected  - Will defer abx at this time given no indication    Treatment plan discussed with nursing staff, case management and pharmacy.     VTE Prophylaxis:  Pharmacologic & mechanical VTE prophylaxis orders are present.    Code status is   Code Status and Medical Interventions: CPR (Attempt to Resuscitate); Full Support   Ordered at: 02/04/25 3617     Code Status (Patient  has no pulse and is not breathing):    CPR (Attempt to Resuscitate)     Medical Interventions (Patient has pulse or is breathing):    Full Support       Plan for disposition: Home 2/8/25    Barriers to discharge: Cardiology and nephrology following, renal dysfunction, IV diuresis    Time: 35+ minutes     Signature: Electronically signed by Crystal Ordoñez MD, 02/06/25, 07:46 EST.  [facility]

## 2025-02-06 NOTE — PROGRESS NOTES
"                                                                                                                                      Nephrology  Progress Note                                        [facility]    Patient Identification    Name: Guanakito Styles  Age: 74 y.o.  Sex: male  :  1950  MRN: [id number]      DATE OF SERVICE:  2025        Subective    Feeling better  Breathing better     Objective   Scheduled Meds:bumetanide, 1 mg, Intravenous, Q12H  carvedilol, 3.125 mg, Oral, BID With Meals  enoxaparin, 1 mg/kg, Subcutaneous, Q12H  sodium chloride, 10 mL, Intravenous, Q12H  spironolactone, 25 mg, Oral, Daily  warfarin, 3 mg, Oral, Daily          Continuous Infusions:Pharmacy to dose warfarin,         PRN Meds:  acetaminophen **OR** acetaminophen **OR** acetaminophen    aluminum-magnesium hydroxide-simethicone    senna-docusate sodium **AND** polyethylene glycol **AND** bisacodyl **AND** bisacodyl    Calcium Replacement - Follow Nurse / BPA Driven Protocol    guaifenesin    Magnesium Cardiology Dose Replacement - Follow Nurse / BPA Driven Protocol    melatonin    nitroglycerin    ondansetron ODT **OR** ondansetron    Pharmacy to dose warfarin    Phosphorus Replacement - Follow Nurse / BPA Driven Protocol    Potassium Replacement - Follow Nurse / BPA Driven Protocol    sodium chloride    sodium chloride     Exam:  /71 (BP Location: Left arm, Patient Position: Lying)   Pulse 96   Temp 97.3 °F (36.3 °C) (Oral)   Resp 24   Ht 177.8 cm (70\")   Wt 105 kg (232 lb 9.4 oz)   SpO2 93%   BMI 33.37 kg/m²     Intake/Output last 3 shifts:  I/O last 3 completed shifts:  In: 460 [P.O.:460]  Out: -     Intake/Output this shift:  No intake/output data recorded.    Physical exam:  General Appearance:  Alert  Head:  Normocephalic, without obvious abnormality, atraumatic  Eyes:  PERRL, conjunctiva/corneas clear     Neck:  Supple,  no adenopathy;      Lungs:  Decreased BS occasion " erinn  Heart:  Regular rate and rhythm, S1 and S2 normal  Abdomen:  Soft, non-tender, bowel sounds active   Extremities: trace edema  Pulses: 2+ and symmetric all extremities  Skin:  No rashes or lesions       Data Review:  All labs (24hrs):   Recent Results (from the past 24 hours)   Respiratory Panel PCR w/COVID-19(SARS-CoV-2) BOOGIE/BIANCA/JANIA/PAD/COR/ROBERT In-House, NP Swab in UTM/VTM, 2 HR TAT - Swab, Nasopharynx    Collection Time: 02/05/25 11:31 AM    Specimen: Nasopharynx; Swab   Result Value Ref Range    ADENOVIRUS, PCR Not Detected Not Detected    Coronavirus 229E Not Detected Not Detected    Coronavirus HKU1 Not Detected Not Detected    Coronavirus NL63 Not Detected Not Detected    Coronavirus OC43 Not Detected Not Detected    COVID19 Not Detected Not Detected - Ref. Range    Human Metapneumovirus Not Detected Not Detected    Human Rhinovirus/Enterovirus Not Detected Not Detected    Influenza A PCR Not Detected Not Detected    Influenza B PCR Not Detected Not Detected    Parainfluenza Virus 1 Not Detected Not Detected    Parainfluenza Virus 2 Not Detected Not Detected    Parainfluenza Virus 3 Not Detected Not Detected    Parainfluenza Virus 4 Not Detected Not Detected    RSV, PCR Not Detected Not Detected    Bordetella pertussis pcr Not Detected Not Detected    Bordetella parapertussis PCR Not Detected Not Detected    Chlamydophila pneumoniae PCR Not Detected Not Detected    Mycoplasma pneumo by PCR Not Detected Not Detected   Protein, Urine, Random - Urine, Clean Catch    Collection Time: 02/05/25  4:17 PM    Specimen: Urine, Clean Catch   Result Value Ref Range    Total Protein, Urine 58.7 mg/dL   Creatinine Urine Random (kidney function) GFR component - Urine, Clean Catch    Collection Time: 02/05/25  4:17 PM    Specimen: Urine, Clean Catch   Result Value Ref Range    Creatinine, Urine 163.8 mg/dL   Basic Metabolic Panel    Collection Time: 02/06/25  2:37 AM    Specimen: Arm, Right; Blood   Result Value Ref  Range    Glucose 136 (H) 65 - 99 mg/dL    BUN 34 (H) 8 - 23 mg/dL    Creatinine 1.81 (H) 0.76 - 1.27 mg/dL    Sodium 137 136 - 145 mmol/L    Potassium 3.6 3.5 - 5.2 mmol/L    Chloride 99 98 - 107 mmol/L    CO2 25.2 22.0 - 29.0 mmol/L    Calcium 9.0 8.6 - 10.5 mg/dL    BUN/Creatinine Ratio 18.8 7.0 - 25.0    Anion Gap 12.8 5.0 - 15.0 mmol/L    eGFR 39.2 (L) >60.0 mL/min/1.73   Magnesium    Collection Time: 02/06/25  2:37 AM    Specimen: Arm, Right; Blood   Result Value Ref Range    Magnesium 2.1 1.6 - 2.4 mg/dL   Phosphorus    Collection Time: 02/06/25  2:37 AM    Specimen: Arm, Right; Blood   Result Value Ref Range    Phosphorus 3.9 2.5 - 4.5 mg/dL   Protime-INR    Collection Time: 02/06/25  2:37 AM    Specimen: Arm, Right; Blood   Result Value Ref Range    Protime 15.3 (L) 19.4 - 28.5 Seconds    INR 1.20 (L) 2.00 - 3.00   TSH    Collection Time: 02/06/25  2:37 AM    Specimen: Arm, Right; Blood   Result Value Ref Range    TSH 4.310 (H) 0.270 - 4.200 uIU/mL   CBC Auto Differential    Collection Time: 02/06/25  2:37 AM    Specimen: Arm, Right; Blood   Result Value Ref Range    WBC 7.68 3.40 - 10.80 10*3/mm3    RBC 3.69 (L) 4.14 - 5.80 10*6/mm3    Hemoglobin 10.8 (L) 13.0 - 17.7 g/dL    Hematocrit 34.6 (L) 37.5 - 51.0 %    MCV 93.8 79.0 - 97.0 fL    MCH 29.3 26.6 - 33.0 pg    MCHC 31.2 (L) 31.5 - 35.7 g/dL    RDW 15.0 12.3 - 15.4 %    RDW-SD 51.8 37.0 - 54.0 fl    MPV 11.1 6.0 - 12.0 fL    Platelets 195 140 - 450 10*3/mm3    Neutrophil % 71.1 42.7 - 76.0 %    Lymphocyte % 15.4 (L) 19.6 - 45.3 %    Monocyte % 12.1 (H) 5.0 - 12.0 %    Eosinophil % 0.4 0.3 - 6.2 %    Basophil % 0.7 0.0 - 1.5 %    Immature Grans % 0.3 0.0 - 0.5 %    Neutrophils, Absolute 5.47 1.70 - 7.00 10*3/mm3    Lymphocytes, Absolute 1.18 0.70 - 3.10 10*3/mm3    Monocytes, Absolute 0.93 (H) 0.10 - 0.90 10*3/mm3    Eosinophils, Absolute 0.03 0.00 - 0.40 10*3/mm3    Basophils, Absolute 0.05 0.00 - 0.20 10*3/mm3    Immature Grans, Absolute 0.02 0.00 -  0.05 10*3/mm3    nRBC 0.0 0.0 - 0.2 /100 WBC          Imaging:  US Renal Bilateral    Result Date: 2/5/2025  Impression: No hydronephrosis or acute sonographic abnormality. Electronically Signed: Lakhwinder Rawls MD  2/5/2025 3:11 PM EST  Workstation ID: [id number]    XR Chest 2 View    Result Date: 2/4/2025  Impression: Cardiomegaly and mild pulmonary vascular congestion. Electronically Signed: Andrew Lennon MD  2/4/2025 3:17 PM EST  Workstation ID: [id number]     Assessment/Plan:     HF (heart failure)         Acute kidney injury with possible underlying CKD  Volume excess  CHF  Elevated BNP  Atrial fibrillation  Anemia     Recommendations:   Gentle diuresis creatinine 1.8  Renal ultrasound without obstruction with medical disease  IV diuresis can be switched to oral  If needs cardiac catheterization will need to be prepared to reduce risk of contrast on the kidneys  Avoid nephrotoxic medication  Quantify proteinuria

## 2025-02-06 NOTE — PROGRESS NOTES
CARDIOLOGY PROGRESS NOTE:    Guanakito Styles  74 y.o.  male  1950  [id number]      Referring Provider: Hospitalist    Reason for follow-up: Shortness of breath     Patient Care Team:  Provider, No Known as PCP - Axel Weiss MD as Consulting Physician (Cardiology)    Subjective .  Patient still has shortness of breath and swelling of the feet    Objective lying in bed comfortably     Review of Systems   Constitutional: Negative for fever and malaise/fatigue.   HENT:  Negative for ear pain and nosebleeds.    Eyes:  Negative for blurred vision and double vision.   Cardiovascular:  Negative for chest pain, dyspnea on exertion and palpitations.   Respiratory:  Negative for cough and shortness of breath.    Skin:  Negative for rash.   Musculoskeletal:  Negative for joint pain.   Gastrointestinal:  Negative for abdominal pain, nausea and vomiting.   Neurological:  Negative for focal weakness and headaches.   Psychiatric/Behavioral:  Negative for depression. The patient is not nervous/anxious.    All other systems reviewed and are negative.      Allergies: Albuterol and Penicillins    Scheduled Meds:bumetanide, 1 mg, Intravenous, Q12H  carvedilol, 3.125 mg, Oral, BID With Meals  enoxaparin, 1 mg/kg, Subcutaneous, Q12H  sodium chloride, 10 mL, Intravenous, Q12H  spironolactone, 25 mg, Oral, Daily  warfarin, 3 mg, Oral, Daily      Continuous Infusions:Pharmacy to dose warfarin,       PRN Meds:.  acetaminophen **OR** acetaminophen **OR** acetaminophen    aluminum-magnesium hydroxide-simethicone    senna-docusate sodium **AND** polyethylene glycol **AND** bisacodyl **AND** bisacodyl    Calcium Replacement - Follow Nurse / BPA Driven Protocol    guaifenesin    Magnesium Cardiology Dose Replacement - Follow Nurse / BPA Driven Protocol    melatonin    nitroglycerin    ondansetron ODT **OR** ondansetron    Pharmacy to dose warfarin    Phosphorus Replacement - Follow Nurse / BPA Driven Protocol    Potassium  "Replacement - Follow Nurse / BPA Driven Protocol    sodium chloride    sodium chloride        VITAL SIGNS  Vitals:    02/06/25 0500 02/06/25 0700 02/06/25 0831 02/06/25 1135   BP:  123/86 123/80 106/69   BP Location:  Right arm  Left arm   Patient Position:  Sitting  Lying   Pulse:  90 88 93   Resp:  23  23   Temp:  98.1 °F (36.7 °C)  97.4 °F (36.3 °C)   TempSrc:  Oral  Oral   SpO2: 97% 97% 94% (!) 81%   Weight: 105 kg (232 lb 9.4 oz)      Height:           Flowsheet Rows      Flowsheet Row First Filed Value   Admission Height 180.3 cm (71\") Documented at 02/04/2025 1422   Admission Weight 111 kg (245 lb 6 oz) Documented at 02/04/2025 1422             TELEMETRY: Sinus rhythm    Physical Exam:  Constitutional:       Appearance: Well-developed.   Eyes:      General: No scleral icterus.     Conjunctiva/sclera: Conjunctivae normal.      Pupils: Pupils are equal, round, and reactive to light.   HENT:      Head: Normocephalic and atraumatic.   Neck:      Vascular: No carotid bruit or JVD.   Pulmonary:      Effort: Pulmonary effort is normal.      Breath sounds: Normal breath sounds. No wheezing. No rales.   Cardiovascular:      Normal rate. Regular rhythm.      Murmurs: There is a systolic murmur.   Pulses:     Intact distal pulses.   Edema:     Peripheral edema present.  Abdominal:      General: Bowel sounds are normal.      Palpations: Abdomen is soft.   Musculoskeletal: Normal range of motion.      Cervical back: Normal range of motion and neck supple. Skin:     General: Skin is warm and dry.      Findings: No rash.   Neurological:      Mental Status: Alert.      Comments: No focal deficits          Results Review:   I reviewed the patient's new clinical results.  Lab Results (last 24 hours)       Procedure Component Value Units Date/Time    Basic Metabolic Panel [[id number]]  (Abnormal) Collected: 02/06/25 0237    Specimen: Blood from Arm, Right Updated: 02/06/25 0336     Glucose 136 mg/dL      BUN 34 mg/dL      " Creatinine 1.81 mg/dL      Sodium 137 mmol/L      Potassium 3.6 mmol/L      Chloride 99 mmol/L      CO2 25.2 mmol/L      Calcium 9.0 mg/dL      BUN/Creatinine Ratio 18.8     Anion Gap 12.8 mmol/L      eGFR 39.2 mL/min/1.73     Narrative:      GFR Categories in Chronic Kidney Disease (CKD)      GFR Category          GFR (mL/min/1.73)    Interpretation  G1                     90 or greater         Normal or high (1)  G2                      60-89                Mild decrease (1)  G3a                   45-59                Mild to moderate decrease  G3b                   30-44                Moderate to severe decrease  G4                    15-29                Severe decrease  G5                    14 or less           Kidney failure          (1)In the absence of evidence of kidney disease, neither GFR category G1 or G2 fulfill the criteria for CKD.    eGFR calculation 2021 CKD-EPI creatinine equation, which does not include race as a factor    Magnesium [[id number]]  (Normal) Collected: 02/06/25 0237    Specimen: Blood from Arm, Right Updated: 02/06/25 0336     Magnesium 2.1 mg/dL     Phosphorus [[id number]]  (Normal) Collected: 02/06/25 0237    Specimen: Blood from Arm, Right Updated: 02/06/25 0336     Phosphorus 3.9 mg/dL     TSH [[id number]]  (Abnormal) Collected: 02/06/25 0237    Specimen: Blood from Arm, Right Updated: 02/06/25 0336     TSH 4.310 uIU/mL     Protime-INR [[id number]]  (Abnormal) Collected: 02/06/25 0237    Specimen: Blood from Arm, Right Updated: 02/06/25 0315     Protime 15.3 Seconds      INR 1.20    CBC & Differential [[id number]]  (Abnormal) Collected: 02/06/25 0237    Specimen: Blood from Arm, Right Updated: 02/06/25 0306    Narrative:      The following orders were created for panel order CBC & Differential.  Procedure                               Abnormality         Status                     ---------                               -----------         ------                     CBC Auto  Differential[[id number]]        Abnormal            Final result                 Please view results for these tests on the individual orders.    CBC Auto Differential [[id number]]  (Abnormal) Collected: 02/06/25 0237    Specimen: Blood from Arm, Right Updated: 02/06/25 0306     WBC 7.68 10*3/mm3      RBC 3.69 10*6/mm3      Hemoglobin 10.8 g/dL      Hematocrit 34.6 %      MCV 93.8 fL      MCH 29.3 pg      MCHC 31.2 g/dL      RDW 15.0 %      RDW-SD 51.8 fl      MPV 11.1 fL      Platelets 195 10*3/mm3      Neutrophil % 71.1 %      Lymphocyte % 15.4 %      Monocyte % 12.1 %      Eosinophil % 0.4 %      Basophil % 0.7 %      Immature Grans % 0.3 %      Neutrophils, Absolute 5.47 10*3/mm3      Lymphocytes, Absolute 1.18 10*3/mm3      Monocytes, Absolute 0.93 10*3/mm3      Eosinophils, Absolute 0.03 10*3/mm3      Basophils, Absolute 0.05 10*3/mm3      Immature Grans, Absolute 0.02 10*3/mm3      nRBC 0.0 /100 WBC     Creatinine Urine Random (kidney function) GFR component - Urine, Clean Catch [[id number]] Collected: 02/05/25 1617    Specimen: Urine, Clean Catch Updated: 02/05/25 1955     Creatinine, Urine 163.8 mg/dL     Narrative:      Reference intervals for random urine have not been established.  Clinical usage is dependent upon physician's interpretation in combination with other laboratory tests.       Protein, Urine, Random - Urine, Clean Catch [[id number]] Collected: 02/05/25 1617    Specimen: Urine, Clean Catch Updated: 02/05/25 1714     Total Protein, Urine 58.7 mg/dL     Narrative:      Reference intervals for random urine have not been established.  Clinical usage is dependent upon physician's interpretation in combination with other laboratory tests.       Urine Culture - Urine, Urine, Clean Catch [[id number]]  (Normal) Collected: 02/04/25 1519    Specimen: Urine, Clean Catch Updated: 02/05/25 1409     Urine Culture No growth            Imaging Results (Last 24 Hours)       Procedure Component Value Units Date/Time     US Renal Bilateral [[id number]] Collected: 02/05/25 1510     Updated: 02/05/25 1513    Narrative:      US RENAL BILATERAL    Date of Exam: 2/5/2025 2:58 PM EST    Indication: nato.    Comparison: No comparisons available.    Technique: Grayscale and color Doppler ultrasound evaluation of the kidneys and urinary bladder was performed.      Findings:  The right kidney measures 11.7 x 4.9 x 5.9 cm. The left kidney measures 12 x 6.5 x 5.8 cm. Corticomedullary transition maintained. No hydronephrosis. No debris in the bladder.      Impression:      Impression:  No hydronephrosis or acute sonographic abnormality.            Electronically Signed: Lakhwinder Rawls MD    2/5/2025 3:11 PM EST    Workstation ID: [id number]            EKG      I personally viewed and interpreted the patient's EKG/Telemetry data:    ECHOCARDIOGRAM:  Results for orders placed during the hospital encounter of 02/04/25    Adult Transthoracic Echo Complete w/ Color, Spectral and Contrast if Necessary Per Protocol    Interpretation Summary    Left ventricular ejection fraction appears to be less than 20%.    Indications  Shortness of breath    Technically satisfactory study.  Mitral valve is structurally normal.  Tricuspid valve is structurally normal.  Aortic valve is structurally normal.  Pulmonic valve could not be well visualized.  Significant mitral and tricuspid regurgitation is present.  Significant biatrial enlargement.  Significant left ventricle enlargement and severe and diffuse hypocontractility with ejection fraction of less than 20%.  Grade 3 left ventricular diastolic dysfunction is present.  Left ventricular peak systolic global longitudinal strain is -5%.  Right ventricle is enlarged with hypocontractility.  TAPSE 1.52 cm.  Atrial septum is intact.  Aorta is normal.  IVC dilatation with decreased respiratory variation suggestive of increased right atrial pressure.  No pericardial effusion or intracardiac thrombus is  seen.    Impression  Significant mitral and tricuspid regurgitation is present.  Significant biatrial enlargement.  Significant left ventricle enlargement and severe and diffuse hypocontractility with ejection fraction of less than 20%.  Grade 3 left ventricular diastolic dysfunction is present.  Left ventricular peak systolic global longitudinal strain is -5%.  Right ventricle is enlarged with hypocontractility.  TAPSE 1.52 cm.       STRESS MYOVIEW:  Results for orders placed during the hospital encounter of 03/06/23    Stress Test With Myocardial Perfusion One Day    Interpretation Summary  Indications  Shortness of breath  Chest pain    This study was performed under the direct supervision Hemalatha TAVERAS.    Resting ECG  Sinus rhythm left bundle branch block    Patient exercised for 3.17 minutes using the Kristopher protocol.  The maximum heart rate achieved was 110 and maximum systolic blood pressure 100.  Patient did not have any chest discomfort ST-T wave abnormalities but had premature ventricular contractions.    Cardiolite was used as an imaging agent.    Cardiolite images significantly decreased radionuclide uptake in the inferior and posterolateral segments with partial redistribution in the resting images.    Gated SPECT images revealed left ventricle enlargement with severe and diffuse hypocontractility with ejection fraction of 10%.    Impression  ========    Limited exercise tolerance.    Significantly decreased radionuclide uptake in the inferior and posterolateral segments with partial redistribution.    Gated SPECT images revealed significant left ventricle enlargement with severe and diffuse hypocontractility with ejection fraction of 10%.  Possible cardiomyopathy ischemic versus nonischemic.    Suggest clinical correlation and correlation with echocardiographic findings.       CARDIAC CATHETERIZATION:  Results for orders placed during the hospital encounter of 03/06/23    Cardiac Catheterization/Vascular  Study    Conclusion  CARDIAC CATHETERIZATION REPORT    DATE OF PROCEDURE:  3/8/2023    INDICATION FOR PROCEDURE:  Acute systolic congestive heart failure  Cardiomyopathy  Shortness of breath  Abnormal stress Cardiolite test    PROCEDURE PERFORMED:  Right heart catheterization left heart catheterization, coronary angiography  Left ventricle angiogram was not performed due to pre-existing renal dysfunction.    @@  Moderate conscious sedation was utilized with intravenous Versed and fentanyl administered by registered nurse with continuous ECG, pulse oximetry and hemodynamic monitoring supervised by myself throughout the entire procedure.  Conscious sedation time   30 minutes    I was present with the patient for the duration of moderate sedation and supervised staff who had no other duties and monitored the patient for the entire procedure.    @@    Under usual sterile precautions and 1% Xylocaine filtration right femoral vein and femoral artery were percutaneously punctured and a 7 and 5 Urdu vascular sheaths were respectively inserted.  [place] catheter was used to measure right-sided pressures pulmonary capital wedge pressure was measured.  [place]iana catheter was removed and subsequently left and right coronary arteriography was performed.  Left ventricular pressures were measured with pigtail catheter.  Left ventricle angiogram was not performed due to pre-existing renal dysfunction.  Patient tolerated the procedure well.  Hemostasis was obtained and patient was returned to the room with intact pulses.      FINDINGS:    1. HEMODYNAMICS:  Left ventricle end-diastolic pressure is normal.  Mean right atrial pressure is 8 right ventricle 42/3 pulmonary artery 47/19 mean pulmonary artery 31 and pulmonary capital wedge pressure is 19.    2. LEFT VENTRICULOGRAPHY:  Not performed due to pre-existing renal dysfunction.    3. CORONARY ANGIOGRAPHY:  Left main coronary artery is normal.  Left anterior descending artery is  normal.  Circumflex coronary artery is normal.  Right coronary artery is a large and dominant vessel and is normal.    SUMMARY:  Mild pulmonary hypertension.  Left ventricle angiogram was not performed due to pre-existing renal dysfunction.  Patient has an ejection fraction of 10 to 15% (echocardiogram)  Normal epicardial coronary arteries.    RECOMMENDATIONS:  GDMT for nonischemic cardiomyopathy as tolerated.  Intensive but cautious diuresis with close observation of renal function.  Patient would benefit from LifeVest.  Modification of risk factors.  Patient would benefit from ICD if left ventricular function does not improve with GDMT.       OTHER:         Assessment & Plan     HFrEF  Patient has LV systolic dysfunction with a EF of less than 20% and presented with shortness of breath  Patient is a  his EF of about 30 to 35% but has decreased and hence will ruled out for MI by EKG and enzymes  Patient is currently on medical therapy with carvedilol only but will add additional medicines if his blood pressure and renal function permit.  Patient has normal coronaries in the past.    Valvular heart disease  Patient has moderate mitral valve and tricuspid regurgitation and has LV systolic dysfunction hence will be on medical therapy.    Pulm embolism  Patient had small subsegmental PE and hence he is on warfarin and keep his INR around 2-3.    Chronic renal sufficiency  Patient has chronically insufficiency and is followed by nephrologist.    Atrial fibrillation  Patient has been on beta-blockers with Coreg and warfarin.    I discussed the patients findings and my recommendations with patient and nurse    Franklin Sullivan MD  02/06/25  13:28 EST

## 2025-02-07 LAB
ANION GAP SERPL CALCULATED.3IONS-SCNC: 15.3 MMOL/L (ref 5–15)
BASOPHILS # BLD AUTO: 0.07 10*3/MM3 (ref 0–0.2)
BASOPHILS NFR BLD AUTO: 0.8 % (ref 0–1.5)
BUN SERPL-MCNC: 38 MG/DL (ref 8–23)
BUN/CREAT SERPL: 22 (ref 7–25)
CALCIUM SPEC-SCNC: 9.4 MG/DL (ref 8.6–10.5)
CHLORIDE SERPL-SCNC: 98 MMOL/L (ref 98–107)
CO2 SERPL-SCNC: 24.7 MMOL/L (ref 22–29)
CREAT SERPL-MCNC: 1.73 MG/DL (ref 0.76–1.27)
DEPRECATED RDW RBC AUTO: 52.1 FL (ref 37–54)
EGFRCR SERPLBLD CKD-EPI 2021: 41.4 ML/MIN/1.73
EOSINOPHIL # BLD AUTO: 0.03 10*3/MM3 (ref 0–0.4)
EOSINOPHIL NFR BLD AUTO: 0.4 % (ref 0.3–6.2)
ERYTHROCYTE [DISTWIDTH] IN BLOOD BY AUTOMATED COUNT: 15.1 % (ref 12.3–15.4)
GLUCOSE SERPL-MCNC: 132 MG/DL (ref 65–99)
HCT VFR BLD AUTO: 37.7 % (ref 37.5–51)
HGB BLD-MCNC: 11.4 G/DL (ref 13–17.7)
IMM GRANULOCYTES # BLD AUTO: 0.03 10*3/MM3 (ref 0–0.05)
IMM GRANULOCYTES NFR BLD AUTO: 0.4 % (ref 0–0.5)
INR PPP: 2.01 (ref 2–3)
LYMPHOCYTES # BLD AUTO: 1.2 10*3/MM3 (ref 0.7–3.1)
LYMPHOCYTES NFR BLD AUTO: 14.1 % (ref 19.6–45.3)
MAGNESIUM SERPL-MCNC: 2.2 MG/DL (ref 1.6–2.4)
MCH RBC QN AUTO: 28.4 PG (ref 26.6–33)
MCHC RBC AUTO-ENTMCNC: 30.2 G/DL (ref 31.5–35.7)
MCV RBC AUTO: 94 FL (ref 79–97)
MONOCYTES # BLD AUTO: 0.91 10*3/MM3 (ref 0.1–0.9)
MONOCYTES NFR BLD AUTO: 10.7 % (ref 5–12)
NEUTROPHILS NFR BLD AUTO: 6.28 10*3/MM3 (ref 1.7–7)
NEUTROPHILS NFR BLD AUTO: 73.6 % (ref 42.7–76)
NRBC BLD AUTO-RTO: 0 /100 WBC (ref 0–0.2)
PHOSPHATE SERPL-MCNC: 4.5 MG/DL (ref 2.5–4.5)
PLATELET # BLD AUTO: 235 10*3/MM3 (ref 140–450)
PMV BLD AUTO: 11.7 FL (ref 6–12)
POTASSIUM SERPL-SCNC: 4.1 MMOL/L (ref 3.5–5.2)
PROTHROMBIN TIME: 22.6 SECONDS (ref 19.4–28.5)
RBC # BLD AUTO: 4.01 10*6/MM3 (ref 4.14–5.8)
SODIUM SERPL-SCNC: 138 MMOL/L (ref 136–145)
WBC NRBC COR # BLD AUTO: 8.52 10*3/MM3 (ref 3.4–10.8)

## 2025-02-07 PROCEDURE — 25010000002 ENOXAPARIN PER 10 MG: Performed by: STUDENT IN AN ORGANIZED HEALTH CARE EDUCATION/TRAINING PROGRAM

## 2025-02-07 PROCEDURE — 99232 SBSQ HOSP IP/OBS MODERATE 35: CPT

## 2025-02-07 PROCEDURE — 85610 PROTHROMBIN TIME: CPT | Performed by: INTERNAL MEDICINE

## 2025-02-07 PROCEDURE — 80048 BASIC METABOLIC PNL TOTAL CA: CPT | Performed by: INTERNAL MEDICINE

## 2025-02-07 PROCEDURE — 84100 ASSAY OF PHOSPHORUS: CPT | Performed by: INTERNAL MEDICINE

## 2025-02-07 PROCEDURE — 85025 COMPLETE CBC W/AUTO DIFF WBC: CPT | Performed by: INTERNAL MEDICINE

## 2025-02-07 PROCEDURE — 25010000002 BUMETANIDE PER 0.5 MG: Performed by: INTERNAL MEDICINE

## 2025-02-07 PROCEDURE — 83735 ASSAY OF MAGNESIUM: CPT | Performed by: INTERNAL MEDICINE

## 2025-02-07 RX ORDER — WARFARIN SODIUM 2 MG/1
1 TABLET ORAL
Status: COMPLETED | OUTPATIENT
Start: 2025-02-07 | End: 2025-02-07

## 2025-02-07 RX ORDER — BUMETANIDE 1 MG/1
1 TABLET ORAL
Status: DISCONTINUED | OUTPATIENT
Start: 2025-02-07 | End: 2025-02-09 | Stop reason: HOSPADM

## 2025-02-07 RX ORDER — WARFARIN SODIUM 3 MG/1
3 TABLET ORAL
Status: DISCONTINUED | OUTPATIENT
Start: 2025-02-08 | End: 2025-02-08

## 2025-02-07 RX ADMIN — WARFARIN SODIUM 1 MG: 2 TABLET ORAL at 17:18

## 2025-02-07 RX ADMIN — ENOXAPARIN SODIUM 105 MG: 150 INJECTION SUBCUTANEOUS at 08:30

## 2025-02-07 RX ADMIN — SPIRONOLACTONE 25 MG: 25 TABLET ORAL at 08:30

## 2025-02-07 RX ADMIN — BUMETANIDE 1 MG: 0.25 INJECTION INTRAMUSCULAR; INTRAVENOUS at 05:33

## 2025-02-07 RX ADMIN — Medication 10 ML: at 08:30

## 2025-02-07 RX ADMIN — Medication 10 ML: at 20:29

## 2025-02-07 RX ADMIN — CARVEDILOL 3.12 MG: 3.12 TABLET, FILM COATED ORAL at 08:30

## 2025-02-07 RX ADMIN — BUMETANIDE 1 MG: 1 TABLET ORAL at 17:13

## 2025-02-07 RX ADMIN — CARVEDILOL 3.12 MG: 3.12 TABLET, FILM COATED ORAL at 17:13

## 2025-02-07 RX ADMIN — Medication 5 MG: at 20:29

## 2025-02-07 NOTE — PLAN OF CARE
Goal Outcome Evaluation:   Patient Aox4. Independent BRP. Remains on diuretics and warfarin. Lower extremity edema. No complaints of pain or discomfort today. Remains on RA. Possible discharge with cardiology and neph clearance.

## 2025-02-07 NOTE — PROGRESS NOTES
"                                                                                                                                      Nephrology  Progress Note                                        [facility]    Patient Identification    Name: Guanakito Styles  Age: 74 y.o.  Sex: male  :  1950  MRN: [id number]      DATE OF SERVICE:  2025        Subective    Feeling better  Breathing better     Objective   Scheduled Meds:bumetanide, 1 mg, Intravenous, Q12H  carvedilol, 3.125 mg, Oral, BID With Meals  enoxaparin, 1 mg/kg, Subcutaneous, Q12H  sodium chloride, 10 mL, Intravenous, Q12H  spironolactone, 25 mg, Oral, Daily          Continuous Infusions:Pharmacy to dose warfarin,         PRN Meds:  acetaminophen **OR** acetaminophen **OR** acetaminophen    aluminum-magnesium hydroxide-simethicone    senna-docusate sodium **AND** polyethylene glycol **AND** bisacodyl **AND** bisacodyl    Calcium Replacement - Follow Nurse / BPA Driven Protocol    guaifenesin    Magnesium Cardiology Dose Replacement - Follow Nurse / BPA Driven Protocol    melatonin    nitroglycerin    ondansetron ODT **OR** ondansetron    Pharmacy to dose warfarin    Phosphorus Replacement - Follow Nurse / BPA Driven Protocol    Potassium Replacement - Follow Nurse / BPA Driven Protocol    sodium chloride    sodium chloride     Exam:  /86 (BP Location: Right arm, Patient Position: Sitting)   Pulse 92   Temp 97.6 °F (36.4 °C) (Oral)   Resp 16   Ht 177.8 cm (70\")   Wt 105 kg (231 lb 14.8 oz)   SpO2 95%   BMI 33.28 kg/m²     Intake/Output last 3 shifts:  I/O last 3 completed shifts:  In: 460 [P.O.:460]  Out: -     Intake/Output this shift:  No intake/output data recorded.    Physical exam:  General Appearance:  Alert  Head:  Normocephalic, without obvious abnormality, atraumatic  Eyes:  PERRL, conjunctiva/corneas clear     Neck:  Supple,  no adenopathy;      Lungs:  Decreased BS occasion ronchi  Heart:  Regular rate " and rhythm, S1 and S2 normal  Abdomen:  Soft, non-tender, bowel sounds active   Extremities: trace edema  Pulses: 2+ and symmetric all extremities  Skin:  No rashes or lesions       Data Review:  All labs (24hrs):   Recent Results (from the past 24 hours)   Basic Metabolic Panel    Collection Time: 02/07/25  4:06 AM    Specimen: Arm, Left; Blood   Result Value Ref Range    Glucose 132 (H) 65 - 99 mg/dL    BUN 38 (H) 8 - 23 mg/dL    Creatinine 1.73 (H) 0.76 - 1.27 mg/dL    Sodium 138 136 - 145 mmol/L    Potassium 4.1 3.5 - 5.2 mmol/L    Chloride 98 98 - 107 mmol/L    CO2 24.7 22.0 - 29.0 mmol/L    Calcium 9.4 8.6 - 10.5 mg/dL    BUN/Creatinine Ratio 22.0 7.0 - 25.0    Anion Gap 15.3 (H) 5.0 - 15.0 mmol/L    eGFR 41.4 (L) >60.0 mL/min/1.73   Magnesium    Collection Time: 02/07/25  4:06 AM    Specimen: Arm, Left; Blood   Result Value Ref Range    Magnesium 2.2 1.6 - 2.4 mg/dL   Phosphorus    Collection Time: 02/07/25  4:06 AM    Specimen: Arm, Left; Blood   Result Value Ref Range    Phosphorus 4.5 2.5 - 4.5 mg/dL   Protime-INR    Collection Time: 02/07/25  4:06 AM    Specimen: Arm, Left; Blood   Result Value Ref Range    Protime 22.6 19.4 - 28.5 Seconds    INR 2.01 2.00 - 3.00   CBC Auto Differential    Collection Time: 02/07/25  4:06 AM    Specimen: Arm, Left; Blood   Result Value Ref Range    WBC 8.52 3.40 - 10.80 10*3/mm3    RBC 4.01 (L) 4.14 - 5.80 10*6/mm3    Hemoglobin 11.4 (L) 13.0 - 17.7 g/dL    Hematocrit 37.7 37.5 - 51.0 %    MCV 94.0 79.0 - 97.0 fL    MCH 28.4 26.6 - 33.0 pg    MCHC 30.2 (L) 31.5 - 35.7 g/dL    RDW 15.1 12.3 - 15.4 %    RDW-SD 52.1 37.0 - 54.0 fl    MPV 11.7 6.0 - 12.0 fL    Platelets 235 140 - 450 10*3/mm3    Neutrophil % 73.6 42.7 - 76.0 %    Lymphocyte % 14.1 (L) 19.6 - 45.3 %    Monocyte % 10.7 5.0 - 12.0 %    Eosinophil % 0.4 0.3 - 6.2 %    Basophil % 0.8 0.0 - 1.5 %    Immature Grans % 0.4 0.0 - 0.5 %    Neutrophils, Absolute 6.28 1.70 - 7.00 10*3/mm3    Lymphocytes, Absolute 1.20 0.70  - 3.10 10*3/mm3    Monocytes, Absolute 0.91 (H) 0.10 - 0.90 10*3/mm3    Eosinophils, Absolute 0.03 0.00 - 0.40 10*3/mm3    Basophils, Absolute 0.07 0.00 - 0.20 10*3/mm3    Immature Grans, Absolute 0.03 0.00 - 0.05 10*3/mm3    nRBC 0.0 0.0 - 0.2 /100 WBC          Imaging:  US Renal Bilateral    Result Date: 2/5/2025  Impression: No hydronephrosis or acute sonographic abnormality. Electronically Signed: Lakhwinder Rawls MD  2/5/2025 3:11 PM EST  Workstation ID: [id number]    XR Chest 2 View    Result Date: 2/4/2025  Impression: Cardiomegaly and mild pulmonary vascular congestion. Electronically Signed: Andrew Lennon MD  2/4/2025 3:17 PM EST  Workstation ID: [id number]     Assessment/Plan:     HF (heart failure)         Acute kidney injury with possible underlying CKD  Volume excess  CHF  Elevated BNP  Atrial fibrillation  Anemia     Recommendations:   Gentle diuresis creatinine 1.7 from 1.8  Renal ultrasound without obstruction with medical disease  I switch diuretics to oral  If needs cardiac catheterization will need to be prepared to reduce risk of contrast on the kidneys  Avoid nephrotoxic medication  Quantify proteinuria

## 2025-02-07 NOTE — PROGRESS NOTES
CARDIOLOGY PROGRESS NOTE:    Guanakito Styles  74 y.o.  male  1950  [id number]      Referring Provider: Hospitalist    Reason for follow-up: Shortness of breath     Patient Care Team:  Provider, No Known as PCP - Axel Weiss MD as Consulting Physician (Cardiology)    Subjective .  Patient seen and examined.  Labs and chart reviewed.  Patient reports improvement in lower extremity edema and shortness of breath.    Objective  Patient out of bed to chair resting comfortably on room air     Review of Systems   Constitutional: Negative for fever and malaise/fatigue.   HENT:  Negative for ear pain and nosebleeds.    Eyes:  Negative for blurred vision and double vision.   Cardiovascular:  Negative for chest pain, dyspnea on exertion and palpitations.   Respiratory:  Negative for cough and shortness of breath.    Skin:  Negative for rash.   Musculoskeletal:  Negative for joint pain.   Gastrointestinal:  Negative for abdominal pain, nausea and vomiting.   Neurological:  Negative for focal weakness and headaches.   Psychiatric/Behavioral:  Negative for depression. The patient is not nervous/anxious.    All other systems reviewed and are negative.      Allergies: Albuterol and Penicillins    Scheduled Meds:bumetanide, 1 mg, Oral, BID Diuretics  carvedilol, 3.125 mg, Oral, BID With Meals  sodium chloride, 10 mL, Intravenous, Q12H  spironolactone, 25 mg, Oral, Daily  warfarin, 1 mg, Oral, Once  [START ON 2/8/2025] warfarin, 3 mg, Oral, Daily      Continuous Infusions:Pharmacy to dose warfarin,       PRN Meds:.  acetaminophen **OR** acetaminophen **OR** acetaminophen    aluminum-magnesium hydroxide-simethicone    senna-docusate sodium **AND** polyethylene glycol **AND** bisacodyl **AND** bisacodyl    Calcium Replacement - Follow Nurse / BPA Driven Protocol    guaifenesin    Magnesium Cardiology Dose Replacement - Follow Nurse / BPA Driven Protocol    melatonin    nitroglycerin    ondansetron ODT **OR**  "ondansetron    Pharmacy to dose warfarin    Phosphorus Replacement - Follow Nurse / BPA Driven Protocol    Potassium Replacement - Follow Nurse / BPA Driven Protocol    sodium chloride    sodium chloride        VITAL SIGNS  Vitals:    02/07/25 0300 02/07/25 0615 02/07/25 0740 02/07/25 1149   BP: 115/86  115/75 108/74   BP Location: Right arm  Right arm Right arm   Patient Position: Sitting  Sitting Sitting   Pulse: 92  94 105   Resp: 16  18 25   Temp: 97.6 °F (36.4 °C)  97.5 °F (36.4 °C) 97.1 °F (36.2 °C)   TempSrc: Oral  Oral Oral   SpO2: 95%  94% 98%   Weight:  105 kg (231 lb 14.8 oz)     Height:           Flowsheet Rows      Flowsheet Row First Filed Value   Admission Height 180.3 cm (71\") Documented at 02/04/2025 1422   Admission Weight 111 kg (245 lb 6 oz) Documented at 02/04/2025 1422             TELEMETRY: Sinus rhythm    Physical Exam:  Constitutional:       Appearance: Well-developed.   Eyes:      General: No scleral icterus.     Conjunctiva/sclera: Conjunctivae normal.      Pupils: Pupils are equal, round, and reactive to light.   HENT:      Head: Normocephalic and atraumatic.   Neck:      Vascular: No carotid bruit or JVD.   Pulmonary:      Effort: Pulmonary effort is normal.      Breath sounds: Normal breath sounds. No wheezing. No rales.   Cardiovascular:      Normal rate. Regular rhythm.      Murmurs: There is a systolic murmur.   Pulses:     Intact distal pulses.   Edema:     Peripheral edema present.  Abdominal:      General: Bowel sounds are normal.      Palpations: Abdomen is soft.   Musculoskeletal: Normal range of motion.      Cervical back: Normal range of motion and neck supple. Skin:     General: Skin is warm and dry.      Findings: No rash.   Neurological:      Mental Status: Alert.      Comments: No focal deficits          Results Review:   I reviewed the patient's new clinical results.  Lab Results (last 24 hours)       Procedure Component Value Units Date/Time    Basic Metabolic Panel " [[id number]]  (Abnormal) Collected: 02/07/25 0406    Specimen: Blood from Arm, Left Updated: 02/07/25 0622     Glucose 132 mg/dL      BUN 38 mg/dL      Creatinine 1.73 mg/dL      Sodium 138 mmol/L      Potassium 4.1 mmol/L      Chloride 98 mmol/L      CO2 24.7 mmol/L      Calcium 9.4 mg/dL      BUN/Creatinine Ratio 22.0     Anion Gap 15.3 mmol/L      eGFR 41.4 mL/min/1.73     Narrative:      GFR Categories in Chronic Kidney Disease (CKD)      GFR Category          GFR (mL/min/1.73)    Interpretation  G1                     90 or greater         Normal or high (1)  G2                      60-89                Mild decrease (1)  G3a                   45-59                Mild to moderate decrease  G3b                   30-44                Moderate to severe decrease  G4                    15-29                Severe decrease  G5                    14 or less           Kidney failure          (1)In the absence of evidence of kidney disease, neither GFR category G1 or G2 fulfill the criteria for CKD.    eGFR calculation 2021 CKD-EPI creatinine equation, which does not include race as a factor    Magnesium [[id number]]  (Normal) Collected: 02/07/25 0406    Specimen: Blood from Arm, Left Updated: 02/07/25 0622     Magnesium 2.2 mg/dL     Phosphorus [[id number]]  (Normal) Collected: 02/07/25 0406    Specimen: Blood from Arm, Left Updated: 02/07/25 0622     Phosphorus 4.5 mg/dL     Protime-INR [[id number]]  (Normal) Collected: 02/07/25 0406    Specimen: Blood from Arm, Left Updated: 02/07/25 0528     Protime 22.6 Seconds      INR 2.01    CBC & Differential [[id number]]  (Abnormal) Collected: 02/07/25 0406    Specimen: Blood from Arm, Left Updated: 02/07/25 0456    Narrative:      The following orders were created for panel order CBC & Differential.  Procedure                               Abnormality         Status                     ---------                               -----------         ------                     CBC  Auto Differential[[id number]]        Abnormal            Final result                 Please view results for these tests on the individual orders.    CBC Auto Differential [[id number]]  (Abnormal) Collected: 02/07/25 0406    Specimen: Blood from Arm, Left Updated: 02/07/25 0456     WBC 8.52 10*3/mm3      RBC 4.01 10*6/mm3      Hemoglobin 11.4 g/dL      Hematocrit 37.7 %      MCV 94.0 fL      MCH 28.4 pg      MCHC 30.2 g/dL      RDW 15.1 %      RDW-SD 52.1 fl      MPV 11.7 fL      Platelets 235 10*3/mm3      Neutrophil % 73.6 %      Lymphocyte % 14.1 %      Monocyte % 10.7 %      Eosinophil % 0.4 %      Basophil % 0.8 %      Immature Grans % 0.4 %      Neutrophils, Absolute 6.28 10*3/mm3      Lymphocytes, Absolute 1.20 10*3/mm3      Monocytes, Absolute 0.91 10*3/mm3      Eosinophils, Absolute 0.03 10*3/mm3      Basophils, Absolute 0.07 10*3/mm3      Immature Grans, Absolute 0.03 10*3/mm3      nRBC 0.0 /100 WBC             Imaging Results (Last 24 Hours)       ** No results found for the last 24 hours. **            EKG    I personally viewed and interpreted the patient's EKG/Telemetry data:    ECHOCARDIOGRAM:  Results for orders placed during the hospital encounter of 02/04/25    Adult Transthoracic Echo Complete w/ Color, Spectral and Contrast if Necessary Per Protocol    Interpretation Summary    Left ventricular ejection fraction appears to be less than 20%.    Indications  Shortness of breath    Technically satisfactory study.  Mitral valve is structurally normal.  Tricuspid valve is structurally normal.  Aortic valve is structurally normal.  Pulmonic valve could not be well visualized.  Significant mitral and tricuspid regurgitation is present.  Significant biatrial enlargement.  Significant left ventricle enlargement and severe and diffuse hypocontractility with ejection fraction of less than 20%.  Grade 3 left ventricular diastolic dysfunction is present.  Left ventricular peak systolic global longitudinal  strain is -5%.  Right ventricle is enlarged with hypocontractility.  TAPSE 1.52 cm.  Atrial septum is intact.  Aorta is normal.  IVC dilatation with decreased respiratory variation suggestive of increased right atrial pressure.  No pericardial effusion or intracardiac thrombus is seen.    Impression  Significant mitral and tricuspid regurgitation is present.  Significant biatrial enlargement.  Significant left ventricle enlargement and severe and diffuse hypocontractility with ejection fraction of less than 20%.  Grade 3 left ventricular diastolic dysfunction is present.  Left ventricular peak systolic global longitudinal strain is -5%.  Right ventricle is enlarged with hypocontractility.  TAPSE 1.52 cm.       STRESS MYOVIEW:  Results for orders placed during the hospital encounter of 03/06/23    Stress Test With Myocardial Perfusion One Day    Interpretation Summary  Indications  Shortness of breath  Chest pain    This study was performed under the direct supervision Hemalatha TAVERAS.    Resting ECG  Sinus rhythm left bundle branch block    Patient exercised for 3.17 minutes using the Kristopher protocol.  The maximum heart rate achieved was 110 and maximum systolic blood pressure 100.  Patient did not have any chest discomfort ST-T wave abnormalities but had premature ventricular contractions.    Cardiolite was used as an imaging agent.    Cardiolite images significantly decreased radionuclide uptake in the inferior and posterolateral segments with partial redistribution in the resting images.    Gated SPECT images revealed left ventricle enlargement with severe and diffuse hypocontractility with ejection fraction of 10%.    Impression  ========    Limited exercise tolerance.    Significantly decreased radionuclide uptake in the inferior and posterolateral segments with partial redistribution.    Gated SPECT images revealed significant left ventricle enlargement with severe and diffuse hypocontractility with ejection fraction  of 10%.  Possible cardiomyopathy ischemic versus nonischemic.    Suggest clinical correlation and correlation with echocardiographic findings.       CARDIAC CATHETERIZATION:  Results for orders placed during the hospital encounter of 03/06/23    Cardiac Catheterization/Vascular Study    Conclusion  CARDIAC CATHETERIZATION REPORT    DATE OF PROCEDURE:  3/8/2023    INDICATION FOR PROCEDURE:  Acute systolic congestive heart failure  Cardiomyopathy  Shortness of breath  Abnormal stress Cardiolite test    PROCEDURE PERFORMED:  Right heart catheterization left heart catheterization, coronary angiography  Left ventricle angiogram was not performed due to pre-existing renal dysfunction.    @@  Moderate conscious sedation was utilized with intravenous Versed and fentanyl administered by registered nurse with continuous ECG, pulse oximetry and hemodynamic monitoring supervised by myself throughout the entire procedure.  Conscious sedation time   30 minutes    I was present with the patient for the duration of moderate sedation and supervised staff who had no other duties and monitored the patient for the entire procedure.    @@    Under usual sterile precautions and 1% Xylocaine filtration right femoral vein and femoral artery were percutaneously punctured and a 7 and 5 Niuean vascular sheaths were respectively inserted.  [place]-Eliana catheter was used to measure right-sided pressures pulmonary capital wedge pressure was measured.  [place]-Eliana catheter was removed and subsequently left and right coronary arteriography was performed.  Left ventricular pressures were measured with pigtail catheter.  Left ventricle angiogram was not performed due to pre-existing renal dysfunction.  Patient tolerated the procedure well.  Hemostasis was obtained and patient was returned to the room with intact pulses.      FINDINGS:    1. HEMODYNAMICS:  Left ventricle end-diastolic pressure is normal.  Mean right atrial pressure is 8 right ventricle  42/3 pulmonary artery 47/19 mean pulmonary artery 31 and pulmonary capital wedge pressure is 19.    2. LEFT VENTRICULOGRAPHY:  Not performed due to pre-existing renal dysfunction.    3. CORONARY ANGIOGRAPHY:  Left main coronary artery is normal.  Left anterior descending artery is normal.  Circumflex coronary artery is normal.  Right coronary artery is a large and dominant vessel and is normal.    SUMMARY:  Mild pulmonary hypertension.  Left ventricle angiogram was not performed due to pre-existing renal dysfunction.  Patient has an ejection fraction of 10 to 15% (echocardiogram)  Normal epicardial coronary arteries.    RECOMMENDATIONS:  GDMT for nonischemic cardiomyopathy as tolerated.  Intensive but cautious diuresis with close observation of renal function.  Patient would benefit from LifeVest.  Modification of risk factors.  Patient would benefit from ICD if left ventricular function does not improve with GDMT.       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  HFrEF  proBNP [id number]  Echocardiogram with LVEF of less than 20%, grade 3 diastolic dysfunction, severe mitral and tricuspid valve regurgitation  Previous echocardiogram showed reduced LVEF of 25 to 30%  GDMT to include carvedilol, spironolactone  hydralazine if pressure allows  Initiate SGLT2 inhibitor  No ACE/ARB/ARNI due to renal dysfunction  Diuresis  History of medical noncompliance with LifeVest, closely for possible ICD  Heart failure navigator pathway  CHF instructions reviewed  - Daily weight:  same time every day, same clothing   - Low Salt (sodium) diet, <2 gm   - Watch fluid intake, <1500 mL     Valvular heart disease  Significant atrial and tricuspid valve regurgitation  Beta-blocker, diuresis  Consider hydralazine for afterload reduction if pressure allows  Consider JULIUS for further evaluation if symptoms worsen    History of PE  Warfarin for anticoagulation  INR 2.01    Chronic renal  sufficiency  BUN/creatinine 38/1.73  Nephrology following      I discussed the patients findings and my recommendations with patient and nurse    REMI Kitchen  02/07/25  14:28 EST

## 2025-02-07 NOTE — PROGRESS NOTES
"Pharmacy dosing service  Anticoagulant  Warfarin     Subjective:    Guanakito Styles is a 74 y.o.male being continued on warfarin for Atrial Fibrillation / Flutter.    INR Goal: 2 - 3  Home medication?: warfarin 3 mg PO daily  Bridge Therapy Present?:  No  Interacting Medications Evaluation (New/Present/Discontinued): Bumex (may decrease effects of warfarin), spironolactone (may decrease effects of warfarin)  Additional Contributing Factors: aeCHF, age       Assessment/Plan:    INR was subtherapeutic upon admission secondary to missed doses. Last INR check with Dr. Saenz's office was on 8/21/24. Patient was on warfarin 3 mg daily and INR was 2.2. Unknown how long patient has been without warfarin but last fill was warfarin 3 mg tablets #14 at [facility] on 12/23/24.    INR is therapeutic today after increasing to 2.01 from 1.20. Will give small dose of warfarin 1 mg today after large jump in INR and plan to then resume home dosing of warfarin 3 mg daily.    Lovenox bridge discontinued now that INR is therapeutic.    Continue to monitor and adjust based on INR. Daily INR is ordered.          Date 2/4 2/5 2/6 2/7        INR 1.5 1.60 1.20 2.01        Dose 6mg 3mg 7.5mg 1mg            Objective:  [Ht: 177.8 cm (70\"); Wt: 105 kg (231 lb 14.8 oz); BMI: Body mass index is 33.28 kg/m².]    Lab Results   Component Value Date    ALBUMIN 3.2 (L) 03/09/2023     Lab Results   Component Value Date    INR 2.01 02/07/2025    INR 1.20 (L) 02/06/2025    INR 1.60 (L) 02/05/2025    PROTIME 22.6 02/07/2025    PROTIME 15.3 (L) 02/06/2025    PROTIME 19.0 (L) 02/05/2025     Lab Results   Component Value Date    HGB 11.4 (L) 02/07/2025    HGB 10.8 (L) 02/06/2025    HGB 10.4 (L) 02/05/2025     Lab Results   Component Value Date    HCT 37.7 02/07/2025    HCT 34.6 (L) 02/06/2025    HCT 33.2 (L) 02/05/2025       Rhiannon Nowak, [facility]  02/07/25 09:57 EST             "

## 2025-02-07 NOTE — PROGRESS NOTES
[facility] MEDICINE SERVICE  DAILY PROGRESS NOTE    NAME: Guanakito Styles  : 1950  MRN: [id number]      LOS: 3 days     PROVIDER OF SERVICE: Crystal Ordoñez MD    Chief Complaint: HF (heart failure)    Subjective:     Interval History:    Patient seen and evaluated at bedside. No new complaints this morning. Reports good urine output and improved lower extremity edema.    Treatment plan discussed with patient. All questions addressed.     Review of Systems:   Denies fevers, chills  Denies chest pain, +edema, improving  Denies shortness of breath, +cough (chronic)  Denies nausea, vomiting, diarrhea  Denies dysuria, hematuria    Objective:     Vital Signs  Temp:  [97.4 °F (36.3 °C)-97.7 °F (36.5 °C)] 97.5 °F (36.4 °C)  Heart Rate:  [75-94] 94  Resp:  [16-24] 18  BP: (100-123)/(69-86) 115/75   Body mass index is 33.28 kg/m².    Physical Exam   General: No acute distress, alert and oriented  CV: RRR, + peripheral edema, improving  Pulm: Diminished at bases but improving from prior exam, no increased work of breathing, on RA  Abd: Soft, nontender, nondistended  Skin: No rashes or lesions on exposed skin  Psych: Appropriate mood and affect    Scheduled Meds   bumetanide, 1 mg, Intravenous, Q12H  carvedilol, 3.125 mg, Oral, BID With Meals  enoxaparin, 1 mg/kg, Subcutaneous, Q12H  sodium chloride, 10 mL, Intravenous, Q12H  spironolactone, 25 mg, Oral, Daily       PRN Meds     acetaminophen **OR** acetaminophen **OR** acetaminophen    aluminum-magnesium hydroxide-simethicone    senna-docusate sodium **AND** polyethylene glycol **AND** bisacodyl **AND** bisacodyl    Calcium Replacement - Follow Nurse / BPA Driven Protocol    guaifenesin    Magnesium Cardiology Dose Replacement - Follow Nurse / BPA Driven Protocol    melatonin    nitroglycerin    ondansetron ODT **OR** ondansetron    Pharmacy to dose warfarin    Phosphorus Replacement - Follow Nurse / BPA Driven Protocol    Potassium Replacement - Follow  Nurse / BPA Driven Protocol    sodium chloride    sodium chloride   Infusions  Pharmacy to dose warfarin,           Diagnostic Data    Results from last 7 days   Lab Units 02/07/25  0406   WBC 10*3/mm3 8.52   HEMOGLOBIN g/dL 11.4*   HEMATOCRIT % 37.7   PLATELETS 10*3/mm3 235   GLUCOSE mg/dL 132*   CREATININE mg/dL 1.73*   BUN mg/dL 38*   SODIUM mmol/L 138   POTASSIUM mmol/L 4.1   ANION GAP mmol/L 15.3*       US Renal Bilateral    Result Date: 2/5/2025  Impression: No hydronephrosis or acute sonographic abnormality. Electronically Signed: Lakhwinder Rawls MD  2/5/2025 3:11 PM EST  Workstation ID: [id number]     Interval results reviewed.    Assessment/Plan:     Acute on chronic heart failure with reduced ejection fraction  - Cardiology consulted, appreciate recs  - Will consult nephrology in setting of CHRISTINA with need for aggressive diuresis  - Continue diuresis; transition from IV to oral diuretics today  - Resume bb, spironolactone; hold off on ACE given renal dysfunction  - Daily weights, Is/Os, low sodium diet  - Will refer to heart failure clinic    Atrial fibrillation  - Continue warfarin with appropriate INR monitoring, pharmacy to dose  - Patient has not been compliant, will bridge with lovenox  - On coreg, rates generally controlled    Chronic kidney disease  - Nephrology consulted, appreciate recs  - Repeat BMP in AM  - Avoid nephrotoxins    Abnormal urinalysis  - Patient not complaining of urinary symptoms; culture collected  - Will defer abx at this time given no indication    Treatment plan discussed with nursing staff, case management and pharmacy.     VTE Prophylaxis:  Pharmacologic & mechanical VTE prophylaxis orders are present.    Code status is   Code Status and Medical Interventions: CPR (Attempt to Resuscitate); Full Support   Ordered at: 02/04/25 1703     Code Status (Patient has no pulse and is not breathing):    CPR (Attempt to Resuscitate)     Medical Interventions (Patient has pulse or is  breathing):    Full Support       Plan for disposition: Home 2/8/25    Barriers to discharge: Cardiology and nephrology following, renal dysfunction    Time: 35+ minutes     Signature: Electronically signed by Crystal Ordoñez MD, 02/07/25, 07:50 EST.  [facility] Celestin Hospitalist Team

## 2025-02-07 NOTE — CASE MANAGEMENT/SOCIAL WORK
Continued Stay Note  CHARISSA Celestin     Patient Name: Guanakito Styles  MRN: [id number]  Today's Date: 2/7/2025    Admit Date: 2/4/2025    Plan: Routine home.   Discharge Plan       Row Name 02/07/25 1521       Plan    Plan Routine home.    Patient/Family in Agreement with Plan yes    Plan Comments CM met with patient at bedside and delivered IMM copy. Discussed need for continued PT/INR checks for warfarin and pt previously going to Dr Saenz's office. Pt unable to switch to Eliquis d/t no prescription coverage. CM contacted Dr Saenz's office and scheduled PT/INR check for Monday 2/10 at 11:15 AM and then 1 month visit for March 10th at 11 AM. Printed appt calendar and provided to pt at bedside. Updated pharmacy and Dr Ordoñez of appts.             Megan Naegele, RN     Office Phone: [phone]  Office Cell: [phone]

## 2025-02-07 NOTE — PLAN OF CARE
Goal Outcome Evaluation:   Patient Aox4. Remains on IV bumex. Patient independent to bathroom. No complaints of pain or discomfort.

## 2025-02-07 NOTE — PLAN OF CARE
Goal Outcome Evaluation:   Patient has slept on and off throughout the night complaining of not getting a lot of sleep. Will continue with current plan of care.

## 2025-02-08 LAB
ANION GAP SERPL CALCULATED.3IONS-SCNC: 14.7 MMOL/L (ref 5–15)
BUN SERPL-MCNC: 40 MG/DL (ref 8–23)
BUN/CREAT SERPL: 23.5 (ref 7–25)
CALCIUM SPEC-SCNC: 9 MG/DL (ref 8.6–10.5)
CHLORIDE SERPL-SCNC: 96 MMOL/L (ref 98–107)
CO2 SERPL-SCNC: 26.3 MMOL/L (ref 22–29)
CREAT SERPL-MCNC: 1.7 MG/DL (ref 0.76–1.27)
DEPRECATED RDW RBC AUTO: 50.6 FL (ref 37–54)
EGFRCR SERPLBLD CKD-EPI 2021: 42.3 ML/MIN/1.73
ERYTHROCYTE [DISTWIDTH] IN BLOOD BY AUTOMATED COUNT: 14.9 % (ref 12.3–15.4)
GLUCOSE SERPL-MCNC: 125 MG/DL (ref 65–99)
HCT VFR BLD AUTO: 35.8 % (ref 37.5–51)
HGB BLD-MCNC: 11 G/DL (ref 13–17.7)
INR PPP: 2.74 (ref 2–3)
INR PPP: 2.83 (ref 2–3)
MCH RBC QN AUTO: 28.6 PG (ref 26.6–33)
MCHC RBC AUTO-ENTMCNC: 30.7 G/DL (ref 31.5–35.7)
MCV RBC AUTO: 93 FL (ref 79–97)
PLATELET # BLD AUTO: 206 10*3/MM3 (ref 140–450)
PMV BLD AUTO: 11.8 FL (ref 6–12)
POTASSIUM SERPL-SCNC: 4.1 MMOL/L (ref 3.5–5.2)
PROTHROMBIN TIME: 28.8 SECONDS (ref 19.4–28.5)
PROTHROMBIN TIME: 29.6 SECONDS (ref 19.4–28.5)
RBC # BLD AUTO: 3.85 10*6/MM3 (ref 4.14–5.8)
SODIUM SERPL-SCNC: 137 MMOL/L (ref 136–145)
WBC NRBC COR # BLD AUTO: 7.22 10*3/MM3 (ref 3.4–10.8)

## 2025-02-08 PROCEDURE — 99232 SBSQ HOSP IP/OBS MODERATE 35: CPT | Performed by: INTERNAL MEDICINE

## 2025-02-08 PROCEDURE — 85610 PROTHROMBIN TIME: CPT | Performed by: INTERNAL MEDICINE

## 2025-02-08 PROCEDURE — 80048 BASIC METABOLIC PNL TOTAL CA: CPT | Performed by: STUDENT IN AN ORGANIZED HEALTH CARE EDUCATION/TRAINING PROGRAM

## 2025-02-08 PROCEDURE — 25010000002 ONDANSETRON PER 1 MG: Performed by: INTERNAL MEDICINE

## 2025-02-08 PROCEDURE — 85027 COMPLETE CBC AUTOMATED: CPT | Performed by: STUDENT IN AN ORGANIZED HEALTH CARE EDUCATION/TRAINING PROGRAM

## 2025-02-08 RX ORDER — WARFARIN SODIUM 2 MG/1
1 TABLET ORAL
Status: COMPLETED | OUTPATIENT
Start: 2025-02-08 | End: 2025-02-08

## 2025-02-08 RX ORDER — WARFARIN SODIUM 3 MG/1
3 TABLET ORAL
Status: DISCONTINUED | OUTPATIENT
Start: 2025-02-09 | End: 2025-02-09

## 2025-02-08 RX ADMIN — ONDANSETRON 4 MG: 2 INJECTION, SOLUTION INTRAMUSCULAR; INTRAVENOUS at 21:13

## 2025-02-08 RX ADMIN — BUMETANIDE 1 MG: 1 TABLET ORAL at 08:33

## 2025-02-08 RX ADMIN — BUMETANIDE 1 MG: 1 TABLET ORAL at 17:58

## 2025-02-08 RX ADMIN — Medication 10 ML: at 08:36

## 2025-02-08 RX ADMIN — SPIRONOLACTONE 25 MG: 25 TABLET ORAL at 08:33

## 2025-02-08 RX ADMIN — WARFARIN SODIUM 1 MG: 2 TABLET ORAL at 17:58

## 2025-02-08 RX ADMIN — Medication 10 ML: at 21:11

## 2025-02-08 RX ADMIN — CARVEDILOL 3.12 MG: 3.12 TABLET, FILM COATED ORAL at 17:58

## 2025-02-08 NOTE — PROGRESS NOTES
"                                                                                                                                      Nephrology  Progress Note                                        [facility]    Patient Identification    Name: Guanakito Styles  Age: 74 y.o.  Sex: male  :  1950  MRN: [id number]      DATE OF SERVICE:  2025        Subective    Feeling better  Breathing better     Objective   Scheduled Meds:bumetanide, 1 mg, Oral, BID Diuretics  carvedilol, 3.125 mg, Oral, BID With Meals  sodium chloride, 10 mL, Intravenous, Q12H  spironolactone, 25 mg, Oral, Daily  warfarin, 3 mg, Oral, Daily          Continuous Infusions:Pharmacy to dose warfarin,         PRN Meds:  acetaminophen **OR** acetaminophen **OR** acetaminophen    aluminum-magnesium hydroxide-simethicone    senna-docusate sodium **AND** polyethylene glycol **AND** bisacodyl **AND** bisacodyl    Calcium Replacement - Follow Nurse / BPA Driven Protocol    guaifenesin    Magnesium Cardiology Dose Replacement - Follow Nurse / BPA Driven Protocol    melatonin    nitroglycerin    ondansetron ODT **OR** ondansetron    Pharmacy to dose warfarin    Phosphorus Replacement - Follow Nurse / BPA Driven Protocol    Potassium Replacement - Follow Nurse / BPA Driven Protocol    sodium chloride    sodium chloride     Exam:  BP 93/66 (BP Location: Right arm, Patient Position: Lying)   Pulse 73   Temp 98 °F (36.7 °C) (Oral)   Resp 15   Ht 177.8 cm (70\")   Wt 105 kg (231 lb 14.8 oz)   SpO2 95%   BMI 33.28 kg/m²     Intake/Output last 3 shifts:  I/O last 3 completed shifts:  In: 600 [P.O.:600]  Out: -     Intake/Output this shift:  No intake/output data recorded.    Physical exam:  General Appearance:  Alert  Head:  Normocephalic, without obvious abnormality, atraumatic  Eyes:  PERRL, conjunctiva/corneas clear     Neck:  Supple,  no adenopathy;      Lungs:  Decreased BS occasion ronchi  Heart:  Regular rate and rhythm, S1 " and S2 normal  Abdomen:  Soft, non-tender, bowel sounds active   Extremities: trace edema  Pulses: 2+ and symmetric all extremities  Skin:  No rashes or lesions       Data Review:  All labs (24hrs):   Recent Results (from the past 24 hours)   Protime-INR    Collection Time: 02/08/25 12:24 AM    Specimen: Arm, Right; Blood   Result Value Ref Range    Protime 28.8 (H) 19.4 - 28.5 Seconds    INR 2.74 2.00 - 3.00   CBC (No Diff)    Collection Time: 02/08/25 12:24 AM    Specimen: Arm, Right; Blood   Result Value Ref Range    WBC 7.22 3.40 - 10.80 10*3/mm3    RBC 3.85 (L) 4.14 - 5.80 10*6/mm3    Hemoglobin 11.0 (L) 13.0 - 17.7 g/dL    Hematocrit 35.8 (L) 37.5 - 51.0 %    MCV 93.0 79.0 - 97.0 fL    MCH 28.6 26.6 - 33.0 pg    MCHC 30.7 (L) 31.5 - 35.7 g/dL    RDW 14.9 12.3 - 15.4 %    RDW-SD 50.6 37.0 - 54.0 fl    MPV 11.8 6.0 - 12.0 fL    Platelets 206 140 - 450 10*3/mm3   Basic Metabolic Panel    Collection Time: 02/08/25 12:24 AM    Specimen: Arm, Right; Blood   Result Value Ref Range    Glucose 125 (H) 65 - 99 mg/dL    BUN 40 (H) 8 - 23 mg/dL    Creatinine 1.70 (H) 0.76 - 1.27 mg/dL    Sodium 137 136 - 145 mmol/L    Potassium 4.1 3.5 - 5.2 mmol/L    Chloride 96 (L) 98 - 107 mmol/L    CO2 26.3 22.0 - 29.0 mmol/L    Calcium 9.0 8.6 - 10.5 mg/dL    BUN/Creatinine Ratio 23.5 7.0 - 25.0    Anion Gap 14.7 5.0 - 15.0 mmol/L    eGFR 42.3 (L) >60.0 mL/min/1.73          Imaging:  US Renal Bilateral    Result Date: 2/5/2025  Impression: No hydronephrosis or acute sonographic abnormality. Electronically Signed: Lakhwinder Rawls MD  2/5/2025 3:11 PM EST  Workstation ID: [id number]    XR Chest 2 View    Result Date: 2/4/2025  Impression: Cardiomegaly and mild pulmonary vascular congestion. Electronically Signed: Andrew Lennon MD  2/4/2025 3:17 PM EST  Workstation ID: [id number]     Assessment/Plan:     HF (heart failure)         Acute kidney injury with possible underlying CKD  Volume excess  CHF  Elevated BNP  Atrial fibrillation  Anemia      Recommendations:   Gentle diuresis creatinine 1.7 from 1.8  Renal ultrasound without obstruction with medical disease  I switch diuretics to oral  If needs cardiac catheterization will need to be prepared to reduce risk of contrast on the kidneys  Avoid nephrotoxic medication  Quantify proteinuria

## 2025-02-08 NOTE — PROGRESS NOTES
"Pharmacy dosing service  Anticoagulant  Warfarin     Subjective:    Guanakito Styles is a 74 y.o.male being continued on warfarin for Atrial Fibrillation / Flutter.    INR Goal: 2 - 3  Home medication?: warfarin 3 mg PO daily  Bridge Therapy Present?:  No  Interacting Medications Evaluation (New/Present/Discontinued): Bumex (may decrease effects of warfarin), spironolactone (may decrease effects of warfarin)  Additional Contributing Factors: aeCHF, age       Assessment/Plan:    INR was subtherapeutic upon admission secondary to missed doses. Last INR check with Dr. Saenz's office was on 8/21/24. Patient was on warfarin 3 mg daily and INR was 2.2. Unknown how long patient has been without warfarin but last fill was warfarin 3 mg tablets #14 at [facility] on 12/23/24.    INR is therapeutic today after increasing to 2.74 from 2.01. Will give another small dose of warfarin 1 mg today d/t large jump in INR and plan to then resume home dosing of warfarin 3 mg daily.    Continue to monitor and adjust based on INR. Daily INR is ordered.          Date 2/4 2/5 2/6 2/7 2/8       INR 1.5 1.60 1.20 2.01 2.74       Dose 6mg 3mg 7.5mg 1mg 1 mg           Objective:  [Ht: 177.8 cm (70\"); Wt: 105 kg (231 lb 14.8 oz); BMI: Body mass index is 33.28 kg/m².]    Lab Results   Component Value Date    ALBUMIN 3.2 (L) 03/09/2023     Lab Results   Component Value Date    INR 2.74 02/08/2025    INR 2.01 02/07/2025    INR 1.20 (L) 02/06/2025    PROTIME 28.8 (H) 02/08/2025    PROTIME 22.6 02/07/2025    PROTIME 15.3 (L) 02/06/2025     Lab Results   Component Value Date    HGB 11.0 (L) 02/08/2025    HGB 11.4 (L) 02/07/2025    HGB 10.8 (L) 02/06/2025     Lab Results   Component Value Date    HCT 35.8 (L) 02/08/2025    HCT 37.7 02/07/2025    HCT 34.6 (L) 02/06/2025       Rhiannon Nowak, [facility]  02/08/25 10:39 EST               "

## 2025-02-08 NOTE — PLAN OF CARE
Goal Outcome Evaluation:                                          Patient is doing well this shift, no new complaints. Patient is A&O X4, up ad maggy. Patient was switched to oral diuretics on dayshift. Possible discharge 2/8.

## 2025-02-08 NOTE — PLAN OF CARE
Goal Outcome Evaluation:  Pt resting in room. PCU reported PVCs and tachycardia on monitor. Pt blood pressure was 86/64 this morning. Carvedilol held. Will continue to monitor.

## 2025-02-08 NOTE — PROGRESS NOTES
CARDIOLOGY PROGRESS NOTE:    Guanakito Styles  74 y.o.  male  1950  [id number]      Referring Provider: Hospitalist    Reason for follow-up: Shortness of breath and HFrEF     Patient Care Team:  Provider, No Known as PCP - Axel Weiss MD as Consulting Physician (Cardiology)    Subjective .  Patient doing well without any symptoms    Objective sitting in chair comfortably     Review of Systems   Constitutional: Negative for malaise/fatigue.   Cardiovascular:  Negative for chest pain, dyspnea on exertion, leg swelling and palpitations.   Respiratory:  Negative for cough and shortness of breath.    Gastrointestinal:  Negative for abdominal pain, nausea and vomiting.   Neurological:  Negative for dizziness, focal weakness, headaches, light-headedness and numbness.   All other systems reviewed and are negative.      Allergies: Albuterol and Penicillins    Scheduled Meds:bumetanide, 1 mg, Oral, BID Diuretics  carvedilol, 3.125 mg, Oral, BID With Meals  sodium chloride, 10 mL, Intravenous, Q12H  spironolactone, 25 mg, Oral, Daily  warfarin, 1 mg, Oral, Once  [START ON 2/9/2025] warfarin, 3 mg, Oral, Daily      Continuous Infusions:Pharmacy to dose warfarin,       PRN Meds:.  acetaminophen **OR** acetaminophen **OR** acetaminophen    aluminum-magnesium hydroxide-simethicone    senna-docusate sodium **AND** polyethylene glycol **AND** bisacodyl **AND** bisacodyl    Calcium Replacement - Follow Nurse / BPA Driven Protocol    guaifenesin    Magnesium Cardiology Dose Replacement - Follow Nurse / BPA Driven Protocol    melatonin    nitroglycerin    ondansetron ODT **OR** ondansetron    Pharmacy to dose warfarin    Phosphorus Replacement - Follow Nurse / BPA Driven Protocol    Potassium Replacement - Follow Nurse / BPA Driven Protocol    sodium chloride    sodium chloride        VITAL SIGNS  Vitals:    02/08/25 0000 02/08/25 0505 02/08/25 0744 02/08/25 1201   BP: 103/69 93/66 (!) 86/64 128/84   BP Location:  "Left arm Right arm Right arm Right arm   Patient Position: Lying Lying Lying Sitting   Pulse: 84 73 70 83   Resp: 17 15 21 19   Temp: 98 °F (36.7 °C) 98 °F (36.7 °C) 98.2 °F (36.8 °C) 97.5 °F (36.4 °C)   TempSrc: Oral Oral Oral Oral   SpO2: 96% 95% 93% 92%   Weight:  105 kg (231 lb 14.8 oz)     Height:           Flowsheet Rows      Flowsheet Row First Filed Value   Admission Height 180.3 cm (71\") Documented at 02/04/2025 1422   Admission Weight 111 kg (245 lb 6 oz) Documented at 02/04/2025 1422             TELEMETRY: Sinus rhythm    Physical Exam:  Constitutional:       Appearance: Well-developed.   Eyes:      General: No scleral icterus.     Conjunctiva/sclera: Conjunctivae normal.   HENT:      Head: Normocephalic and atraumatic.   Neck:      Vascular: No carotid bruit or JVD.   Pulmonary:      Effort: Pulmonary effort is normal.      Breath sounds: Normal breath sounds. No wheezing. No rales.   Cardiovascular:      Normal rate. Regular rhythm.   Pulses:     Intact distal pulses.   Abdominal:      General: Bowel sounds are normal.      Palpations: Abdomen is soft.   Musculoskeletal:      Cervical back: Normal range of motion and neck supple. Skin:     General: Skin is warm and dry.      Findings: No rash.   Neurological:      Mental Status: Alert.          Results Review:   I reviewed the patient's new clinical results.  Lab Results (last 24 hours)       Procedure Component Value Units Date/Time    Protime-INR [[id number]]  (Abnormal) Collected: 02/08/25 0024    Specimen: Blood from Arm, Right Updated: 02/08/25 0119     Protime 28.8 Seconds      INR 2.74    Basic Metabolic Panel [[id number]]  (Abnormal) Collected: 02/08/25 0024    Specimen: Blood from Arm, Right Updated: 02/08/25 0119     Glucose 125 mg/dL      BUN 40 mg/dL      Creatinine 1.70 mg/dL      Sodium 137 mmol/L      Potassium 4.1 mmol/L      Chloride 96 mmol/L      CO2 26.3 mmol/L      Calcium 9.0 mg/dL      BUN/Creatinine Ratio 23.5     Anion Gap 14.7 " mmol/L      eGFR 42.3 mL/min/1.73     Narrative:      GFR Categories in Chronic Kidney Disease (CKD)      GFR Category          GFR (mL/min/1.73)    Interpretation  G1                     90 or greater         Normal or high (1)  G2                      60-89                Mild decrease (1)  G3a                   45-59                Mild to moderate decrease  G3b                   30-44                Moderate to severe decrease  G4                    15-29                Severe decrease  G5                    14 or less           Kidney failure          (1)In the absence of evidence of kidney disease, neither GFR category G1 or G2 fulfill the criteria for CKD.    eGFR calculation 2021 CKD-EPI creatinine equation, which does not include race as a factor    CBC (No Diff) [[id number]]  (Abnormal) Collected: 02/08/25 0024    Specimen: Blood from Arm, Right Updated: 02/08/25 0058     WBC 7.22 10*3/mm3      RBC 3.85 10*6/mm3      Hemoglobin 11.0 g/dL      Hematocrit 35.8 %      MCV 93.0 fL      MCH 28.6 pg      MCHC 30.7 g/dL      RDW 14.9 %      RDW-SD 50.6 fl      MPV 11.8 fL      Platelets 206 10*3/mm3             Imaging Results (Last 24 Hours)       ** No results found for the last 24 hours. **            EKG      I personally viewed and interpreted the patient's EKG/Telemetry data:    ECHOCARDIOGRAM:  Results for orders placed during the hospital encounter of 02/04/25    Adult Transthoracic Echo Complete w/ Color, Spectral and Contrast if Necessary Per Protocol    Interpretation Summary    Left ventricular ejection fraction appears to be less than 20%.    Indications  Shortness of breath    Technically satisfactory study.  Mitral valve is structurally normal.  Tricuspid valve is structurally normal.  Aortic valve is structurally normal.  Pulmonic valve could not be well visualized.  Significant mitral and tricuspid regurgitation is present.  Significant biatrial enlargement.  Significant left ventricle enlargement  and severe and diffuse hypocontractility with ejection fraction of less than 20%.  Grade 3 left ventricular diastolic dysfunction is present.  Left ventricular peak systolic global longitudinal strain is -5%.  Right ventricle is enlarged with hypocontractility.  TAPSE 1.52 cm.  Atrial septum is intact.  Aorta is normal.  IVC dilatation with decreased respiratory variation suggestive of increased right atrial pressure.  No pericardial effusion or intracardiac thrombus is seen.    Impression  Significant mitral and tricuspid regurgitation is present.  Significant biatrial enlargement.  Significant left ventricle enlargement and severe and diffuse hypocontractility with ejection fraction of less than 20%.  Grade 3 left ventricular diastolic dysfunction is present.  Left ventricular peak systolic global longitudinal strain is -5%.  Right ventricle is enlarged with hypocontractility.  TAPSE 1.52 cm.       STRESS MYOVIEW:  Results for orders placed during the hospital encounter of 03/06/23    Stress Test With Myocardial Perfusion One Day    Interpretation Summary  Indications  Shortness of breath  Chest pain    This study was performed under the direct supervision eHmalatha TAVERAS.    Resting ECG  Sinus rhythm left bundle branch block    Patient exercised for 3.17 minutes using the Kristopher protocol.  The maximum heart rate achieved was 110 and maximum systolic blood pressure 100.  Patient did not have any chest discomfort ST-T wave abnormalities but had premature ventricular contractions.    Cardiolite was used as an imaging agent.    Cardiolite images significantly decreased radionuclide uptake in the inferior and posterolateral segments with partial redistribution in the resting images.    Gated SPECT images revealed left ventricle enlargement with severe and diffuse hypocontractility with ejection fraction of 10%.    Impression  ========    Limited exercise tolerance.    Significantly decreased radionuclide uptake in the  inferior and posterolateral segments with partial redistribution.    Gated SPECT images revealed significant left ventricle enlargement with severe and diffuse hypocontractility with ejection fraction of 10%.  Possible cardiomyopathy ischemic versus nonischemic.    Suggest clinical correlation and correlation with echocardiographic findings.       CARDIAC CATHETERIZATION:  Results for orders placed during the hospital encounter of 03/06/23    Cardiac Catheterization/Vascular Study    Conclusion  CARDIAC CATHETERIZATION REPORT    DATE OF PROCEDURE:  3/8/2023    INDICATION FOR PROCEDURE:  Acute systolic congestive heart failure  Cardiomyopathy  Shortness of breath  Abnormal stress Cardiolite test    PROCEDURE PERFORMED:  Right heart catheterization left heart catheterization, coronary angiography  Left ventricle angiogram was not performed due to pre-existing renal dysfunction.    @@  Moderate conscious sedation was utilized with intravenous Versed and fentanyl administered by registered nurse with continuous ECG, pulse oximetry and hemodynamic monitoring supervised by myself throughout the entire procedure.  Conscious sedation time   30 minutes    I was present with the patient for the duration of moderate sedation and supervised staff who had no other duties and monitored the patient for the entire procedure.    @@    Under usual sterile precautions and 1% Xylocaine filtration right femoral vein and femoral artery were percutaneously punctured and a 7 and 5 British vascular sheaths were respectively inserted.  [place]iana catheter was used to measure right-sided pressures pulmonary capital wedge pressure was measured.  [place]-Eliana catheter was removed and subsequently left and right coronary arteriography was performed.  Left ventricular pressures were measured with pigtail catheter.  Left ventricle angiogram was not performed due to pre-existing renal dysfunction.  Patient tolerated the procedure well.  Hemostasis was  obtained and patient was returned to the room with intact pulses.      FINDINGS:    1. HEMODYNAMICS:  Left ventricle end-diastolic pressure is normal.  Mean right atrial pressure is 8 right ventricle 42/3 pulmonary artery 47/19 mean pulmonary artery 31 and pulmonary capital wedge pressure is 19.    2. LEFT VENTRICULOGRAPHY:  Not performed due to pre-existing renal dysfunction.    3. CORONARY ANGIOGRAPHY:  Left main coronary artery is normal.  Left anterior descending artery is normal.  Circumflex coronary artery is normal.  Right coronary artery is a large and dominant vessel and is normal.    SUMMARY:  Mild pulmonary hypertension.  Left ventricle angiogram was not performed due to pre-existing renal dysfunction.  Patient has an ejection fraction of 10 to 15% (echocardiogram)  Normal epicardial coronary arteries.    RECOMMENDATIONS:  GDMT for nonischemic cardiomyopathy as tolerated.  Intensive but cautious diuresis with close observation of renal function.  Patient would benefit from LifeVest.  Modification of risk factors.  Patient would benefit from ICD if left ventricular function does not improve with GDMT.       OTHER:         Assessment & Plan     HFrEF  Patient presented with shortness of breath and has LV systolic dysfunction with an EF of 20%  Patient is currently on carvedilol and spironolactone and will add hydralazine if blood pressure allows  Patient will also have an SGLT2 inhibitor at discharge  Patient cannot take ACE inhibitors or ARB's because of renal insufficiency.  Patient also has history of medical noncompliance.    Valvular heart disease  Patient has significant mitral and tricuspid regurgitation and will continue medical therapy because of his medical noncompliance in the past.    Pulm embolism  Patient is on warfarin keep his INR around 2-2.5  Send renal insufficiency  Patient has chronic renal sufficiency and is followed by nephrologist.    I discussed the patients findings and my  recommendations with patient and nurse    Franklin Sullivan MD  02/08/25  14:04 EST

## 2025-02-08 NOTE — PROGRESS NOTES
[facility] MEDICINE SERVICE  DAILY PROGRESS NOTE    NAME: Guanakito Styles  : 1950  MRN: [id number]      LOS: 4 days     PROVIDER OF SERVICE: Vinicio Astorga MD    Chief Complaint: HF (heart failure)    Subjective:     Interval History:    Patient seen and evaluated at bedside.  On room air, not in any respiratory distress, we had a detailed conversation about his fluid and salt intake going forward    Treatment plan discussed with patient. All questions addressed.     Review of Systems:   Denies fevers, chills  Denies chest pain, +edema, improving  Denies shortness of breath, +cough (chronic)  Denies nausea, vomiting, diarrhea  Denies dysuria, hematuria    Objective:     Vital Signs  Temp:  [97.1 °F (36.2 °C)-98.2 °F (36.8 °C)] 98.2 °F (36.8 °C)  Heart Rate:  [] 70  Resp:  [14-25] 21  BP: ()/(64-83) 86/64   Body mass index is 33.28 kg/m².    Physical Exam   General: No acute distress, alert and oriented  CV: RRR, + peripheral edema, improving  Pulm: Diminished at bases but improving from prior exam, no increased work of breathing, on RA  Abd: Soft, nontender, nondistended  Skin: No rashes or lesions on exposed skin  Psych: Appropriate mood and affect  Lower extremity, mild pitting edema    Scheduled Meds   bumetanide, 1 mg, Oral, BID Diuretics  carvedilol, 3.125 mg, Oral, BID With Meals  sodium chloride, 10 mL, Intravenous, Q12H  spironolactone, 25 mg, Oral, Daily  warfarin, 3 mg, Oral, Daily       PRN Meds     acetaminophen **OR** acetaminophen **OR** acetaminophen    aluminum-magnesium hydroxide-simethicone    senna-docusate sodium **AND** polyethylene glycol **AND** bisacodyl **AND** bisacodyl    Calcium Replacement - Follow Nurse / BPA Driven Protocol    guaifenesin    Magnesium Cardiology Dose Replacement - Follow Nurse / BPA Driven Protocol    melatonin    nitroglycerin    ondansetron ODT **OR** ondansetron    Pharmacy to dose warfarin    Phosphorus Replacement - Follow Nurse / BPA  Driven Protocol    Potassium Replacement - Follow Nurse / BPA Driven Protocol    sodium chloride    sodium chloride   Infusions  Pharmacy to dose warfarin,           Diagnostic Data    Results from last 7 days   Lab Units 02/08/25  0024   WBC 10*3/mm3 7.22   HEMOGLOBIN g/dL 11.0*   HEMATOCRIT % 35.8*   PLATELETS 10*3/mm3 206   GLUCOSE mg/dL 125*   CREATININE mg/dL 1.70*   BUN mg/dL 40*   SODIUM mmol/L 137   POTASSIUM mmol/L 4.1   ANION GAP mmol/L 14.7       No radiology results for the last day    Interval results reviewed.    Assessment/Plan:     Acute on chronic heart failure with reduced ejection fraction  - Cardiology consulted, appreciate recs  - Continue diuresis; transition from IV to oral diuretics   - Resume bb, spironolactone; hold off on ACE given renal dysfunction  - Daily weights, Is/Os, low sodium diet  - Will refer to heart failure clinic      Atrial fibrillation  - Continue warfarin with appropriate INR monitoring, pharmacy to dose  - Patient has not been compliant, will bridge with lovenox  - On coreg, rates generally controlled    Chronic kidney disease  - Nephrology consulted, appreciate recs  - Repeat BMP in AM  - Avoid nephrotoxins  -Check UPCR  -Diuretics were changed to oral if he continued to diurese  well with oral Bumex would anticipate discharging patient home tomorrow    Abnormal urinalysis  - Patient not complaining of urinary symptoms; culture collected  - Will defer abx at this time given no indication      VTE Prophylaxis:  Pharmacologic & mechanical VTE prophylaxis orders are present.    Code status is   Code Status and Medical Interventions: CPR (Attempt to Resuscitate); Full Support   Ordered at: 02/04/25 1703     Code Status (Patient has no pulse and is not breathing):    CPR (Attempt to Resuscitate)     Medical Interventions (Patient has pulse or is breathing):    Full Support       Plan for disposition: Home 2/9/25    Barriers to discharge: Cardiology and nephrology following,  renal dysfunction    Time: 35+ minutes     Signature: Electronically signed by Vinicio Astorga MD, 02/08/25, 10:02 EST.  [facility] Hospitalist Team

## 2025-02-09 ENCOUNTER — READMISSION MANAGEMENT (OUTPATIENT)
Dept: CALL CENTER | Facility: HOSPITAL | Age: 75
End: 2025-02-09

## 2025-02-09 VITALS
BODY MASS INDEX: 32.32 KG/M2 | RESPIRATION RATE: 13 BRPM | HEIGHT: 70 IN | OXYGEN SATURATION: 97 % | TEMPERATURE: 97.5 F | HEART RATE: 69 BPM | SYSTOLIC BLOOD PRESSURE: 103 MMHG | DIASTOLIC BLOOD PRESSURE: 72 MMHG | WEIGHT: 225.75 LBS

## 2025-02-09 PROBLEM — I50.23 ACUTE ON CHRONIC HFREF (HEART FAILURE WITH REDUCED EJECTION FRACTION): Status: ACTIVE | Noted: 2025-02-09

## 2025-02-09 RX ORDER — CARVEDILOL 3.12 MG/1
3.12 TABLET ORAL 2 TIMES DAILY WITH MEALS
Qty: 60 TABLET | Refills: 0 | Status: SHIPPED | OUTPATIENT
Start: 2025-02-09 | End: 2025-03-11

## 2025-02-09 RX ORDER — WARFARIN SODIUM 2 MG/1
1 TABLET ORAL
Status: DISCONTINUED | OUTPATIENT
Start: 2025-02-09 | End: 2025-02-09 | Stop reason: HOSPADM

## 2025-02-09 RX ORDER — BUMETANIDE 1 MG/1
1 TABLET ORAL 2 TIMES DAILY
Qty: 60 TABLET | Refills: 0 | Status: SHIPPED | OUTPATIENT
Start: 2025-02-09 | End: 2025-03-11

## 2025-02-09 RX ORDER — WARFARIN SODIUM 3 MG/1
3 TABLET ORAL
Status: DISCONTINUED | OUTPATIENT
Start: 2025-02-10 | End: 2025-02-09 | Stop reason: HOSPADM

## 2025-02-09 RX ORDER — SPIRONOLACTONE 25 MG/1
25 TABLET ORAL DAILY
Qty: 30 TABLET | Refills: 0 | Status: SHIPPED | OUTPATIENT
Start: 2025-02-10 | End: 2025-03-12

## 2025-02-09 RX ADMIN — CARVEDILOL 3.12 MG: 3.12 TABLET, FILM COATED ORAL at 08:40

## 2025-02-09 RX ADMIN — Medication 10 ML: at 08:41

## 2025-02-09 RX ADMIN — SPIRONOLACTONE 25 MG: 25 TABLET ORAL at 08:40

## 2025-02-09 RX ADMIN — BUMETANIDE 1 MG: 1 TABLET ORAL at 08:40

## 2025-02-09 NOTE — PROGRESS NOTES
"Pharmacy dosing service  Anticoagulant  Warfarin     Subjective:    Guanakito Styles is a 74 y.o.male being continued on warfarin for Atrial Fibrillation / Flutter.    INR Goal: 2 - 3 (Per Dr. Sullivan's note, INR goal 2-2.5)  Home medication?: warfarin 3 mg PO daily  Bridge Therapy Present?:  No  Interacting Medications Evaluation (New/Present/Discontinued): Bumex (may decrease effects of warfarin), spironolactone (may decrease effects of warfarin)  Additional Contributing Factors: aeCHF, age       Assessment/Plan:    INR was subtherapeutic upon admission secondary to missed doses. Last INR check with Dr. Saenz's office was on 8/21/24. Patient was on warfarin 3 mg daily and INR was 2.2. Unknown how long patient has been without warfarin but last fill was warfarin 3 mg tablets #14 at [facility] on 12/23/24.    INR is therapeutic today. Dr. Sullivan's note states INR goal 2-2.5. Will give another small dose of warfarin 1 mg today to target lower end of INR goal and then plan to resume warfarin 3 mg daily.    Continue to monitor and adjust based on INR. Daily INR is ordered.          Date 2/4 2/5 2/6 2/7 2/8 2/9      INR 1.5 1.60 1.20 2.01 2.74 2.83      Dose 6mg 3mg 7.5mg 1mg 1 mg 1 mg          Objective:  [Ht: 177.8 cm (70\"); Wt: 102 kg (225 lb 12 oz); BMI: Body mass index is 32.39 kg/m².]    Lab Results   Component Value Date    ALBUMIN 3.2 (L) 03/09/2023     Lab Results   Component Value Date    INR 2.83 02/08/2025    INR 2.74 02/08/2025    INR 2.01 02/07/2025    PROTIME 29.6 (H) 02/08/2025    PROTIME 28.8 (H) 02/08/2025    PROTIME 22.6 02/07/2025     Lab Results   Component Value Date    HGB 11.0 (L) 02/08/2025    HGB 11.4 (L) 02/07/2025    HGB 10.8 (L) 02/06/2025     Lab Results   Component Value Date    HCT 35.8 (L) 02/08/2025    HCT 37.7 02/07/2025    HCT 34.6 (L) 02/06/2025       Rhiannon Nowak [facility]  02/09/25 09:07 EST                 "

## 2025-02-09 NOTE — DISCHARGE SUMMARY
[facility] Medicine Services  Discharge Summary    Date of Service: 2025  Patient Name: Guanakito Styles  : 1950  MRN: [id number]    Date of Admission: 2025  Discharge Diagnosis:     -Acute on chronic systolic CHF exacerbation  -Cardiomyopathy  -CHRISTINA on CKD stage III  -Mitral and tricuspid valve regurgitations  -History of pulmonary embolism on Coumadin    Date of Discharge:  2025  Primary Care Physician: Provider, No Known      Presenting Problem:   HF (heart failure) [I50.9]  Difficulty sleeping [G47.9]  Bacteria in urine [R82.71]  CHRISTINA (acute kidney injury) [N17.9]  Anemia, unspecified type [D64.9]  Acute on chronic congestive heart failure, unspecified heart failure type [I50.9]  Acute on chronic HFrEF (heart failure with reduced ejection fraction) [I50.23]    Active and Resolved Hospital Problems:  Active Hospital Problems    Diagnosis POA   • **HF (heart failure) [I50.9] Yes   • Acute on chronic HFrEF (heart failure with reduced ejection fraction) [I50.23] Yes      Resolved Hospital Problems   No resolved problems to display.         Hospital Course     Brief HPI Hospital Course:    74 y.o. old male patient with PMH of heart failure reduced ejection fraction atrial fibrillation presents to the hospital with complaints of dyspnea and bilateral lower extremity edema.  Running out of her diuretic and warfarin at home for quite some times.  BNP of 16K.  Chest x-ray with pulmonary vascular congestion.  Lasix 80 mg IV one-time given in the ED started on Bumex 1 mg IV twice daily     -Patient was diuresed very well and he was maintained in negative fluid balance over the last few days with significant improvement in his symptoms since I saw the patient yesterday he was on room air we had a very detailed along conversation about salt and fluid intake limitation to 2-2.5 g of salt in 24 hours along with fluid restriction to 1.5 to 1.8 L a day  -Repeat echocardiogram showed an EF of  20%  -Evaluated by cardiology  -Noted to have slight bump in his creatinine   -Diuretics were changed to oral, patient was also started on beta-blocker, discussed with nephrology okay to add Jardiance, ACE/ARB will be started as an outpatient patient got both follow-up appointment with nephrology and cardiology        DISCHARGE Follow Up Recommendations for labs and diagnostics:   Keep upcoming appointment with both nephrology and cardiology      Reasons For Change In Medications and Indications for New Medications:      Day of Discharge     Vital Signs:  Temp:  [97.4 °F (36.3 °C)-98 °F (36.7 °C)] 97.5 °F (36.4 °C)  Heart Rate:  [69-83] 69  Resp:  [13-21] 13  BP: (103-128)/(72-90) 103/72    Physical Exam:    General Appearance:    Alert, cooperative, in no acute distress   Head:    Normocephalic, without obvious abnormality, atraumatic   Eyes:            conjunctivae and sclerae normal, no icterus, no pallor, corneas  clear, PERRLA   Neck:   No adenopathy, supple, trachea midline, no thyromegaly, no   carotid bruit, no JVD   Lungs:     Clear to auscultation,respirations regular, even and                  unlabored    Heart:    Regular rhythm and normal rate, normal S1 and S2, no            murmur, no gallop, no rub, no click   Abdomen:     Normal bowel sounds, no masses, no organomegaly, soft        nontender, nondistended, no guarding, no rebound                No tenderness   Extremities:   Moves all extremities well, no edema, no cyanosis, no             redness   Lymph nodes:   No palpable adenopathy   Neurologic:   Cranial nerves 2 - 12 grossly intact, sensation intact, DTR       present and equal bilaterally          Pertinent  and/or Most Recent Results     LAB RESULTS:      Lab 02/08/25  2231 02/08/25  0024 02/07/25  0406 02/06/25  0237 02/05/25  0247 02/04/25  1509   WBC  --  7.22 8.52 7.68 7.61 7.68   HEMOGLOBIN  --  11.0* 11.4* 10.8* 10.4* 10.8*   HEMATOCRIT  --  35.8* 37.7 34.6* 33.2* 34.2*   PLATELETS   --  206 235 195 180 209   NEUTROS ABS  --   --  6.28 5.47 5.36 5.93   IMMATURE GRANS (ABS)  --   --  0.03 0.02 0.02 0.04   LYMPHS ABS  --   --  1.20 1.18 1.21 0.79   MONOS ABS  --   --  0.91* 0.93* 0.94* 0.88   EOS ABS  --   --  0.03 0.03 0.02 0.01   MCV  --  93.0 94.0 93.8 92.7 93.2   PROTIME 29.6* 28.8* 22.6 15.3* 19.0* 18.1*   APTT  --   --   --   --   --  31.6         Lab 02/08/25  0024 02/07/25 0406 02/06/25 0237 02/05/25 0247 02/04/25  1509   SODIUM 137 138 137 139 139   POTASSIUM 4.1 4.1 3.6 3.8 4.0   CHLORIDE 96* 98 99 103 104   CO2 26.3 24.7 25.2 22.4 19.6*   ANION GAP 14.7 15.3* 12.8 13.6 15.4*   BUN 40* 38* 34* 29* 23   CREATININE 1.70* 1.73* 1.81* 1.77* 1.52*   EGFR 42.3* 41.4* 39.2* 40.3* 48.4*   GLUCOSE 125* 132* 136* 130* 125*   CALCIUM 9.0 9.4 9.0 8.9 9.0   MAGNESIUM  --  2.2 2.1 2.1  --    PHOSPHORUS  --  4.5 3.9 4.3  --    TSH  --   --  4.310*  --   --              Lab 02/08/25 2231 02/08/25 0024 02/07/25 0406 02/06/25  0237 02/05/25 0247 02/04/25  1832 02/04/25  1509   PROBNP  --   --   --   --   --   --  16,707.0*   HSTROP T  --   --   --   --   --  28* 24*   PROTIME 29.6* 28.8* 22.6 15.3* 19.0*  --  18.1*   INR 2.83 2.74 2.01 1.20* 1.60*  --  1.50*                 Brief Urine Lab Results  (Last result in the past 365 days)        Color   Clarity   Blood   Leuk Est   Nitrite   Protein   CREAT   Urine HCG        02/05/25 1617             163.8               Microbiology Results (last 10 days)       Procedure Component Value - Date/Time    Respiratory Panel PCR w/COVID-19(SARS-CoV-2) BOOGIE/BIANCA/JANIA/PAD/COR/ROBERT In-House, NP Swab in UTM/VTM, 2 HR TAT - Swab, Nasopharynx [[id number]]  (Normal) Collected: 02/05/25 1131    Lab Status: Final result Specimen: Swab from Nasopharynx Updated: 02/05/25 1238     ADENOVIRUS, PCR Not Detected     Coronavirus 229E Not Detected     Coronavirus HKU1 Not Detected     Coronavirus NL63 Not Detected     Coronavirus OC43 Not Detected     COVID19 Not Detected      Human Metapneumovirus Not Detected     Human Rhinovirus/Enterovirus Not Detected     Influenza A PCR Not Detected     Influenza B PCR Not Detected     Parainfluenza Virus 1 Not Detected     Parainfluenza Virus 2 Not Detected     Parainfluenza Virus 3 Not Detected     Parainfluenza Virus 4 Not Detected     RSV, PCR Not Detected     Bordetella pertussis pcr Not Detected     Bordetella parapertussis PCR Not Detected     Chlamydophila pneumoniae PCR Not Detected     Mycoplasma pneumo by PCR Not Detected    Narrative:      In the setting of a positive respiratory panel with a viral infection PLUS a negative procalcitonin without other underlying concern for bacterial infection, consider observing off antibiotics or discontinuation of antibiotics and continue supportive care. If the respiratory panel is positive for atypical bacterial infection (Bordetella pertussis, Chlamydophila pneumoniae, or Mycoplasma pneumoniae), consider antibiotic de-escalation to target atypical bacterial infection.    COVID-19, FLU A/B, RSV PCR 1 HR TAT - Swab, Nasopharynx [[id number]]  (Normal) Collected: 02/04/25 1521    Lab Status: Final result Specimen: Swab from Nasopharynx Updated: 02/04/25 1608     COVID19 Not Detected     Influenza A PCR Not Detected     Influenza B PCR Not Detected     RSV, PCR Not Detected    Narrative:      Fact sheet for providers: https://www.fda.gov/media/912790/download    Fact sheet for patients: https://www.fda.gov/media/099772/download    Test performed by PCR.    Urine Culture - Urine, Urine, Clean Catch [[id number]]  (Normal) Collected: 02/04/25 1519    Lab Status: Final result Specimen: Urine, Clean Catch Updated: 02/05/25 1409     Urine Culture No growth            US Renal Bilateral    Result Date: 2/5/2025  Impression: Impression: No hydronephrosis or acute sonographic abnormality. Electronically Signed: Lakhwinder Rawls MD  2/5/2025 3:11 PM EST  Workstation ID: [id number]    XR Chest 2 View    Result Date:  2/4/2025  Impression: Impression: Cardiomegaly and mild pulmonary vascular congestion. Electronically Signed: Andrew Lennon MD  2/4/2025 3:17 PM EST  Workstation ID: [id number]     Results for orders placed during the hospital encounter of 03/06/23    Duplex Venous Lower Extremity - Bilateral CAR    Interpretation Summary  •  Chronic right lower extremity superficial thrombophlebitis noted in the great saphenous (above knee) and great saphenous (below knee).  •  Acute left lower extremity deep vein thrombosis noted in the gastrocnemius.  •  Acute left lower extremity superficial thrombophlebitis noted in the great saphenous (above knee), great saphenous (below knee), small saphenous, varicosity (above knee) and varicosity (below knee).  •  All other veins appeared normal bilaterally.      Results for orders placed during the hospital encounter of 03/06/23    Duplex Venous Lower Extremity - Bilateral CAR    Interpretation Summary  •  Chronic right lower extremity superficial thrombophlebitis noted in the great saphenous (above knee) and great saphenous (below knee).  •  Acute left lower extremity deep vein thrombosis noted in the gastrocnemius.  •  Acute left lower extremity superficial thrombophlebitis noted in the great saphenous (above knee), great saphenous (below knee), small saphenous, varicosity (above knee) and varicosity (below knee).  •  All other veins appeared normal bilaterally.      Results for orders placed during the hospital encounter of 02/04/25    Adult Transthoracic Echo Complete w/ Color, Spectral and Contrast if Necessary Per Protocol    Interpretation Summary  •  Left ventricular ejection fraction appears to be less than 20%.    Indications  Shortness of breath    Technically satisfactory study.  Mitral valve is structurally normal.  Tricuspid valve is structurally normal.  Aortic valve is structurally normal.  Pulmonic valve could not be well visualized.  Significant mitral and tricuspid  regurgitation is present.  Significant biatrial enlargement.  Significant left ventricle enlargement and severe and diffuse hypocontractility with ejection fraction of less than 20%.  Grade 3 left ventricular diastolic dysfunction is present.  Left ventricular peak systolic global longitudinal strain is -5%.  Right ventricle is enlarged with hypocontractility.  TAPSE 1.52 cm.  Atrial septum is intact.  Aorta is normal.  IVC dilatation with decreased respiratory variation suggestive of increased right atrial pressure.  No pericardial effusion or intracardiac thrombus is seen.    Impression  Significant mitral and tricuspid regurgitation is present.  Significant biatrial enlargement.  Significant left ventricle enlargement and severe and diffuse hypocontractility with ejection fraction of less than 20%.  Grade 3 left ventricular diastolic dysfunction is present.  Left ventricular peak systolic global longitudinal strain is -5%.  Right ventricle is enlarged with hypocontractility.  TAPSE 1.52 cm.      Labs Pending at Discharge:      Procedures Performed           Consults:   Consults       Date and Time Order Name Status Description    2/5/2025  8:10 AM Inpatient Nephrology Consult      2/4/2025  5:03 PM Inpatient Cardiology Consult      2/4/2025  4:09 PM Hospitalist (on-call MD unless specified)                Discharge Details        Discharge Medications        New Medications        Instructions Start Date   bumetanide 1 MG tablet  Commonly known as: BUMEX   1 mg, Oral, 2 Times Daily      carvedilol 3.125 MG tablet  Commonly known as: COREG   3.125 mg, Oral, 2 Times Daily With Meals      empagliflozin 10 MG tablet tablet  Commonly known as: Jardiance   10 mg, Oral, Daily      spironolactone 25 MG tablet  Commonly known as: ALDACTONE   25 mg, Oral, Daily   Start Date: February 10, 2025            Continue These Medications        Instructions Start Date   warfarin 3 MG tablet  Commonly known as: COUMADIN   TAKE 1  TABLET BY MOUTH DAILY               Allergies   Allergen Reactions   • Albuterol Other (See Comments)     Pt reports that breathing is worse with use   • Penicillins Unknown - Low Severity         Discharge Disposition:   Home or Self Care    Diet:  Hospital:  Diet Order   Procedures   • Diet: Cardiac, Diabetic; Low Sodium (2g); Consistent Carbohydrate; Fluid Consistency: Thin (IDDSI 0)         Discharge Activity:         CODE STATUS:  Code Status and Medical Interventions: CPR (Attempt to Resuscitate); Full Support   Ordered at: 02/04/25 4640     Code Status (Patient has no pulse and is not breathing):    CPR (Attempt to Resuscitate)     Medical Interventions (Patient has pulse or is breathing):    Full Support         Future Appointments   Date Time Provider Department Center   2/10/2025 11:15 AM [facility]   2/12/2025 10:00 AM Gurpreet Rojas APRN [facility] None   3/10/2025 10:45 AM [facility]   3/10/2025 11:00 AM Chika Henao APRN [facility] NA           Time spent on Discharge including face to face service:  >30 minutes    Signature: Electronically signed by Vinicio Astorga MD, 02/09/25, 10:09 EST.  [facility] Hospitalist Team

## 2025-02-09 NOTE — PLAN OF CARE
Goal Outcome Evaluation:  Pt discharging. Belongings collected IV removed. Will educate pt and get them ready for discharge.

## 2025-02-09 NOTE — PROGRESS NOTES
"                                                                                                                                      Nephrology  Progress Note                                        [facility]    Patient Identification    Name: Guanakito Styles  Age: 74 y.o.  Sex: male  :  1950  MRN: [id number]      DATE OF SERVICE:  2025        Subective    Feeling better  Breathing better     Objective   Scheduled Meds:bumetanide, 1 mg, Oral, BID Diuretics  carvedilol, 3.125 mg, Oral, BID With Meals  sodium chloride, 10 mL, Intravenous, Q12H  spironolactone, 25 mg, Oral, Daily  warfarin, 3 mg, Oral, Daily          Continuous Infusions:Pharmacy to dose warfarin,         PRN Meds:  acetaminophen **OR** acetaminophen **OR** acetaminophen    aluminum-magnesium hydroxide-simethicone    senna-docusate sodium **AND** polyethylene glycol **AND** bisacodyl **AND** bisacodyl    Calcium Replacement - Follow Nurse / BPA Driven Protocol    guaifenesin    Magnesium Cardiology Dose Replacement - Follow Nurse / BPA Driven Protocol    melatonin    nitroglycerin    ondansetron ODT **OR** ondansetron    Pharmacy to dose warfarin    Phosphorus Replacement - Follow Nurse / BPA Driven Protocol    Potassium Replacement - Follow Nurse / BPA Driven Protocol    sodium chloride    sodium chloride     Exam:  /72 (BP Location: Left arm, Patient Position: Lying)   Pulse 69   Temp 97.7 °F (36.5 °C) (Oral)   Resp 20   Ht 177.8 cm (70\")   Wt 102 kg (225 lb 12 oz)   SpO2 95%   BMI 32.39 kg/m²     Intake/Output last 3 shifts:  I/O last 3 completed shifts:  In: 360 [P.O.:360]  Out: -     Intake/Output this shift:  No intake/output data recorded.    Physical exam:  General Appearance:  Alert  Head:  Normocephalic, without obvious abnormality, atraumatic  Eyes:  PERRL, conjunctiva/corneas clear     Neck:  Supple,  no adenopathy;      Lungs:  Decreased BS occasion ronchi  Heart:  Regular rate and rhythm, S1 " and S2 normal  Abdomen:  Soft, non-tender, bowel sounds active   Extremities: trace edema  Pulses: 2+ and symmetric all extremities  Skin:  No rashes or lesions       Data Review:  All labs (24hrs):   Recent Results (from the past 24 hours)   Protime-INR    Collection Time: 02/08/25 10:31 PM    Specimen: Hand, Right; Blood   Result Value Ref Range    Protime 29.6 (H) 19.4 - 28.5 Seconds    INR 2.83 2.00 - 3.00          Imaging:  US Renal Bilateral    Result Date: 2/5/2025  Impression: No hydronephrosis or acute sonographic abnormality. Electronically Signed: Lakhwinder Rawls MD  2/5/2025 3:11 PM EST  Workstation ID: [id number]    XR Chest 2 View    Result Date: 2/4/2025  Impression: Cardiomegaly and mild pulmonary vascular congestion. Electronically Signed: Andrew Lennon MD  2/4/2025 3:17 PM EST  Workstation ID: [id number]     Assessment/Plan:     HF (heart failure)         Acute kidney injury with possible underlying CKD  Volume excess  CHF  Elevated BNP  Atrial fibrillation  Anemia     Recommendations:   Renal wise stable  Gentle diuresis creatinine 1.7 from 1.8  Renal ultrasound without obstruction with medical disease  I switch diuretics to oral  If needs cardiac catheterization will need to be prepared to reduce risk of contrast on the kidneys  Avoid nephrotoxic medication  Quantify proteinuria

## 2025-02-10 ENCOUNTER — ANTICOAGULATION VISIT (OUTPATIENT)
Dept: CARDIOLOGY | Facility: CLINIC | Age: 75
End: 2025-02-10

## 2025-02-10 VITALS
SYSTOLIC BLOOD PRESSURE: 117 MMHG | DIASTOLIC BLOOD PRESSURE: 72 MMHG | HEART RATE: 79 BPM | BODY MASS INDEX: 33.15 KG/M2 | WEIGHT: 231 LBS

## 2025-02-10 DIAGNOSIS — I48.91 ATRIAL FIBRILLATION, UNSPECIFIED TYPE: ICD-10-CM

## 2025-02-10 DIAGNOSIS — Z79.01 LONG TERM (CURRENT) USE OF ANTICOAGULANTS: Primary | ICD-10-CM

## 2025-02-10 LAB — INR PPP: 2.3 (ref 0.9–1.1)

## 2025-02-10 PROCEDURE — 85610 PROTHROMBIN TIME: CPT | Performed by: INTERNAL MEDICINE

## 2025-02-10 PROCEDURE — 36416 COLLJ CAPILLARY BLOOD SPEC: CPT | Performed by: INTERNAL MEDICINE

## 2025-02-10 NOTE — OUTREACH NOTE
Prep Survey      Flowsheet Row Responses   [facility] facility patient discharged from? [facility]   Is LACE score < 7 ? No   Eligibility Readm Mgmt   Discharge diagnosis A/C CHF   Does the patient have one of the following disease processes/diagnoses(primary or secondary)? CHF   Does the patient have Home health ordered? No   Is there a DME ordered? No   Prep survey completed? Yes            Mirian VENTURA - Registered Nurse

## 2025-02-10 NOTE — CASE MANAGEMENT/SOCIAL WORK
Case Management Discharge Note      Final Note: Routine home.      Selected Continued Care - Discharged on 2/9/2025 Admission date: 2/4/2025 - Discharge disposition: Home or Self Care       Transportation Services  Private: Car    Final Discharge Disposition Code: 01 - home or self-care

## 2025-02-11 PROBLEM — N18.32 STAGE 3B CHRONIC KIDNEY DISEASE: Chronic | Status: ACTIVE | Noted: 2025-02-11

## 2025-02-11 PROBLEM — I50.23 ACUTE ON CHRONIC HFREF (HEART FAILURE WITH REDUCED EJECTION FRACTION): Status: RESOLVED | Noted: 2025-02-09 | Resolved: 2025-02-11

## 2025-02-11 PROBLEM — I50.42 CHRONIC COMBINED SYSTOLIC AND DIASTOLIC HEART FAILURE: Chronic | Status: ACTIVE | Noted: 2025-02-11

## 2025-02-11 PROBLEM — Z79.01 PULMONARY EMBOLISM ON LONG-TERM ANTICOAGULATION THERAPY: Chronic | Status: ACTIVE | Noted: 2025-02-11

## 2025-02-11 PROBLEM — I50.9 CONGESTIVE HEART FAILURE: Status: RESOLVED | Noted: 2023-03-06 | Resolved: 2025-02-11

## 2025-02-11 PROBLEM — I38 VALVULAR HEART DISEASE: Chronic | Status: ACTIVE | Noted: 2025-02-11

## 2025-02-11 PROBLEM — Z91.89 AT HIGH RISK FOR EXCESS FLUID VOLUME: Status: ACTIVE | Noted: 2025-02-11

## 2025-02-11 PROBLEM — I27.20 PULMONARY HYPERTENSION: Chronic | Status: ACTIVE | Noted: 2025-02-11

## 2025-02-11 PROBLEM — R06.09 DOE (DYSPNEA ON EXERTION): Status: ACTIVE | Noted: 2025-02-11

## 2025-02-11 PROBLEM — I42.0 CARDIOMYOPATHY, DILATED, NONISCHEMIC: Chronic | Status: ACTIVE | Noted: 2025-02-11

## 2025-02-11 PROBLEM — Z91.199 POOR COMPLIANCE: Status: ACTIVE | Noted: 2025-02-11

## 2025-02-11 PROBLEM — I50.21 ACUTE HFREF (HEART FAILURE WITH REDUCED EJECTION FRACTION): Status: RESOLVED | Noted: 2023-03-09 | Resolved: 2025-02-11

## 2025-02-11 PROBLEM — I26.99 PULMONARY EMBOLISM ON LONG-TERM ANTICOAGULATION THERAPY: Chronic | Status: ACTIVE | Noted: 2025-02-11

## 2025-02-11 NOTE — PROGRESS NOTES
"Cardiology Heart Failure Clinic Note  Gurpreet \"Tommy\" PITER Garza    Patient ID: Guanakito Styles  is a 74 y.o. male.    Encounter Date:02/12/2025    HPI:  74-year-old male patient of Dr. Sears with a significant past medical history including being diagnosed apparently with nonischemic dilated cardiomyopathy couple years ago he has both chronic combined systolic and diastolic heart failure (HFrEF, his LVIDD is very large at 7 cm, (TTE 2/5/2025) LVEF less than 20% with grade 3 diastolic dysfunction, previously was supposed to have a LifeVest which he has been noncompliant with for well over a year now, additionally known to have valvular heart disease with moderate MR and TR, as well as moderate to severe pulmonary hypertension with his RVSP around 49 mmHg, he also suffers from stage III chronic kidney disease nearing stage IV, history of atrial fibrillation anticoagulated with Coumadin and rate controlled with Coreg, history of January 2024 having a pulmonary embolism, elevated blood glucose, recently hospitalized on February 4 complaining of dyspnea and bilateral lower extremity edema and ran out of diuretic and anticoagulant at home for quite some time proBNP was at 16,000 chest x-ray of course showed pulmonary vascular congestion repeat TTE showed an EF of about 20% evaluated by nephrology since his CKD had worsened who was agreeable with the addition of Jardiance and ACE or ARB to be started as an outpatient.  Comes in for his initial heart failure visit on 12 February 2025 since recently hospitalized       Assessment:    Diagnoses and all orders for this visit:    1. Cardiomyopathy, dilated, nonischemic (Primary)  Overview:  A.  LVIDD 7 cm  B.  Chronic combined systolic and diastolic heart failure (HFrEF)  C.  (TTE 2/5/25) LVEF <20% with grade 3 diastolic dysfunction  D.  LifeVest recommended March 23 no devices currently    Orders:  -     proBNP; Future    2. Chronic combined systolic and diastolic heart " failure (HFrEF)  Overview:  A.  (TTE 2/5/25) LVEF <20% with grade 3 diastolic dysfunction   I. (TTE 3/7/23) LVEF-10 to 15%   B.  LifeVest recommended March 2023           I.   no devices currently    Orders:  -     proBNP; Future    3. Moderate to severe pulmonary hypertension  Overview:  RVSP 49 mmHg    Orders:  -     proBNP; Future    4. Valvular heart disease  Overview:  A moderate to severe MR  B.  Moderate severe TR    Orders:  -     proBNP; Future    5. Stage 3b chronic kidney disease  Overview:  Followed by -Dr. Gramajo.     Orders:  -     CBC & Differential; Future  -     Hepatic Function Panel; Future    6. At high risk for excess fluid volume  Overview:  A.  HFrEF  B.  Pulmonary hypertension  C.  Moderate to severe valvular heart disease  D.  Stage IIIb CKD      7. Atrial fibrillation, unspecified type  Overview:  A.  Rate controlled with carvedilol  B.  Anticoagulated with warfarin sodium  C.  No history of DCCV and/or ablation identified in epic    Orders:  -     TSH; Future  -     Magnesium; Future  -     ECG 12 Lead; Future    8. h/o Jan 24 Pulmonary embolism on long-term anticoagulation therapy  -     Protime-INR; Future    9. QUINONES (dyspnea on exertion)  -     proBNP; Future  -     Basic Metabolic Panel; Future  -     TSH; Future  -     Hemoglobin A1c; Future  -     Magnesium; Future  -     Protime-INR; Future  -     ECG 12 Lead; Future  -     CBC & Differential; Future  -     Hepatic Function Panel; Future  -     Lipid Panel; Future    10. Elevated glucose  -     Hemoglobin A1c; Future    11. Poor compliance              Plan/discussion    Volume overload    Heart Failure Core Measures addressed    Type of Overload  multifactorial    HFrEF  . Pulmonary hypertension   Moderate to severe valvular heart disease    Stage IIIb CKD    Most recent EF:   (TTE 2/5/25) LVEF <20% with grade 3 diastolic dysfunction                                       I. (TTE 3/7/23) LVEF-10 to 15%     [employer] [place] [employer]  Class & Stage : II b    HF Meds Pre Visit    Beta Blocker: Coreg 3.125 mg twice daily  ARNI/ACE/ARB: None  SGLT 2 inhibitors: Jardiance 10 mg daily  Diuretics: Bumex 1 mg twice daily  Aldosterone Antagonist: Spironolactone 25 mg daily  Digitalis: N/A  Vasodilators & Nitrates: N/A    HF Meds Post Visit    Beta Blocker: ***  ARNI/ACE/ARB: ***  SGLT 2 inhibitors: ***  Diuretics: ***  Furoscix: ***  Aldosterone Antagonist: ***  Digitalis: ***  Nitrates: ***          Cardiac medicines reviewed with risk, benefits, and necessity of each discussed.       _________________________________________________________    Discussion    With known chronic combined systolic diastolic heart failure with a significant diastolic grade 3 component the patient is already on heart failure core measures including beta-blocker, SGLT2, diuretic,  Aldactone   1 seems to be missing and GDMT would be a ACE ARNI ARB and/or consideration of a nitrate  Given the severity of her MR and potentially her TR some consideration could be made for notification to the valvular heart clinic for possible mitral clipping versus tricuspid clipping however will defer to Dr. Saenz for the actual consult given he is his primary cardiologist  Might be beneficial to see a pulmonologist regarding her pulmonary hypertension with an RVSP of 49 mmHg  Needs to continue to follow-up with Dr. Gramajo his primary nephrologist  Would add typical heart failure nursing interventions including:        A. Fluid restriction at less than 2000 cc daily       B. Daily weights        C.  1 g low-sodium diet        I did the following activities:preparing for the visit, with Guanakito Styles  including reviewing tests, once pt arrived in clinic I also performed a medically appropriate examination and/or evaluation , I personally spent considerable time counseling and educating the patient/family/caregiver, ordering medications, tests, or procedures, referring and communicating with other  health care professionals , and documenting information in the medical record. I estimate including preparation *** minutes             Subjective:   No chief complaint on file.      ROS:  Constitutional: + weakness, + fatigue, No fever, rigors, chills   Eyes: No recent vision changes, eye pain   ENT/oropharynx: No recent difficulty swallowing, sore throat, epistaxis, changes in hearing   Cardiovascular: No recent chest pain, chest tightness, palpitations except as noted in HPI, paroxysmal nocturnal dyspnea, orthopnea, diaphoresis, dizziness & no pre or mark syncopal episodes   Respiratory: *** + shortness of breath, + dyspnea on exertion, no significant productive cough, no wheezing and no hemoptysis   Gastrointestinal: No abdominal pain, nausea, vomiting, diarrhea, bloody stools   Genitourinary: No hematuria, dysuria other than increased frequency   Neurological: No headache, tremors, numbness,  or hemiparesis    Musculoskeletal: No change in typical cramps, myalgias, no new joint pain, joint swelling   Integument: No recent rash, + edema      Patient Active Problem List   Diagnosis    Dyspnea, unspecified type    Acute pulmonary embolism    Pulmonary vascular congestion    Elevated glucose    Elevated serum creatinine    Cardiomegaly    Elevated troponin    Long term (current) use of anticoagulants    Atrial fibrillation    Moderate to severe pulmonary hypertension    Valvular heart disease    Cardiomyopathy, dilated, nonischemic    Chronic combined systolic and diastolic heart failure (HFrEF)    Stage 3b chronic kidney disease    At high risk for excess fluid volume    h/o Jan 24 Pulmonary embolism on long-term anticoagulation therapy    Poor compliance    QUINONES (dyspnea on exertion)       No past medical history on file.    Past Surgical History:   Procedure Laterality Date    CARDIAC CATHETERIZATION N/A 3/8/2023    Procedure: Left Heart Cath and coronary angiogram;  Surgeon: Axel Saenz MD;  Location:   [facility];  Service: Cardiovascular;  Laterality: N/A;    CARDIAC CATHETERIZATION  3/8/2023    Procedure: Right and Left Heart Cath;  Surgeon: Axel Saenz MD;  Location: [facility] CATH INVASIVE LOCATION;  Service: Cardiovascular;;       Social History     Socioeconomic History    Marital status:    Tobacco Use    Smoking status: Former     Types: Cigarettes     Passive exposure: Past    Smokeless tobacco: Never   Vaping Use    Vaping status: Never Used   Substance and Sexual Activity    Alcohol use: Yes    Drug use: Never    Sexual activity: Defer       Allergies   Allergen Reactions    Albuterol Other (See Comments)     Pt reports that breathing is worse with use    Penicillins Unknown - Low Severity         Current Outpatient Medications:     bumetanide (BUMEX) 1 MG tablet, Take 1 tablet by mouth 2 (Two) Times a Day for 30 days., Disp: 60 tablet, Rfl: 0    carvedilol (COREG) 3.125 MG tablet, Take 1 tablet by mouth 2 (Two) Times a Day With Meals for 30 days., Disp: 60 tablet, Rfl: 0    empagliflozin (Jardiance) 10 MG tablet tablet, Take 1 tablet by mouth Daily for 30 days., Disp: 30 tablet, Rfl: 0    spironolactone (ALDACTONE) 25 MG tablet, Take 1 tablet by mouth Daily for 30 days., Disp: 30 tablet, Rfl: 0    warfarin (COUMADIN) 3 MG tablet, TAKE 1 TABLET BY MOUTH DAILY, Disp: 14 tablet, Rfl: 0      There is no immunization history on file for this patient.    Most recent EKG as reviewed:  Procedures     Most recent echo as reviewed:  Results for orders placed during the hospital encounter of 02/04/25    Adult Transthoracic Echo Complete w/ Color, Spectral and Contrast if Necessary Per Protocol    Interpretation Summary    Left ventricular ejection fraction appears to be less than 20%.    Indications  Shortness of breath    Technically satisfactory study.  Mitral valve is structurally normal.  Tricuspid valve is structurally normal.  Aortic valve is structurally normal.  Pulmonic valve  could not be well visualized.  Significant mitral and tricuspid regurgitation is present.  Significant biatrial enlargement.  Significant left ventricle enlargement and severe and diffuse hypocontractility with ejection fraction of less than 20%.  Grade 3 left ventricular diastolic dysfunction is present.  Left ventricular peak systolic global longitudinal strain is -5%.  Right ventricle is enlarged with hypocontractility.  TAPSE 1.52 cm.  Atrial septum is intact.  Aorta is normal.  IVC dilatation with decreased respiratory variation suggestive of increased right atrial pressure.  No pericardial effusion or intracardiac thrombus is seen.    Impression  Significant mitral and tricuspid regurgitation is present.  Significant biatrial enlargement.  Significant left ventricle enlargement and severe and diffuse hypocontractility with ejection fraction of less than 20%.  Grade 3 left ventricular diastolic dysfunction is present.  Left ventricular peak systolic global longitudinal strain is -5%.  Right ventricle is enlarged with hypocontractility.  TAPSE 1.52 cm.      Most recent stress test results:  Results for orders placed during the hospital encounter of 03/06/23    Stress Test With Myocardial Perfusion One Day    Interpretation Summary  Indications  Shortness of breath  Chest pain    This study was performed under the direct supervision Hemalatha TAVERAS.    Resting ECG  Sinus rhythm left bundle branch block    Patient exercised for 3.17 minutes using the Krisotpher protocol.  The maximum heart rate achieved was 110 and maximum systolic blood pressure 100.  Patient did not have any chest discomfort ST-T wave abnormalities but had premature ventricular contractions.    Cardiolite was used as an imaging agent.    Cardiolite images significantly decreased radionuclide uptake in the inferior and posterolateral segments with partial redistribution in the resting images.    Gated SPECT images revealed left ventricle enlargement with  severe and diffuse hypocontractility with ejection fraction of 10%.    Impression  ========    Limited exercise tolerance.    Significantly decreased radionuclide uptake in the inferior and posterolateral segments with partial redistribution.    Gated SPECT images revealed significant left ventricle enlargement with severe and diffuse hypocontractility with ejection fraction of 10%.  Possible cardiomyopathy ischemic versus nonischemic.    Suggest clinical correlation and correlation with echocardiographic findings.      Most recent cardiac catheterization results:  Results for orders placed during the hospital encounter of 03/06/23    Cardiac Catheterization/Vascular Study    Conclusion  CARDIAC CATHETERIZATION REPORT    DATE OF PROCEDURE:  3/8/2023    INDICATION FOR PROCEDURE:  Acute systolic congestive heart failure  Cardiomyopathy  Shortness of breath  Abnormal stress Cardiolite test    PROCEDURE PERFORMED:  Right heart catheterization left heart catheterization, coronary angiography  Left ventricle angiogram was not performed due to pre-existing renal dysfunction.    @@  Moderate conscious sedation was utilized with intravenous Versed and fentanyl administered by registered nurse with continuous ECG, pulse oximetry and hemodynamic monitoring supervised by myself throughout the entire procedure.  Conscious sedation time   30 minutes    I was present with the patient for the duration of moderate sedation and supervised staff who had no other duties and monitored the patient for the entire procedure.    @@    Under usual sterile precautions and 1% Xylocaine filtration right femoral vein and femoral artery were percutaneously punctured and a 7 and 5 Romanian vascular sheaths were respectively inserted.  Olalla-Eliana catheter was used to measure right-sided pressures pulmonary capital wedge pressure was measured.  Olalla-Eliana catheter was removed and subsequently left and right coronary arteriography was performed.  Left  ventricular pressures were measured with pigtail catheter.  Left ventricle angiogram was not performed due to pre-existing renal dysfunction.  Patient tolerated the procedure well.  Hemostasis was obtained and patient was returned to the room with intact pulses.      FINDINGS:    1. HEMODYNAMICS:  Left ventricle end-diastolic pressure is normal.  Mean right atrial pressure is 8 right ventricle 42/3 pulmonary artery 47/19 mean pulmonary artery 31 and pulmonary capital wedge pressure is 19.    2. LEFT VENTRICULOGRAPHY:  Not performed due to pre-existing renal dysfunction.    3. CORONARY ANGIOGRAPHY:  Left main coronary artery is normal.  Left anterior descending artery is normal.  Circumflex coronary artery is normal.  Right coronary artery is a large and dominant vessel and is normal.    SUMMARY:  Mild pulmonary hypertension.  Left ventricle angiogram was not performed due to pre-existing renal dysfunction.  Patient has an ejection fraction of 10 to 15% (echocardiogram)  Normal epicardial coronary arteries.    RECOMMENDATIONS:  GDMT for nonischemic cardiomyopathy as tolerated.  Intensive but cautious diuresis with close observation of renal function.  Patient would benefit from LifeVest.  Modification of risk factors.  Patient would benefit from ICD if left ventricular function does not improve with GDMT.        Imaging:    Results for orders placed during the hospital encounter of 02/04/25    XR Chest 2 View    Narrative  XR CHEST 2 VW    Date of Exam: 2/4/2025 2:58 PM EST    Indication: soa    Comparison: 3/6/2023    Findings:  Cardiomegaly. Mediastinal contours within normal limits. Mild pulmonary vascular congestion. Query mild edema.    Impression  Impression:  Cardiomegaly and mild pulmonary vascular congestion.        Electronically Signed: Andrew Lennon MD  2/4/2025 3:17 PM EST  Workstation ID: [id number]       Results for orders placed during the hospital encounter of 02/04/25    US Renal  Bilateral    Narrative  US RENAL BILATERAL    Date of Exam: 2/5/2025 2:58 PM EST    Indication: nato.    Comparison: No comparisons available.    Technique: Grayscale and color Doppler ultrasound evaluation of the kidneys and urinary bladder was performed.      Findings:  The right kidney measures 11.7 x 4.9 x 5.9 cm. The left kidney measures 12 x 6.5 x 5.8 cm. Corticomedullary transition maintained. No hydronephrosis. No debris in the bladder.    Impression  Impression:  No hydronephrosis or acute sonographic abnormality.            Electronically Signed: Lakhwinder Rawls MD  2/5/2025 3:11 PM EST  Workstation ID: [id number]      Results for orders placed during the hospital encounter of 03/06/23    CT Angiogram Chest Pulmonary Embolism    Narrative  CT ANGIOGRAM CHEST WITH IV CONTRAST    INDICATION: Cough    COMPARISON: None available.    PROCEDURE: Multiple axial CT images were obtained of the chest after IV administration of 100 mL of Isovue-370..  Coronal and sagittal reformations as well as MIP images were obtained.  CT dose lowering techniques were used, to include: automated  exposure control, adjustment for patient size, and or use of iterative reconstruction.    FINDINGS:    Cardiovascular: There is a solitary miniscule pulmonary embolus in a distal segmental branch of the right lower lobe propagating into a subsegmental level. It is very small. No other acute pulmonary emboli are identified. No focal aortic aneurysm or  evidence of aortic dissection is seen.    Mediastinum/Alena: Cardiomegaly.    Lungs/Pleura :  Small pleural effusions. Lungs are clear.    Chest wall and Axilla: Unremarkable.    Bones:  No acute focal bony abnormality seen.    Upper abdomen: No focal abnormalities seen.    Impression  1. There is an extremely small acute pulmonary embolus in a far distal segmental branch of the right lower lobe propagating into a subsegmental level. It only measures a few millimeters across. No other acute  pulmonary emboli are identified.  2. Very small bilateral pleural effusions.  3. Cardiomegaly    Electronically signed by:  Michele Lomeli M.D.  3/6/2023 10:29 PM Mountain Time      Historical data copied forward from previous encounters in EMR including the history, exam, and assessment/plan has been reviewed and is unchanged except as I have noted and otherwise indicated.      Objective:         There were no vitals taken for this visit.    Physical Examination    General:  Well-developed, [place] well-nourished, cooperative, no acute distress, appears stated age if not slightly older    Neuro:  A&O x3. No significantly obvious focal neuro deficet    Psych:  Pleasant - mildly flattened affect    HENT:  Normocephalic, atraumatic, moist mucous membranes , Normal ear placement,Throat not injected   Eyes:  PERRLA, Conjunctivae not injected, EOM's intact, conjunctiva does not appear significantly injected   Neck:  Supple, Mildly thickened, no lymph adenopathy nor thyromegaly no JVD or bruit    Lungs:    Symmetrical rise & fall of chest with baseline respiratory pattern. To auscultation lungs are Clear bilaterally, faint late phase expiratory wheezes, no rhonchi or rales are noted, bases bilaterally   Chest wall:  No significant tenderness when palpated. PMI @ 6th ICS MAL    Heart:  Regular rate and rhythm, S1 and S2 normal, no S3 or S4, Gr I/VI systolic ejection murmur best heard at the apex , no obvious rub, click or gallop.    Abdomen:  non-distended, non-tender, bowel sounds noted in the 4 quadrants of the abdomen, significant adipose tissue identified    Extremities:  Equal color motion temperature and sensitivity, Rapid capillary refill noted within the nailbeds of fingers and toes bilaterally *** lower extremity edema.    Pulses:  2+ and symmetric all extremities, rapid capillary refill    Skin:  No obvious rashes, significant lesions identified, warm dry and of normal turgor      In-Office Procedure(s):  ECG  "12 Lead    (Results Pending)        Lab Review:   Anticoagulation Visit on 02/10/2025   Component Date Value    INR 02/10/2025 2.30 (A)    No results displayed because visit has over 200 results.      Anticoagulation Visit on 08/21/2024   Component Date Value    INR 08/21/2024 2.20      Recent labs reviewed and interpreted for clinical significance and application    ***            It is a pleasure to be involved in this patient's cardiovascular care relating to their heart failure.  Please feel free to call me with any questions or concerns.    Gurpreet \"Tommy\" Bob KHALIL, [facility]  Heart failure program clinical provider    Gurpreet Rojas, [facility]   02/11/25  .  "

## 2025-02-12 ENCOUNTER — HOSPITAL ENCOUNTER (OUTPATIENT)
Facility: HOSPITAL | Age: 75
Discharge: HOME | End: 2025-02-12

## 2025-02-12 ENCOUNTER — TELEPHONE (OUTPATIENT)
Facility: HOSPITAL | Age: 75
End: 2025-02-12
Payer: MEDICAID

## 2025-02-12 ENCOUNTER — READMISSION MANAGEMENT (OUTPATIENT)
Dept: CALL CENTER | Facility: HOSPITAL | Age: 75
End: 2025-02-12
Payer: MEDICAID

## 2025-02-12 DIAGNOSIS — I48.91 ATRIAL FIBRILLATION, UNSPECIFIED TYPE: ICD-10-CM

## 2025-02-12 DIAGNOSIS — I38 VALVULAR HEART DISEASE: Chronic | ICD-10-CM

## 2025-02-12 DIAGNOSIS — I42.0 CARDIOMYOPATHY, DILATED, NONISCHEMIC: Primary | Chronic | ICD-10-CM

## 2025-02-12 DIAGNOSIS — N18.32 STAGE 3B CHRONIC KIDNEY DISEASE: Chronic | ICD-10-CM

## 2025-02-12 DIAGNOSIS — I50.42 CHRONIC COMBINED SYSTOLIC AND DIASTOLIC HEART FAILURE: Chronic | ICD-10-CM

## 2025-02-12 DIAGNOSIS — I26.99 PULMONARY EMBOLISM ON LONG-TERM ANTICOAGULATION THERAPY: Chronic | ICD-10-CM

## 2025-02-12 DIAGNOSIS — R73.09 ELEVATED GLUCOSE: ICD-10-CM

## 2025-02-12 DIAGNOSIS — Z79.01 PULMONARY EMBOLISM ON LONG-TERM ANTICOAGULATION THERAPY: Chronic | ICD-10-CM

## 2025-02-12 DIAGNOSIS — Z91.89 AT HIGH RISK FOR EXCESS FLUID VOLUME: ICD-10-CM

## 2025-02-12 DIAGNOSIS — I27.20 PULMONARY HYPERTENSION: Chronic | ICD-10-CM

## 2025-02-12 DIAGNOSIS — Z91.199 POOR COMPLIANCE: ICD-10-CM

## 2025-02-12 DIAGNOSIS — R06.09 DOE (DYSPNEA ON EXERTION): ICD-10-CM

## 2025-02-12 NOTE — OUTREACH NOTE
CHF Week 1 Survey      Flowsheet Row Responses   [facility] patient discharged from? [place]   Does the patient have one of the following disease processes/diagnoses(primary or secondary)? CHF   CHF Week 1 attempt successful? Yes   Call start time 1501   Call end time 1517   Discharge diagnosis A/C CHF   Person spoke with today (if not patient) and relationship Daughter---Maria G Victoria   Medication alerts for this patient Bumex, Coreg, Jardiance, Aldactone   Meds reviewed with patient/caregiver? Yes   Is the patient having any side effects they believe may be caused by any medication additions or changes? No   Does the patient have all medications ordered at discharge? Yes   Prescription comments No concerns or questions with medications.   Is the patient taking all medications as directed (includes completed medication regime)? Yes   Comments regarding appointments [facility] [place] appt on 2/24/25 at 10:00 AM with [facility]I Walker.  Cardiology f/u appt on 3/10/25 at 11:00 AM with REMI tSorey.   Does the patient have a primary care provider?  Yes   Does the patient have an appointment with their PCP within 7 days of discharge? Yes   Comments regarding PCP PCP--patient is seeing PCP tomorrow for hospital f/u appt.   Has the patient kept scheduled appointments due by today? N/A   Has home health visited the patient within 72 hours of discharge? N/A   Pulse Ox monitoring None   Psychosocial issues? Yes   Psychosocial comments Daughter states patient cannot afford medications and office visits. She assisted patient in applying for Medicaid a few days ago. Patient has Medicare A only.   Did the patient receive a copy of their discharge instructions? Yes   Nursing interventions Reviewed instructions with patient  [with daughter--Maria G]   What is the patient's perception of their health status since discharge? Improving   Nursing interventions Nurse provided patient education   Is the patient  able to teach back signs and symptoms of worsening condition? (i.e. weight gain, shortness of air, etc.) Yes   If the patient is a current smoker, are they able to teach back resources for cessation? Not a smoker   Is the patient/caregiver able to teach back the hierarchy of who to call/visit for symptoms/problems? PCP, Specialist, Home health nurse, Urgent Care, ED, 911 Yes   CHF Zone this Call Green Zone   Green Zone Patient reports doing well, No new or worsening shortness of breath, Physical activity level is normal for you, No new swelling -  feet, ankles and legs look normal for you, No chest pain   Green Zone Interventions Daily weight check, Meds as directed, Low sodium diet, Follow up visits planned    CHF Week 1 call completed? Yes   Is the patient interested in additional calls from an ambulatory ? No   Would this patient benefit from a Referral to Amb Social Work? No   Wrap up additional comments Discussed with daughter at length of helping live with CHF and improve health. Patient does not have a scale to weight. Reviewed low sodium diet and fluid restrictions. Answered all of daughter's questions and educated on importance of Heart Failure Clinic f/u appt.   Call end time 1517            Shea QUIÑONEZ - Registered Nurse

## 2025-02-12 NOTE — TELEPHONE ENCOUNTER
Heart Failure Clinic: [facility]  Clinical Outreach     Guanakito Styles is a 74 y.o. male and is a patient of the [facility] heart failure clinic.     Patient's daughter, Maria G, called HF clinic to ask if there are any options to help with his Jardiance that was newly prescribed.  Filled 3 days at [facility] in [place] because he couldn't afford anymore.  Patient has Medicare A only with no prescription insurance.  Patient was meds 2 beds but was discharged on Sunday when Meds 2 beds is not available so I called in a 30 day free trial coupon card to [facility].  They are filling it today so patient can pick it up.  Daughter stated they have appointment tomorrow with [facility].  I told her to discuss options with [facility] and if they can't help, we can try patient assistance but they would have to come and sign forms.  She stated he only gets $1200 per month income for all expenses.    30 day trial card for Jardiance  RxBin: [id number]  Rx PCN: [id number]  RxGRP: [id number]  ID:[id number]     Artur Coronel, [facility] Clinical Pharmacist  2/12/2025  11:19 EST

## 2025-02-19 DIAGNOSIS — Z79.01 LONG TERM (CURRENT) USE OF ANTICOAGULANTS: ICD-10-CM

## 2025-02-19 DIAGNOSIS — I48.91 ATRIAL FIBRILLATION, UNSPECIFIED TYPE: ICD-10-CM

## 2025-02-19 RX ORDER — WARFARIN SODIUM 3 MG/1
TABLET ORAL
Qty: 30 TABLET | Refills: 2 | Status: SHIPPED | OUTPATIENT
Start: 2025-02-19

## 2025-02-19 RX ORDER — WARFARIN SODIUM 3 MG/1
TABLET ORAL
Qty: 14 TABLET | Refills: 0 | Status: SHIPPED | OUTPATIENT
Start: 2025-02-19

## 2025-02-19 NOTE — TELEPHONE ENCOUNTER
Rx Refill Note  Requested Prescriptions     Pending Prescriptions Disp Refills    warfarin (COUMADIN) 3 MG tablet [Pharmacy Med Name: WARFARIN SOD 3MG TABLETS (TAN)] 30 tablet 2     Sig: TAKE 1 TABLET BY MOUTH DAILY    Last INR 2/10/25  Last office visit with prescribing clinician: 4/27/2023   Last telemedicine visit with prescribing clinician: Visit date not found   Next office visit with prescribing clinician: 2/19/2025                         Would you like a call back once the refill request has been completed: [] Yes [] No    If the office needs to give you a call back, can they leave a voicemail: [] Yes [] No    Tila Damian RN  02/19/25, 12:10 EST

## 2025-02-19 NOTE — TELEPHONE ENCOUNTER
Rx Refill Note  Requested Prescriptions     Pending Prescriptions Disp Refills    warfarin (COUMADIN) 3 MG tablet 14 tablet 0     Sig: TAKE 1 TABLET BY MOUTH DAILY   Last INR 2/4/25   Last office visit with prescribing clinician: 4/27/2023   Last telemedicine visit with prescribing clinician: Visit date not found   Next office visit with prescribing clinician: Visit date not found                         Would you like a call back once the refill request has been completed: [] Yes [] No    If the office needs to give you a call back, can they leave a voicemail: [] Yes [] No    Tila Damian RN  02/19/25, 12:12 EST

## 2025-02-21 ENCOUNTER — READMISSION MANAGEMENT (OUTPATIENT)
Dept: CALL CENTER | Facility: HOSPITAL | Age: 75
End: 2025-02-21
Payer: MEDICAID

## 2025-02-21 NOTE — PROGRESS NOTES
"Cardiology Heart Failure Clinic Note  Gurpreet \"Tommy\" Bob KHALIL [facility]    Patient ID: Guanakito Styles  is a 74 y.o. male.    Encounter Date:02/24/2025    HPI:  74-year-old male patient of Dr. Sears with a significant past medical history including being diagnosed apparently with nonischemic dilated cardiomyopathy couple years ago he has both chronic combined systolic and diastolic heart failure (HFrEF, his LVIDD is very large at 7 cm, (TTE 2/5/2025) LVEF less than 20% with grade 3 diastolic dysfunction, previously was supposed to have a LifeVest which he has been noncompliant with for well over a year now, additionally known to have valvular heart disease with moderate MR and TR, as well as moderate to severe pulmonary hypertension with his RVSP around 49 mmHg, he also suffers from stage III chronic kidney disease nearing stage IV, history of atrial fibrillation anticoagulated with Coumadin and rate controlled with Coreg, history of January 2024 having a pulmonary embolism, elevated blood glucose, recently hospitalized on February 4 complaining of dyspnea and bilateral lower extremity edema and ran out of diuretic and anticoagulant at home for quite some time proBNP was at 16,000 chest x-ray of course showed pulmonary vascular congestion repeat TTE showed an EF of about 20% evaluated by nephrology since his CKD had worsened who was agreeable with the addition of Jardiance and ACE or ARB to be started as an outpatient.  Comes in for his initial heart failure visit on 12 February 2025 since recently hospitalized he has not had any excessive swelling in his lower extremities infected continues to go down his breathing has improved somewhat       Assessment:    Diagnoses and all orders for this visit:    1. Cardiomyopathy, dilated, nonischemic (Primary)  Overview:  A.  LVIDD 7 cm  B.  Chronic combined systolic and diastolic heart failure (HFrEF)  C.  (TTE 2/5/25) LVEF <20% with grade 3 diastolic dysfunction  D.  LifeVest " recommended March 23 no devices currently    Orders:  -     proBNP; Standing  -     proBNP    2. Chronic combined systolic and diastolic heart failure (HFrEF)  Overview:  A.  (TTE 2/5/25) LVEF <20% with grade 3 diastolic dysfunction   I. (TTE 3/7/23) LVEF-10 to 15%   B.  LifeVest recommended March 2023           I.   no devices currently    Orders:  -     proBNP; Standing  -     proBNP    3. Moderate to severe pulmonary hypertension  Overview:  RVSP 49 mmHg    Orders:  -     proBNP; Standing  -     proBNP    4. Valvular heart disease  Overview:  A moderate to severe MR  B.  Moderate severe TR    Orders:  -     proBNP; Standing  -     proBNP    5. Stage 3b chronic kidney disease  Overview:  Followed by -Dr. Gramajo.     Orders:  -     CBC & Differential; Standing  -     CBC & Differential  -     Hepatic Function Panel; Standing  -     Hepatic Function Panel    6. At high risk for excess fluid volume  Overview:  A.  HFrEF  B.  Pulmonary hypertension  C.  Moderate to severe valvular heart disease  D.  Stage IIIb CKD      7. Atrial fibrillation, unspecified type  Overview:  A.  Rate controlled with carvedilol  B.  Anticoagulated with warfarin sodium  C.  No history of DCCV and/or ablation identified in epic    Orders:  -     TSH; Standing  -     TSH  -     Magnesium; Standing  -     Magnesium    8. h/o Jan 24 Pulmonary embolism on long-term anticoagulation therapy  -     Protime-INR; Standing  -     Protime-INR    9. Elevated glucose  -     Hemoglobin A1c; Standing  -     Hemoglobin A1c    10. QUINONES (dyspnea on exertion)  -     proBNP; Standing  -     proBNP  -     Basic Metabolic Panel; Standing  -     Basic Metabolic Panel  -     TSH; Standing  -     TSH  -     Hemoglobin A1c; Standing  -     Hemoglobin A1c  -     Magnesium; Standing  -     Magnesium  -     Protime-INR; Standing  -     Protime-INR  -     CBC & Differential; Standing  -     CBC & Differential  -     Hepatic Function Panel; Standing  -     Hepatic Function  Panel  -     Lipid Panel; Standing  -     Lipid Panel    11. Poor compliance              Plan/discussion    Volume overload    Heart Failure Core Measures addressed    Type of Overload  multifactorial    HFrEF    Pulmonary hypertension    Moderate to severe valvular heart disease    Stage IIIb CKD    Most recent EF:   (TTE 2/5/25) LVEF <20% with grade 3 diastolic dysfunction                                       I. (TTE 3/7/23) LVEF-10 to 15%     [place] Heart association Class & Stage : II b    HF Meds Pre Visit    Beta Blocker: Coreg 3.125 mg twice daily  ARNI/ACE/ARB: None  SGLT 2 inhibitors: Jardiance 10 mg daily  Diuretics: Bumex 1 mg twice daily  Aldosterone Antagonist: Spironolactone 25 mg daily  Digitalis: N/A  Vasodilators & Nitrates: N/A    HF Meds Post Visit    Beta Blocker: Coreg 3.125 mg every 12 hours  ARNI/ACE/ARB: Lisinopril 2.5 mg daily  SGLT 2 inhibitors: Jardiance 10 mg daily  Diuretics: Bumex 1 mg daily  Furoscix: None ordered at this juncture secondary to insurance status  Aldosterone Antagonist: Spironolactone was reduced to 12.5 mg daily  Digitalis: N/A  Nitrates: Currently not indicated      Warfarin sodium as directed    Cardiac medicines reviewed with risk, benefits, and necessity of each discussed.       _________________________________________________________    Discussion    With known chronic combined systolic diastolic heart failure with a significant diastolic grade 3 component the patient is already on heart failure core measures including beta-blocker, SGLT2, diuretic,  Aldactone   Most effective GDMT would be a ACE, SGLT2, diuretic, and Aldactone   Since he with an EGFR of only 45 would obviously prefer Dr. Gramajo his nephrologist manages diuretics however in the interim we will till he can establish a follow-up for which we are attempting to set up an appointment  He obviously needs a follow-up with Dr. Saenz   given the severity of his MR and potentially TR some consideration  could be made for notification to the valvular heart clinic for possible mitral clipping versus tricuspid clipping however will defer to Dr. Saenz for the actual consult given he is his primary cardiologist  Per our protocol we will go ahead and notify the valve coordinator the patient's name and issues  Might be beneficial to see a pulmonologist regarding his pulmonary hypertension with an RVSP of 49 mmHg however will not make the consult instead defer again to Dr. Saenz  And going to continue to encourage compliance given his history of noncompliance  He is rather adamant about not wearing a LifeVest  If there is no recovery in his ejection fraction on follow-up echo in 90 days would certainly recommend ICD and would defer to EP regarding the type of ICD he needs and would allow Dr. Saenz to make the consult to EP  Needs to continue to follow-up with Dr. Gramajo his primary nephrologist  Would add typical heart failure nursing interventions including:        A. Fluid restriction at less than 2000 cc daily       B. Daily weights        C.  1 g low-sodium diet        I did the following activities:preparing for the visit, with Guanakito Styles  including reviewing tests, once pt arrived in clinic I also performed a medically appropriate examination and/or evaluation , I personally spent considerable time counseling and educating the patient/family/caregiver, ordering medications, tests, or procedures, referring and communicating with other health care professionals , and documenting information in the medical record. I estimate including preparation 45 minutes             Subjective:     Chief Complaint   Patient presents with    Congestive Heart Failure       ROS:  Constitutional: + weakness, + fatigue persist, No fever, rigors, chills   Eyes: No recent vision changes, eye pain   ENT/oropharynx: No recent difficulty swallowing, sore throat, epistaxis, changes in hearing   Cardiovascular: No recent chest pain, chest  tightness, palpitations except as noted in HPI, paroxysmal nocturnal dyspnea, orthopnea, diaphoresis, dizziness & no pre or mark syncopal episodes   Respiratory: No significant shortness of breath at rest, + dyspnea on exertion he is unable to supply me with distance where it occurs, no significant productive cough, no wheezing and no hemoptysis   Gastrointestinal: No abdominal pain, nausea, vomiting, diarrhea, bloody stools   Genitourinary: No hematuria, dysuria other than increased frequency given the recent increase in his diuretics   Neurological: No change in typical headache, no new onset of any tremors, numbness,  or hemiparesis    Musculoskeletal: No change in typical cramps, myalgias, no new joint pain, joint swelling   Integument: No recent rash, + edema but better significantly reduced since recent hospitalization      Patient Active Problem List   Diagnosis    Dyspnea, unspecified type    Acute pulmonary embolism    Pulmonary vascular congestion    Elevated glucose    Elevated serum creatinine    Cardiomegaly    Elevated troponin    Long term (current) use of anticoagulants    Atrial fibrillation    Moderate to severe pulmonary hypertension    Valvular heart disease    Cardiomyopathy, dilated, nonischemic    Chronic combined systolic and diastolic heart failure (HFrEF)    Stage 3b chronic kidney disease    At high risk for excess fluid volume    h/o Jan 24 Pulmonary embolism on long-term anticoagulation therapy    Poor compliance    QUINONES (dyspnea on exertion)       No past medical history on file.    Past Surgical History:   Procedure Laterality Date    CARDIAC CATHETERIZATION N/A 3/8/2023    Procedure: Left Heart Cath and coronary angiogram;  Surgeon: Axel Saenz MD;  Location: [facility] CATH INVASIVE LOCATION;  Service: Cardiovascular;  Laterality: N/A;    CARDIAC CATHETERIZATION  3/8/2023    Procedure: Right and Left Heart Cath;  Surgeon: Axel Saenz MD;  Location: [facility] CATH INVASIVE  LOCATION;  Service: Cardiovascular;;       Social History     Socioeconomic History    Marital status:    Tobacco Use    Smoking status: Former     Types: Cigarettes     Passive exposure: Past    Smokeless tobacco: Never   Vaping Use    Vaping status: Never Used   Substance and Sexual Activity    Alcohol use: Yes    Drug use: Never    Sexual activity: Defer       Allergies   Allergen Reactions    Albuterol Other (See Comments)     Pt reports that breathing is worse with use    Penicillins Unknown - Low Severity         Current Outpatient Medications:     bumetanide (BUMEX) 1 MG tablet, Take 1 tablet by mouth 2 (Two) Times a Day for 30 days., Disp: 60 tablet, Rfl: 0    carvedilol (COREG) 3.125 MG tablet, Take 1 tablet by mouth 2 (Two) Times a Day With Meals for 30 days., Disp: 60 tablet, Rfl: 0    empagliflozin (Jardiance) 10 MG tablet tablet, Take 1 tablet by mouth Daily for 30 days., Disp: 30 tablet, Rfl: 0    spironolactone (ALDACTONE) 25 MG tablet, Take 1 tablet by mouth Daily for 30 days., Disp: 30 tablet, Rfl: 0    warfarin (COUMADIN) 3 MG tablet, TAKE 1 TABLET BY MOUTH DAILY, Disp: 30 tablet, Rfl: 2    warfarin (COUMADIN) 3 MG tablet, TAKE 1 TABLET BY MOUTH DAILY, Disp: 14 tablet, Rfl: 0      There is no immunization history on file for this patient.    Most recent EKG as reviewed:  Procedures     Most recent echo as reviewed:  Results for orders placed during the hospital encounter of 02/04/25    Adult Transthoracic Echo Complete w/ Color, Spectral and Contrast if Necessary Per Protocol    Interpretation Summary    Left ventricular ejection fraction appears to be less than 20%.    Indications  Shortness of breath    Technically satisfactory study.  Mitral valve is structurally normal.  Tricuspid valve is structurally normal.  Aortic valve is structurally normal.  Pulmonic valve could not be well visualized.  Significant mitral and tricuspid regurgitation is present.  Significant biatrial  enlargement.  Significant left ventricle enlargement and severe and diffuse hypocontractility with ejection fraction of less than 20%.  Grade 3 left ventricular diastolic dysfunction is present.  Left ventricular peak systolic global longitudinal strain is -5%.  Right ventricle is enlarged with hypocontractility.  TAPSE 1.52 cm.  Atrial septum is intact.  Aorta is normal.  IVC dilatation with decreased respiratory variation suggestive of increased right atrial pressure.  No pericardial effusion or intracardiac thrombus is seen.    Impression  Significant mitral and tricuspid regurgitation is present.  Significant biatrial enlargement.  Significant left ventricle enlargement and severe and diffuse hypocontractility with ejection fraction of less than 20%.  Grade 3 left ventricular diastolic dysfunction is present.  Left ventricular peak systolic global longitudinal strain is -5%.  Right ventricle is enlarged with hypocontractility.  TAPSE 1.52 cm.      Most recent stress test results:  Results for orders placed during the hospital encounter of 03/06/23    Stress Test With Myocardial Perfusion One Day    Interpretation Summary  Indications  Shortness of breath  Chest pain    This study was performed under the direct supervision Hemalatha TAVERAS.    Resting ECG  Sinus rhythm left bundle branch block    Patient exercised for 3.17 minutes using the Kristopher protocol.  The maximum heart rate achieved was 110 and maximum systolic blood pressure 100.  Patient did not have any chest discomfort ST-T wave abnormalities but had premature ventricular contractions.    Cardiolite was used as an imaging agent.    Cardiolite images significantly decreased radionuclide uptake in the inferior and posterolateral segments with partial redistribution in the resting images.    Gated SPECT images revealed left ventricle enlargement with severe and diffuse hypocontractility with ejection fraction of 10%.    Impression  ========    Limited exercise  tolerance.    Significantly decreased radionuclide uptake in the inferior and posterolateral segments with partial redistribution.    Gated SPECT images revealed significant left ventricle enlargement with severe and diffuse hypocontractility with ejection fraction of 10%.  Possible cardiomyopathy ischemic versus nonischemic.    Suggest clinical correlation and correlation with echocardiographic findings.      Most recent cardiac catheterization results:  Results for orders placed during the hospital encounter of 03/06/23    Cardiac Catheterization/Vascular Study    Conclusion  CARDIAC CATHETERIZATION REPORT    DATE OF PROCEDURE:  3/8/2023    INDICATION FOR PROCEDURE:  Acute systolic congestive heart failure  Cardiomyopathy  Shortness of breath  Abnormal stress Cardiolite test    PROCEDURE PERFORMED:  Right heart catheterization left heart catheterization, coronary angiography  Left ventricle angiogram was not performed due to pre-existing renal dysfunction.    @@  Moderate conscious sedation was utilized with intravenous Versed and fentanyl administered by registered nurse with continuous ECG, pulse oximetry and hemodynamic monitoring supervised by myself throughout the entire procedure.  Conscious sedation time   30 minutes    I was present with the patient for the duration of moderate sedation and supervised staff who had no other duties and monitored the patient for the entire procedure.    @@    Under usual sterile precautions and 1% Xylocaine filtration right femoral vein and femoral artery were percutaneously punctured and a 7 and 5 Romanian vascular sheaths were respectively inserted.  [place]-Eliana catheter was used to measure right-sided pressures pulmonary capital wedge pressure was measured.  [place]-Eliana catheter was removed and subsequently left and right coronary arteriography was performed.  Left ventricular pressures were measured with pigtail catheter.  Left ventricle angiogram was not performed due to  pre-existing renal dysfunction.  Patient tolerated the procedure well.  Hemostasis was obtained and patient was returned to the room with intact pulses.      FINDINGS:    1. HEMODYNAMICS:  Left ventricle end-diastolic pressure is normal.  Mean right atrial pressure is 8 right ventricle 42/3 pulmonary artery 47/19 mean pulmonary artery 31 and pulmonary capital wedge pressure is 19.    2. LEFT VENTRICULOGRAPHY:  Not performed due to pre-existing renal dysfunction.    3. CORONARY ANGIOGRAPHY:  Left main coronary artery is normal.  Left anterior descending artery is normal.  Circumflex coronary artery is normal.  Right coronary artery is a large and dominant vessel and is normal.    SUMMARY:  Mild pulmonary hypertension.  Left ventricle angiogram was not performed due to pre-existing renal dysfunction.  Patient has an ejection fraction of 10 to 15% (echocardiogram)  Normal epicardial coronary arteries.    RECOMMENDATIONS:  GDMT for nonischemic cardiomyopathy as tolerated.  Intensive but cautious diuresis with close observation of renal function.  Patient would benefit from LifeVest.  Modification of risk factors.  Patient would benefit from ICD if left ventricular function does not improve with GDMT.        Imaging:    Results for orders placed during the hospital encounter of 02/04/25    XR Chest 2 View    Narrative  XR CHEST 2 VW    Date of Exam: 2/4/2025 2:58 PM EST    Indication: soa    Comparison: 3/6/2023    Findings:  Cardiomegaly. Mediastinal contours within normal limits. Mild pulmonary vascular congestion. Query mild edema.    Impression  Impression:  Cardiomegaly and mild pulmonary vascular congestion.        Electronically Signed: Andrew Lennon MD  2/4/2025 3:17 PM EST  Workstation ID: [id number]       Results for orders placed during the hospital encounter of 02/04/25    US Renal Bilateral    Narrative  US RENAL BILATERAL    Date of Exam: 2/5/2025 2:58 PM EST    Indication: nato.    Comparison: No comparisons  available.    Technique: Grayscale and color Doppler ultrasound evaluation of the kidneys and urinary bladder was performed.      Findings:  The right kidney measures 11.7 x 4.9 x 5.9 cm. The left kidney measures 12 x 6.5 x 5.8 cm. Corticomedullary transition maintained. No hydronephrosis. No debris in the bladder.    Impression  Impression:  No hydronephrosis or acute sonographic abnormality.            Electronically Signed: Lakhwinder Rawls MD  2/5/2025 3:11 PM EST  Workstation ID: [id number]      Results for orders placed during the hospital encounter of 03/06/23    CT Angiogram Chest Pulmonary Embolism    Narrative  CT ANGIOGRAM CHEST WITH IV CONTRAST    INDICATION: Cough    COMPARISON: None available.    PROCEDURE: Multiple axial CT images were obtained of the chest after IV administration of 100 mL of Isovue-370..  Coronal and sagittal reformations as well as MIP images were obtained.  CT dose lowering techniques were used, to include: automated  exposure control, adjustment for patient size, and or use of iterative reconstruction.    FINDINGS:    Cardiovascular: There is a solitary miniscule pulmonary embolus in a distal segmental branch of the right lower lobe propagating into a subsegmental level. It is very small. No other acute pulmonary emboli are identified. No focal aortic aneurysm or  evidence of aortic dissection is seen.    Mediastinum/Alena: Cardiomegaly.    Lungs/Pleura :  Small pleural effusions. Lungs are clear.    Chest wall and Axilla: Unremarkable.    Bones:  No acute focal bony abnormality seen.    Upper abdomen: No focal abnormalities seen.    Impression  1. There is an extremely small acute pulmonary embolus in a far distal segmental branch of the right lower lobe propagating into a subsegmental level. It only measures a few millimeters across. No other acute pulmonary emboli are identified.  2. Very small bilateral pleural effusions.  3. Cardiomegaly    Electronically signed by:  Michele  SADIE Lomeli  3/6/2023 10:29 PM Mountain Time      Historical data copied forward from previous encounters in EMR including the history, exam, and assessment/plan has been reviewed and is unchanged except as I have noted and otherwise indicated.      Objective:         /73   Pulse 86   Resp 18   Wt 98.8 kg (217 lb 12.8 oz)   SpO2 99%   BMI 31.25 kg/m²     Physical Examination    General:  Well-developed, longhaired, well-nourished, cooperative, no acute distress, appears stated age if not slightly older    Neuro:  A&O x3. No significantly obvious focal neuro deficet    Psych:  Pleasant - mildly flattened affect    HENT:  Normocephalic, atraumatic, moist mucous membranes , Normal ear placement,Throat not injected   Eyes:  PERRLA, Conjunctivae not injected, EOM's intact, conjunctiva does not appear significantly injected   Neck:  Supple, Mildly thickened, no lymph adenopathy nor thyromegaly no JVD or bruit    Lungs:    Symmetrical rise & fall of chest with baseline respiratory pattern. To auscultation lungs are Clear bilaterally, faint late phase expiratory wheezes, no rhonchi or rales are noted, bases bilaterally   Chest wall:  No significant tenderness when palpated. PMI @ 6th ICS MAL    Heart:  Regular rate and rhythm, S1 and S2 normal, no S3, there is an obvious S4, Gr III/VI systolic ejection murmur best heard at the apex , no obvious rub, click or gallop.    Abdomen:  non-distended, non-tender, bowel sounds noted in the 4 quadrants of the abdomen, he is well without significant central abdominal adipose tissue identified    Extremities:  Equal color motion temperature and sensitivity, Rapid capillary refill noted within the nailbeds of fingers and toes bilaterally trace to 1+ lower extremity edema.    Pulses:  2+ and symmetric all extremities, rapid capillary refill    Skin:  No obvious rashes, significant lesions identified, warm dry and of normal turgor      In-Office Procedure(s):  No orders to  display        Lab Review:   Hospital Outpatient Visit on 02/24/2025   Component Date Value    proBNP 02/24/2025 5,060.0 (H)     Glucose 02/24/2025 112 (H)     BUN 02/24/2025 32 (H)     Creatinine 02/24/2025 1.60 (H)     Sodium 02/24/2025 134 (L)     Potassium 02/24/2025 4.6     Chloride 02/24/2025 93 (L)     CO2 02/24/2025 26.7     Calcium 02/24/2025 9.8     BUN/Creatinine Ratio 02/24/2025 20.0     Anion Gap 02/24/2025 14.3     eGFR 02/24/2025 45.5 (L)     TSH 02/24/2025 4.190     Hemoglobin A1C 02/24/2025 6.11 (H)     Magnesium 02/24/2025 2.8 (H)     Protime 02/24/2025 19.2 (L)     INR 02/24/2025 1.61 (L)     Total Protein 02/24/2025 9.0 (H)     Albumin 02/24/2025 4.4     ALT (SGPT) 02/24/2025 62 (H)     AST (SGOT) 02/24/2025 56 (H)     Alkaline Phosphatase 02/24/2025 95     Total Bilirubin 02/24/2025 0.6     Bilirubin, Direct 02/24/2025 0.2     Bilirubin, Indirect 02/24/2025 0.4     Total Cholesterol 02/24/2025 199     Triglycerides 02/24/2025 192 (H)     HDL Cholesterol 02/24/2025 45     LDL Cholesterol  02/24/2025 120 (H)     VLDL Cholesterol 02/24/2025 34     LDL/HDL Ratio 02/24/2025 2.57     WBC 02/24/2025 9.43     RBC 02/24/2025 5.14     Hemoglobin 02/24/2025 14.1     Hematocrit 02/24/2025 45.5     MCV 02/24/2025 88.5     MCH 02/24/2025 27.4     MCHC 02/24/2025 31.0 (L)     RDW 02/24/2025 15.1     RDW-SD 02/24/2025 49.6     MPV 02/24/2025 11.1     Platelets 02/24/2025 290     Neutrophil % 02/24/2025 58.6     Lymphocyte % 02/24/2025 28.1     Monocyte % 02/24/2025 11.5     Eosinophil % 02/24/2025 1.0     Basophil % 02/24/2025 0.5     Immature Grans % 02/24/2025 0.3     Neutrophils, Absolute 02/24/2025 5.53     Lymphocytes, Absolute 02/24/2025 2.65     Monocytes, Absolute 02/24/2025 1.08 (H)     Eosinophils, Absolute 02/24/2025 0.09     Basophils, Absolute 02/24/2025 0.05     Immature Grans, Absolute 02/24/2025 0.03     nRBC 02/24/2025 0.0    Anticoagulation Visit on 02/10/2025   Component Date Value    INR  "02/10/2025 2.30 (A)    No results displayed because visit has over 200 results.        Recent labs reviewed and interpreted for clinical significance and application    I went over each of the labs individually with Guanakito while he was still here in the heart failure clinic as well as his daughter            It is a pleasure to be involved in this patient's cardiovascular care relating to their heart failure.  Please feel free to call me with any questions or concerns.    Gurpreet \"Tommy\" Bob [facility], [facility]  Heart failure program clinical provider    REMI Person   02/11/25  .  "

## 2025-02-21 NOTE — OUTREACH NOTE
CHF Week 2 Survey      Flowsheet Row Responses   [facility] patient discharged from? [facility]   Does the patient have one of the following disease processes/diagnoses(primary or secondary)? CHF   Week 2 attempt successful? Yes   Call start time 1721   Call end time 1725   Discharge diagnosis A/C CHF   Is patient permission given to speak with other caregiver? Yes   List who call center can speak with Dtr   Person spoke with today (if not patient) and relationship Daughter---Maria G Victoria   Is the patient taking all medications as directed (includes completed medication regime)? Yes   Has the patient kept scheduled appointments due by today? Yes   Psychosocial comments .   Comments Per pt's dtr, the pt is doing well, his LE edema has improved and his respiratory status is better. Patient is monitoring wts and taking his medications per dtr's report.   What is the patient's perception of their health status since discharge? Improving   Nursing interventions Nurse provided patient education   Is the patient/caregiver able to teach back the hierarchy of who to call/visit for symptoms/problems? PCP, Specialist, Home health nurse, Urgent Care, ED, 911 Yes   CHF Zone this Call Green Zone   Green Zone Patient reports doing well, No new or worsening shortness of breath, No new swelling -  feet, ankles and legs look normal for you   Green Zone Interventions Daily weight check, Meds as directed, Follow up visits planned   CHF Week 2 call completed? Yes   Revoked No further contact(revokes)-requires comment   Call end time 1725            Mya SHABAZZ - Registered Nurse

## 2025-02-24 ENCOUNTER — APPOINTMENT (OUTPATIENT)
Dept: CARDIOLOGY | Facility: HOSPITAL | Age: 75
End: 2025-02-24

## 2025-02-24 ENCOUNTER — DISEASE STATE MANAGEMENT VISIT (OUTPATIENT)
Facility: HOSPITAL | Age: 75
End: 2025-02-24
Payer: MEDICAID

## 2025-02-24 ENCOUNTER — HOSPITAL ENCOUNTER (OUTPATIENT)
Facility: HOSPITAL | Age: 75
Discharge: HOME | End: 2025-02-24
Payer: MEDICAID

## 2025-02-24 VITALS
HEART RATE: 86 BPM | DIASTOLIC BLOOD PRESSURE: 73 MMHG | RESPIRATION RATE: 18 BRPM | SYSTOLIC BLOOD PRESSURE: 107 MMHG | OXYGEN SATURATION: 99 % | BODY MASS INDEX: 31.25 KG/M2 | WEIGHT: 217.8 LBS

## 2025-02-24 DIAGNOSIS — Z91.199 POOR COMPLIANCE: ICD-10-CM

## 2025-02-24 DIAGNOSIS — Z91.89 AT HIGH RISK FOR EXCESS FLUID VOLUME: ICD-10-CM

## 2025-02-24 DIAGNOSIS — I48.91 ATRIAL FIBRILLATION, UNSPECIFIED TYPE: ICD-10-CM

## 2025-02-24 DIAGNOSIS — I42.0 CARDIOMYOPATHY, DILATED, NONISCHEMIC: Primary | Chronic | ICD-10-CM

## 2025-02-24 DIAGNOSIS — R06.09 DOE (DYSPNEA ON EXERTION): ICD-10-CM

## 2025-02-24 DIAGNOSIS — I26.99 PULMONARY EMBOLISM ON LONG-TERM ANTICOAGULATION THERAPY: Chronic | ICD-10-CM

## 2025-02-24 DIAGNOSIS — I27.20 PULMONARY HYPERTENSION: Chronic | ICD-10-CM

## 2025-02-24 DIAGNOSIS — N18.32 STAGE 3B CHRONIC KIDNEY DISEASE: ICD-10-CM

## 2025-02-24 DIAGNOSIS — I50.42 CHRONIC COMBINED SYSTOLIC AND DIASTOLIC HEART FAILURE: Chronic | ICD-10-CM

## 2025-02-24 DIAGNOSIS — I38 VALVULAR HEART DISEASE: Chronic | ICD-10-CM

## 2025-02-24 DIAGNOSIS — R06.00 DYSPNEA, UNSPECIFIED TYPE: ICD-10-CM

## 2025-02-24 DIAGNOSIS — R73.09 ELEVATED GLUCOSE: ICD-10-CM

## 2025-02-24 DIAGNOSIS — Z79.01 PULMONARY EMBOLISM ON LONG-TERM ANTICOAGULATION THERAPY: Chronic | ICD-10-CM

## 2025-02-24 LAB
ALBUMIN SERPL-MCNC: 4.4 G/DL (ref 3.5–5.2)
ALP SERPL-CCNC: 95 U/L (ref 39–117)
ALT SERPL W P-5'-P-CCNC: 62 U/L (ref 1–41)
ANION GAP SERPL CALCULATED.3IONS-SCNC: 14.3 MMOL/L (ref 5–15)
AST SERPL-CCNC: 56 U/L (ref 1–40)
BASOPHILS # BLD AUTO: 0.05 10*3/MM3 (ref 0–0.2)
BASOPHILS NFR BLD AUTO: 0.5 % (ref 0–1.5)
BILIRUB CONJ SERPL-MCNC: 0.2 MG/DL (ref 0–0.3)
BILIRUB INDIRECT SERPL-MCNC: 0.4 MG/DL
BILIRUB SERPL-MCNC: 0.6 MG/DL (ref 0–1.2)
BUN SERPL-MCNC: 32 MG/DL (ref 8–23)
BUN/CREAT SERPL: 20 (ref 7–25)
CALCIUM SPEC-SCNC: 9.8 MG/DL (ref 8.6–10.5)
CHLORIDE SERPL-SCNC: 93 MMOL/L (ref 98–107)
CHOLEST SERPL-MCNC: 199 MG/DL (ref 0–200)
CO2 SERPL-SCNC: 26.7 MMOL/L (ref 22–29)
CREAT SERPL-MCNC: 1.6 MG/DL (ref 0.76–1.27)
DEPRECATED RDW RBC AUTO: 49.6 FL (ref 37–54)
EGFRCR SERPLBLD CKD-EPI 2021: 45.5 ML/MIN/1.73
EOSINOPHIL # BLD AUTO: 0.09 10*3/MM3 (ref 0–0.4)
EOSINOPHIL NFR BLD AUTO: 1 % (ref 0.3–6.2)
ERYTHROCYTE [DISTWIDTH] IN BLOOD BY AUTOMATED COUNT: 15.1 % (ref 12.3–15.4)
GLUCOSE SERPL-MCNC: 112 MG/DL (ref 65–99)
HBA1C MFR BLD: 6.11 % (ref 4.8–5.6)
HCT VFR BLD AUTO: 45.5 % (ref 37.5–51)
HDLC SERPL-MCNC: 45 MG/DL (ref 40–60)
HGB BLD-MCNC: 14.1 G/DL (ref 13–17.7)
IMM GRANULOCYTES # BLD AUTO: 0.03 10*3/MM3 (ref 0–0.05)
IMM GRANULOCYTES NFR BLD AUTO: 0.3 % (ref 0–0.5)
INR PPP: 1.61 (ref 2–3)
LDLC SERPL CALC-MCNC: 120 MG/DL (ref 0–100)
LDLC/HDLC SERPL: 2.57 {RATIO}
LYMPHOCYTES # BLD AUTO: 2.65 10*3/MM3 (ref 0.7–3.1)
LYMPHOCYTES NFR BLD AUTO: 28.1 % (ref 19.6–45.3)
MAGNESIUM SERPL-MCNC: 2.8 MG/DL (ref 1.6–2.4)
MCH RBC QN AUTO: 27.4 PG (ref 26.6–33)
MCHC RBC AUTO-ENTMCNC: 31 G/DL (ref 31.5–35.7)
MCV RBC AUTO: 88.5 FL (ref 79–97)
MONOCYTES # BLD AUTO: 1.08 10*3/MM3 (ref 0.1–0.9)
MONOCYTES NFR BLD AUTO: 11.5 % (ref 5–12)
NEUTROPHILS NFR BLD AUTO: 5.53 10*3/MM3 (ref 1.7–7)
NEUTROPHILS NFR BLD AUTO: 58.6 % (ref 42.7–76)
NRBC BLD AUTO-RTO: 0 /100 WBC (ref 0–0.2)
NT-PROBNP SERPL-MCNC: 5060 PG/ML (ref 0–900)
PLATELET # BLD AUTO: 290 10*3/MM3 (ref 140–450)
PMV BLD AUTO: 11.1 FL (ref 6–12)
POTASSIUM SERPL-SCNC: 4.6 MMOL/L (ref 3.5–5.2)
PROT SERPL-MCNC: 9 G/DL (ref 6–8.5)
PROTHROMBIN TIME: 19.2 SECONDS (ref 19.4–28.5)
RBC # BLD AUTO: 5.14 10*6/MM3 (ref 4.14–5.8)
SODIUM SERPL-SCNC: 134 MMOL/L (ref 136–145)
TRIGL SERPL-MCNC: 192 MG/DL (ref 0–150)
TSH SERPL DL<=0.05 MIU/L-ACNC: 4.19 UIU/ML (ref 0.27–4.2)
VLDLC SERPL-MCNC: 34 MG/DL (ref 5–40)
WBC NRBC COR # BLD AUTO: 9.43 10*3/MM3 (ref 3.4–10.8)

## 2025-02-24 PROCEDURE — 80076 HEPATIC FUNCTION PANEL: CPT | Performed by: NURSE PRACTITIONER

## 2025-02-24 PROCEDURE — 83735 ASSAY OF MAGNESIUM: CPT | Performed by: NURSE PRACTITIONER

## 2025-02-24 PROCEDURE — 85025 COMPLETE CBC W/AUTO DIFF WBC: CPT | Performed by: NURSE PRACTITIONER

## 2025-02-24 PROCEDURE — 85610 PROTHROMBIN TIME: CPT | Performed by: NURSE PRACTITIONER

## 2025-02-24 PROCEDURE — 84443 ASSAY THYROID STIM HORMONE: CPT | Performed by: NURSE PRACTITIONER

## 2025-02-24 PROCEDURE — 80061 LIPID PANEL: CPT | Performed by: NURSE PRACTITIONER

## 2025-02-24 PROCEDURE — G0463 HOSPITAL OUTPT CLINIC VISIT: HCPCS

## 2025-02-24 PROCEDURE — 83036 HEMOGLOBIN GLYCOSYLATED A1C: CPT | Performed by: NURSE PRACTITIONER

## 2025-02-24 PROCEDURE — 83880 ASSAY OF NATRIURETIC PEPTIDE: CPT | Performed by: NURSE PRACTITIONER

## 2025-02-24 PROCEDURE — 80048 BASIC METABOLIC PNL TOTAL CA: CPT | Performed by: NURSE PRACTITIONER

## 2025-02-24 RX ORDER — SPIRONOLACTONE 25 MG/1
12.5 TABLET ORAL DAILY
Qty: 45 TABLET | Refills: 2 | Status: SHIPPED | OUTPATIENT
Start: 2025-02-24

## 2025-02-24 RX ORDER — CARVEDILOL 3.12 MG/1
3.12 TABLET ORAL 2 TIMES DAILY WITH MEALS
Qty: 180 TABLET | Refills: 2 | Status: SHIPPED | OUTPATIENT
Start: 2025-02-24

## 2025-02-24 RX ORDER — LISINOPRIL 5 MG/1
2.5 TABLET ORAL DAILY
Qty: 90 TABLET | Refills: 2 | Status: SHIPPED | OUTPATIENT
Start: 2025-02-24

## 2025-02-24 RX ORDER — BUMETANIDE 1 MG/1
1 TABLET ORAL 2 TIMES DAILY
Qty: 180 TABLET | Refills: 2 | Status: SHIPPED | OUTPATIENT
Start: 2025-02-24 | End: 2025-02-25 | Stop reason: SDUPTHER

## 2025-02-24 NOTE — LETTER
"February 24, 2025     Axel Saenz MD  [address] [facility] [place] [address]    Patient: Guanakito Styles   YOB: 1950   Date of Visit: 2/24/2025     Dear Axel Saenz MD:       The hospitalist have referred Guanakito Styles to me for evaluation. Below are the relevant portions of my assessment and plan of care.    If you have questions, please do not hesitate to call me. I look forward to following Guanakito along with you.         Sincerely,        REMI Person        CC: No Recipients    Gurpreet Rojas APRN  02/24/25 1520  Signed  Cardiology Heart Failure Clinic Note  Gurpreet \"Tommy\" Bob KHALIL, [facility]    Patient ID: Guanakito Styles  is a 74 y.o. male.    Encounter Date:02/24/2025    HPI:  74-year-old male patient of Dr. Saenz's with a significant past medical history including being diagnosed apparently with nonischemic dilated cardiomyopathy couple years ago he has both chronic combined systolic and diastolic heart failure (HFrEF, his LVIDD is very large at 7 cm, (TTE 2/5/2025) LVEF less than 20% with grade 3 diastolic dysfunction, previously was supposed to have a LifeVest which he has been noncompliant with for well over a year now, additionally known to have valvular heart disease with moderate MR and TR, as well as moderate to severe pulmonary hypertension with his RVSP around 49 mmHg, he also suffers from stage III chronic kidney disease nearing stage IV, history of atrial fibrillation anticoagulated with Coumadin and rate controlled with Coreg, history of January 2024 having a pulmonary embolism, elevated blood glucose, recently hospitalized on February 4 complaining of dyspnea and bilateral lower extremity edema and ran out of diuretic and anticoagulant at home for quite some time proBNP was at 16,000 chest x-ray of course showed pulmonary vascular congestion repeat TTE showed an EF of about 20% evaluated by nephrology since his CKD had worsened who was agreeable with the addition " of Jardiance and ACE or ARB to be started as an outpatient.  Comes in for his initial heart failure visit on 12 February 2025 since recently hospitalized he has not had any excessive swelling in his lower extremities infected continues to go down his breathing has improved somewhat       Assessment:    Diagnoses and all orders for this visit:    1. Cardiomyopathy, dilated, nonischemic (Primary)  Overview:  A.  LVIDD 7 cm  B.  Chronic combined systolic and diastolic heart failure (HFrEF)  C.  (TTE 2/5/25) LVEF <20% with grade 3 diastolic dysfunction  D.  LifeVest recommended March 23 no devices currently    Orders:  -     proBNP; Standing  -     proBNP    2. Chronic combined systolic and diastolic heart failure (HFrEF)  Overview:  A.  (TTE 2/5/25) LVEF <20% with grade 3 diastolic dysfunction   I. (TTE 3/7/23) LVEF-10 to 15%   B.  LifeVest recommended March 2023           I.   no devices currently    Orders:  -     proBNP; Standing  -     proBNP    3. Moderate to severe pulmonary hypertension  Overview:  RVSP 49 mmHg    Orders:  -     proBNP; Standing  -     proBNP    4. Valvular heart disease  Overview:  A moderate to severe MR  B.  Moderate severe TR    Orders:  -     proBNP; Standing  -     proBNP    5. Stage 3b chronic kidney disease  Overview:  Followed by -Dr. Gramajo.     Orders:  -     CBC & Differential; Standing  -     CBC & Differential  -     Hepatic Function Panel; Standing  -     Hepatic Function Panel    6. At high risk for excess fluid volume  Overview:  A.  HFrEF  B.  Pulmonary hypertension  C.  Moderate to severe valvular heart disease  D.  Stage IIIb CKD      7. Atrial fibrillation, unspecified type  Overview:  A.  Rate controlled with carvedilol  B.  Anticoagulated with warfarin sodium  C.  No history of DCCV and/or ablation identified in epic    Orders:  -     TSH; Standing  -     TSH  -     Magnesium; Standing  -     Magnesium    8. h/o Jan 24 Pulmonary embolism on long-term anticoagulation  therapy  -     Protime-INR; Standing  -     Protime-INR    9. Elevated glucose  -     Hemoglobin A1c; Standing  -     Hemoglobin A1c    10. QUINONES (dyspnea on exertion)  -     proBNP; Standing  -     proBNP  -     Basic Metabolic Panel; Standing  -     Basic Metabolic Panel  -     TSH; Standing  -     TSH  -     Hemoglobin A1c; Standing  -     Hemoglobin A1c  -     Magnesium; Standing  -     Magnesium  -     Protime-INR; Standing  -     Protime-INR  -     CBC & Differential; Standing  -     CBC & Differential  -     Hepatic Function Panel; Standing  -     Hepatic Function Panel  -     Lipid Panel; Standing  -     Lipid Panel    11. Poor compliance              Plan/discussion    Volume overload    Heart Failure Core Measures addressed    Type of Overload  multifactorial    HFrEF    Pulmonary hypertension    Moderate to severe valvular heart disease    Stage IIIb CKD    Most recent EF:   (TTE 2/5/25) LVEF <20% with grade 3 diastolic dysfunction                                       I. (TTE 3/7/23) LVEF-10 to 15%     [place] Heart association Class & Stage : II b    HF Meds Pre Visit    Beta Blocker: Coreg 3.125 mg twice daily  ARNI/ACE/ARB: None  SGLT 2 inhibitors: Jardiance 10 mg daily  Diuretics: Bumex 1 mg twice daily  Aldosterone Antagonist: Spironolactone 25 mg daily  Digitalis: N/A  Vasodilators & Nitrates: N/A    HF Meds Post Visit    Beta Blocker: Coreg 3.125 mg every 12 hours  ARNI/ACE/ARB: Lisinopril 2.5 mg daily  SGLT 2 inhibitors: Jardiance 10 mg daily  Diuretics: Bumex 1 mg daily  Furoscix: None ordered at this juncture secondary to insurance status  Aldosterone Antagonist: Spironolactone was reduced to 12.5 mg daily  Digitalis: N/A  Nitrates: Currently not indicated      Warfarin sodium as directed    Cardiac medicines reviewed with risk, benefits, and necessity of each discussed.       _________________________________________________________    Discussion    With known chronic combined systolic  diastolic heart failure with a significant diastolic grade 3 component the patient is already on heart failure core measures including beta-blocker, SGLT2, diuretic,  Aldactone   Most effective GDMT would be a ACE, SGLT2, diuretic, and Aldactone   Since he with an EGFR of only 45 would obviously prefer Dr. Gramajo his nephrologist manages diuretics however in the interim we will till he can establish a follow-up for which we are attempting to set up an appointment  He obviously needs a follow-up with Dr. Saenz   given the severity of his MR and potentially TR some consideration could be made for notification to the valvular heart clinic for possible mitral clipping versus tricuspid clipping however will defer to Dr. Saenz for the actual consult given he is his primary cardiologist  Per our protocol we will go ahead and notify the valve coordinator the patient's name and issues  Might be beneficial to see a pulmonologist regarding his pulmonary hypertension with an RVSP of 49 mmHg however will not make the consult instead defer again to Dr. Saenz  And going to continue to encourage compliance given his history of noncompliance  He is rather adamant about not wearing a LifeVest  If there is no recovery in his ejection fraction on follow-up echo in 90 days would certainly recommend ICD and would defer to EP regarding the type of ICD he needs and would allow Dr. Saenz to make the consult to EP  Needs to continue to follow-up with Dr. Gramajo his primary nephrologist  Would add typical heart failure nursing interventions including:        A. Fluid restriction at less than 2000 cc daily       B. Daily weights        C.  1 g low-sodium diet        I did the following activities:preparing for the visit, with Guanakito Styles  including reviewing tests, once pt arrived in clinic I also performed a medically appropriate examination and/or evaluation , I personally spent considerable time counseling and educating the  patient/family/caregiver, ordering medications, tests, or procedures, referring and communicating with other health care professionals , and documenting information in the medical record. I estimate including preparation 45 minutes             Subjective:     Chief Complaint   Patient presents with   • Congestive Heart Failure       ROS:  Constitutional: + weakness, + fatigue persist, No fever, rigors, chills   Eyes: No recent vision changes, eye pain   ENT/oropharynx: No recent difficulty swallowing, sore throat, epistaxis, changes in hearing   Cardiovascular: No recent chest pain, chest tightness, palpitations except as noted in HPI, paroxysmal nocturnal dyspnea, orthopnea, diaphoresis, dizziness & no pre or mark syncopal episodes   Respiratory: No significant shortness of breath at rest, + dyspnea on exertion he is unable to supply me with distance where it occurs, no significant productive cough, no wheezing and no hemoptysis   Gastrointestinal: No abdominal pain, nausea, vomiting, diarrhea, bloody stools   Genitourinary: No hematuria, dysuria other than increased frequency given the recent increase in his diuretics   Neurological: No change in typical headache, no new onset of any tremors, numbness,  or hemiparesis    Musculoskeletal: No change in typical cramps, myalgias, no new joint pain, joint swelling   Integument: No recent rash, + edema but better significantly reduced since recent hospitalization      Patient Active Problem List   Diagnosis   • Dyspnea, unspecified type   • Acute pulmonary embolism   • Pulmonary vascular congestion   • Elevated glucose   • Elevated serum creatinine   • Cardiomegaly   • Elevated troponin   • Long term (current) use of anticoagulants   • Atrial fibrillation   • Moderate to severe pulmonary hypertension   • Valvular heart disease   • Cardiomyopathy, dilated, nonischemic   • Chronic combined systolic and diastolic heart failure (HFrEF)   • Stage 3b chronic kidney disease    • At high risk for excess fluid volume   • h/o Jan 24 Pulmonary embolism on long-term anticoagulation therapy   • Poor compliance   • QUINONES (dyspnea on exertion)       No past medical history on file.    Past Surgical History:   Procedure Laterality Date   • CARDIAC CATHETERIZATION N/A 3/8/2023    Procedure: Left Heart Cath and coronary angiogram;  Surgeon: Axel Saenz MD;  Location: [facility] CATH INVASIVE LOCATION;  Service: Cardiovascular;  Laterality: N/A;   • CARDIAC CATHETERIZATION  3/8/2023    Procedure: Right and Left Heart Cath;  Surgeon: Axel Saenz MD;  Location: [facility] CATH INVASIVE LOCATION;  Service: Cardiovascular;;       Social History     Socioeconomic History   • Marital status:    Tobacco Use   • Smoking status: Former     Types: Cigarettes     Passive exposure: Past   • Smokeless tobacco: Never   Vaping Use   • Vaping status: Never Used   Substance and Sexual Activity   • Alcohol use: Yes   • Drug use: Never   • Sexual activity: Defer       Allergies   Allergen Reactions   • Albuterol Other (See Comments)     Pt reports that breathing is worse with use   • Penicillins Unknown - Low Severity         Current Outpatient Medications:   •  bumetanide (BUMEX) 1 MG tablet, Take 1 tablet by mouth 2 (Two) Times a Day for 30 days., Disp: 60 tablet, Rfl: 0  •  carvedilol (COREG) 3.125 MG tablet, Take 1 tablet by mouth 2 (Two) Times a Day With Meals for 30 days., Disp: 60 tablet, Rfl: 0  •  empagliflozin (Jardiance) 10 MG tablet tablet, Take 1 tablet by mouth Daily for 30 days., Disp: 30 tablet, Rfl: 0  •  spironolactone (ALDACTONE) 25 MG tablet, Take 1 tablet by mouth Daily for 30 days., Disp: 30 tablet, Rfl: 0  •  warfarin (COUMADIN) 3 MG tablet, TAKE 1 TABLET BY MOUTH DAILY, Disp: 30 tablet, Rfl: 2  •  warfarin (COUMADIN) 3 MG tablet, TAKE 1 TABLET BY MOUTH DAILY, Disp: 14 tablet, Rfl: 0      There is no immunization history on file for this patient.    Most recent EKG as  reviewed:  Procedures     Most recent echo as reviewed:  Results for orders placed during the hospital encounter of 02/04/25    Adult Transthoracic Echo Complete w/ Color, Spectral and Contrast if Necessary Per Protocol    Interpretation Summary  •  Left ventricular ejection fraction appears to be less than 20%.    Indications  Shortness of breath    Technically satisfactory study.  Mitral valve is structurally normal.  Tricuspid valve is structurally normal.  Aortic valve is structurally normal.  Pulmonic valve could not be well visualized.  Significant mitral and tricuspid regurgitation is present.  Significant biatrial enlargement.  Significant left ventricle enlargement and severe and diffuse hypocontractility with ejection fraction of less than 20%.  Grade 3 left ventricular diastolic dysfunction is present.  Left ventricular peak systolic global longitudinal strain is -5%.  Right ventricle is enlarged with hypocontractility.  TAPSE 1.52 cm.  Atrial septum is intact.  Aorta is normal.  IVC dilatation with decreased respiratory variation suggestive of increased right atrial pressure.  No pericardial effusion or intracardiac thrombus is seen.    Impression  Significant mitral and tricuspid regurgitation is present.  Significant biatrial enlargement.  Significant left ventricle enlargement and severe and diffuse hypocontractility with ejection fraction of less than 20%.  Grade 3 left ventricular diastolic dysfunction is present.  Left ventricular peak systolic global longitudinal strain is -5%.  Right ventricle is enlarged with hypocontractility.  TAPSE 1.52 cm.      Most recent stress test results:  Results for orders placed during the hospital encounter of 03/06/23    Stress Test With Myocardial Perfusion One Day    Interpretation Summary  Indications  Shortness of breath  Chest pain    This study was performed under the direct supervision Hemalatha TAVERAS.    Resting ECG  Sinus rhythm left bundle branch  block    Patient exercised for 3.17 minutes using the Kristopher protocol.  The maximum heart rate achieved was 110 and maximum systolic blood pressure 100.  Patient did not have any chest discomfort ST-T wave abnormalities but had premature ventricular contractions.    Cardiolite was used as an imaging agent.    Cardiolite images significantly decreased radionuclide uptake in the inferior and posterolateral segments with partial redistribution in the resting images.    Gated SPECT images revealed left ventricle enlargement with severe and diffuse hypocontractility with ejection fraction of 10%.    Impression  ========    Limited exercise tolerance.    Significantly decreased radionuclide uptake in the inferior and posterolateral segments with partial redistribution.    Gated SPECT images revealed significant left ventricle enlargement with severe and diffuse hypocontractility with ejection fraction of 10%.  Possible cardiomyopathy ischemic versus nonischemic.    Suggest clinical correlation and correlation with echocardiographic findings.      Most recent cardiac catheterization results:  Results for orders placed during the hospital encounter of 03/06/23    Cardiac Catheterization/Vascular Study    Conclusion  CARDIAC CATHETERIZATION REPORT    DATE OF PROCEDURE:  3/8/2023    INDICATION FOR PROCEDURE:  Acute systolic congestive heart failure  Cardiomyopathy  Shortness of breath  Abnormal stress Cardiolite test    PROCEDURE PERFORMED:  Right heart catheterization left heart catheterization, coronary angiography  Left ventricle angiogram was not performed due to pre-existing renal dysfunction.    @@  Moderate conscious sedation was utilized with intravenous Versed and fentanyl administered by registered nurse with continuous ECG, pulse oximetry and hemodynamic monitoring supervised by myself throughout the entire procedure.  Conscious sedation time   30 minutes    I was present with the patient for the duration of  moderate sedation and supervised staff who had no other duties and monitored the patient for the entire procedure.    @@    Under usual sterile precautions and 1% Xylocaine filtration right femoral vein and femoral artery were percutaneously punctured and a 7 and 5 Sudanese vascular sheaths were respectively inserted.  [place]iana catheter was used to measure right-sided pressures pulmonary capital wedge pressure was measured.  [place]-Eliana catheter was removed and subsequently left and right coronary arteriography was performed.  Left ventricular pressures were measured with pigtail catheter.  Left ventricle angiogram was not performed due to pre-existing renal dysfunction.  Patient tolerated the procedure well.  Hemostasis was obtained and patient was returned to the room with intact pulses.      FINDINGS:    1. HEMODYNAMICS:  Left ventricle end-diastolic pressure is normal.  Mean right atrial pressure is 8 right ventricle 42/3 pulmonary artery 47/19 mean pulmonary artery 31 and pulmonary capital wedge pressure is 19.    2. LEFT VENTRICULOGRAPHY:  Not performed due to pre-existing renal dysfunction.    3. CORONARY ANGIOGRAPHY:  Left main coronary artery is normal.  Left anterior descending artery is normal.  Circumflex coronary artery is normal.  Right coronary artery is a large and dominant vessel and is normal.    SUMMARY:  Mild pulmonary hypertension.  Left ventricle angiogram was not performed due to pre-existing renal dysfunction.  Patient has an ejection fraction of 10 to 15% (echocardiogram)  Normal epicardial coronary arteries.    RECOMMENDATIONS:  GDMT for nonischemic cardiomyopathy as tolerated.  Intensive but cautious diuresis with close observation of renal function.  Patient would benefit from LifeVest.  Modification of risk factors.  Patient would benefit from ICD if left ventricular function does not improve with GDMT.        Imaging:    Results for orders placed during the hospital encounter of  02/04/25    XR Chest 2 View    Narrative  XR CHEST 2 VW    Date of Exam: 2/4/2025 2:58 PM EST    Indication: soa    Comparison: 3/6/2023    Findings:  Cardiomegaly. Mediastinal contours within normal limits. Mild pulmonary vascular congestion. Query mild edema.    Impression  Impression:  Cardiomegaly and mild pulmonary vascular congestion.        Electronically Signed: Andrew Lennon MD  2/4/2025 3:17 PM EST  Workstation ID: [id number]       Results for orders placed during the hospital encounter of 02/04/25    US Renal Bilateral    Narrative  US RENAL BILATERAL    Date of Exam: 2/5/2025 2:58 PM EST    Indication: nato.    Comparison: No comparisons available.    Technique: Grayscale and color Doppler ultrasound evaluation of the kidneys and urinary bladder was performed.      Findings:  The right kidney measures 11.7 x 4.9 x 5.9 cm. The left kidney measures 12 x 6.5 x 5.8 cm. Corticomedullary transition maintained. No hydronephrosis. No debris in the bladder.    Impression  Impression:  No hydronephrosis or acute sonographic abnormality.            Electronically Signed: Lakhwinder Rawls MD  2/5/2025 3:11 PM EST  Workstation ID: [id number]      Results for orders placed during the hospital encounter of 03/06/23    CT Angiogram Chest Pulmonary Embolism    Narrative  CT ANGIOGRAM CHEST WITH IV CONTRAST    INDICATION: Cough    COMPARISON: None available.    PROCEDURE: Multiple axial CT images were obtained of the chest after IV administration of 100 mL of Isovue-370..  Coronal and sagittal reformations as well as MIP images were obtained.  CT dose lowering techniques were used, to include: automated  exposure control, adjustment for patient size, and or use of iterative reconstruction.    FINDINGS:    Cardiovascular: There is a solitary miniscule pulmonary embolus in a distal segmental branch of the right lower lobe propagating into a subsegmental level. It is very small. No other acute pulmonary emboli are identified. No  focal aortic aneurysm or  evidence of aortic dissection is seen.    Mediastinum/Alena: Cardiomegaly.    Lungs/Pleura :  Small pleural effusions. Lungs are clear.    Chest wall and Axilla: Unremarkable.    Bones:  No acute focal bony abnormality seen.    Upper abdomen: No focal abnormalities seen.    Impression  1. There is an extremely small acute pulmonary embolus in a far distal segmental branch of the right lower lobe propagating into a subsegmental level. It only measures a few millimeters across. No other acute pulmonary emboli are identified.  2. Very small bilateral pleural effusions.  3. Cardiomegaly    Electronically signed by:  Michele Lomeli M.D.  3/6/2023 10:29 PM Mountain Time      Historical data copied forward from previous encounters in EMR including the history, exam, and assessment/plan has been reviewed and is unchanged except as I have noted and otherwise indicated.      Objective:         /73   Pulse 86   Resp 18   Wt 98.8 kg (217 lb 12.8 oz)   SpO2 99%   BMI 31.25 kg/m²     Physical Examination    General:  Well-developed, longhaired, well-nourished, cooperative, no acute distress, appears stated age if not slightly older    Neuro:  A&O x3. No significantly obvious focal neuro deficet    Psych:  Pleasant - mildly flattened affect    HENT:  Normocephalic, atraumatic, moist mucous membranes , Normal ear placement,Throat not injected   Eyes:  PERRLA, Conjunctivae not injected, EOM's intact, conjunctiva does not appear significantly injected   Neck:  Supple, Mildly thickened, no lymph adenopathy nor thyromegaly no JVD or bruit    Lungs:    Symmetrical rise & fall of chest with baseline respiratory pattern. To auscultation lungs are Clear bilaterally, faint late phase expiratory wheezes, no rhonchi or rales are noted, bases bilaterally   Chest wall:  No significant tenderness when palpated. PMI @ 6th ICS MAL    Heart:  Regular rate and rhythm, S1 and S2 normal, no S3, there is an  obvious S4, Gr III/VI systolic ejection murmur best heard at the apex , no obvious rub, click or gallop.    Abdomen:  non-distended, non-tender, bowel sounds noted in the 4 quadrants of the abdomen, he is well without significant central abdominal adipose tissue identified    Extremities:  Equal color motion temperature and sensitivity, Rapid capillary refill noted within the nailbeds of fingers and toes bilaterally trace to 1+ lower extremity edema.    Pulses:  2+ and symmetric all extremities, rapid capillary refill    Skin:  No obvious rashes, significant lesions identified, warm dry and of normal turgor      In-Office Procedure(s):  No orders to display        Lab Review:   Hospital Outpatient Visit on 02/24/2025   Component Date Value   • proBNP 02/24/2025 5,060.0 (H)    • Glucose 02/24/2025 112 (H)    • BUN 02/24/2025 32 (H)    • Creatinine 02/24/2025 1.60 (H)    • Sodium 02/24/2025 134 (L)    • Potassium 02/24/2025 4.6    • Chloride 02/24/2025 93 (L)    • CO2 02/24/2025 26.7    • Calcium 02/24/2025 9.8    • BUN/Creatinine Ratio 02/24/2025 20.0    • Anion Gap 02/24/2025 14.3    • eGFR 02/24/2025 45.5 (L)    • TSH 02/24/2025 4.190    • Hemoglobin A1C 02/24/2025 6.11 (H)    • Magnesium 02/24/2025 2.8 (H)    • Protime 02/24/2025 19.2 (L)    • INR 02/24/2025 1.61 (L)    • Total Protein 02/24/2025 9.0 (H)    • Albumin 02/24/2025 4.4    • ALT (SGPT) 02/24/2025 62 (H)    • AST (SGOT) 02/24/2025 56 (H)    • Alkaline Phosphatase 02/24/2025 95    • Total Bilirubin 02/24/2025 0.6    • Bilirubin, Direct 02/24/2025 0.2    • Bilirubin, Indirect 02/24/2025 0.4    • Total Cholesterol 02/24/2025 199    • Triglycerides 02/24/2025 192 (H)    • HDL Cholesterol 02/24/2025 45    • LDL Cholesterol  02/24/2025 120 (H)    • VLDL Cholesterol 02/24/2025 34    • LDL/HDL Ratio 02/24/2025 2.57    • WBC 02/24/2025 9.43    • RBC 02/24/2025 5.14    • Hemoglobin 02/24/2025 14.1    • Hematocrit 02/24/2025 45.5    • MCV 02/24/2025 88.5    • MCH  "02/24/2025 27.4    • MCHC 02/24/2025 31.0 (L)    • RDW 02/24/2025 15.1    • RDW-SD 02/24/2025 49.6    • MPV 02/24/2025 11.1    • Platelets 02/24/2025 290    • Neutrophil % 02/24/2025 58.6    • Lymphocyte % 02/24/2025 28.1    • Monocyte % 02/24/2025 11.5    • Eosinophil % 02/24/2025 1.0    • Basophil % 02/24/2025 0.5    • Immature Grans % 02/24/2025 0.3    • Neutrophils, Absolute 02/24/2025 5.53    • Lymphocytes, Absolute 02/24/2025 2.65    • Monocytes, Absolute 02/24/2025 1.08 (H)    • Eosinophils, Absolute 02/24/2025 0.09    • Basophils, Absolute 02/24/2025 0.05    • Immature Grans, Absolute 02/24/2025 0.03    • nRBC 02/24/2025 0.0    Anticoagulation Visit on 02/10/2025   Component Date Value   • INR 02/10/2025 2.30 (A)    No results displayed because visit has over 200 results.        Recent labs reviewed and interpreted for clinical significance and application    I went over each of the labs individually with Guanakito while he was still here in the heart failure clinic as well as his daughter            It is a pleasure to be involved in this patient's cardiovascular care relating to their heart failure.  Please feel free to call me with any questions or concerns.    Gurpreet \"Tommy\" Bob KHALIL, [facility]  Heart failure program clinical provider    Gurpreet Rojas, [facility]   02/11/25  .  "

## 2025-02-24 NOTE — PROGRESS NOTES
[facility] HEART FAILURE CLINIC - PHARMACY SERVICE    Patient Name: Guanakito Styles  :1950  Age: 74 y.o.  Sex: male  Primary Cardiologist: REMI Storey  Nephrology: n/a  PCP: Anthony, pending medicaid     HFrEF with EF < 20% (Last Echo: 25).       ECHO:    Results for orders placed during the hospital encounter of 25    Adult Transthoracic Echo Complete w/ Color, Spectral and Contrast if Necessary Per Protocol    Interpretation Summary    Left ventricular ejection fraction appears to be less than 20%.    Indications  Shortness of breath    Technically satisfactory study.  Mitral valve is structurally normal.  Tricuspid valve is structurally normal.  Aortic valve is structurally normal.  Pulmonic valve could not be well visualized.  Significant mitral and tricuspid regurgitation is present.  Significant biatrial enlargement.  Significant left ventricle enlargement and severe and diffuse hypocontractility with ejection fraction of less than 20%.  Grade 3 left ventricular diastolic dysfunction is present.  Left ventricular peak systolic global longitudinal strain is -5%.  Right ventricle is enlarged with hypocontractility.  TAPSE 1.52 cm.  Atrial septum is intact.  Aorta is normal.  IVC dilatation with decreased respiratory variation suggestive of increased right atrial pressure.  No pericardial effusion or intracardiac thrombus is seen.    Impression  Significant mitral and tricuspid regurgitation is present.  Significant biatrial enlargement.  Significant left ventricle enlargement and severe and diffuse hypocontractility with ejection fraction of less than 20%.  Grade 3 left ventricular diastolic dysfunction is present.  Left ventricular peak systolic global longitudinal strain is -5%.  Right ventricle is enlarged with hypocontractility.  TAPSE 1.52 cm.      The patient's last EKG was reviewed from 25 and shows a QTcB of 480 ms.     ICD: no    CKD: Stage 3a eGFR 45-59  mL/min    BMP          2/7/2025    04:06 2/8/2025    00:24 2/24/2025    11:23   BMP   BUN 38  40  32    Creatinine 1.73  1.70  1.60    Sodium 138  137  134    Potassium 4.1  4.1  4.6    Chloride 98  96  93    CO2 24.7  26.3  26.7    Calcium 9.4  9.0  9.8        Lab Results   Component Value Date    EGFR 45.5 (L) 02/24/2025    EGFR 42.3 (L) 02/08/2025    EGFR 41.4 (L) 02/07/2025       Lab Results   Component Value Date    PROBNP 5,060.0 (H) 02/24/2025    PROBNP 16,707.0 (H) 02/04/2025    PROBNP 17,342.0 (H) 03/06/2023       Recent Vitals         2/9/2025 2/9/2025 2/10/2025       BP: -- -- 117/72     Pulse: -- -- 79     Temp: -- 97.5 °F (36.4 °C) --     Weight: 102 kg (225 lb 12 oz) -- 105 kg (231 lb)     BMI (Calculated): 32.4 -- 33.1              -CHF-specific BB:      Pre Visit Dose: Carvedilol 3.125 mg PO BID    Post Visit Dose: Carvedilol 3.125 mg PO BID (BP: 107/73, HR: 86)     - Target Dose: Carvedilol 25 mg PO BID (<85 kg) or 50 mg PO BID (>85 kg).     - Goal HR of 50s to 60s.     - Patient should be seen every 10 to 14 days for a pulse check with plans for up-titration until target heart rate is achieved.        -ACE/ARB/ARNI:     Pre Visit Dose: None due to Renal    Post Visit Dose: Lisinopril 2.5 mg PO daily           -SGLT2 inhibitor therapy:    Pre Visit Dose: Empagliflozin 10 mg PO daily    Post Visit Dose: Empagliflozin 10 mg PO daily    - Target Dose: Empagliflozin 10 mg PO daily : CrCl > 20 mL/min which has shown benefit in patients with HF    -DIURETIC:     Pre Visit Dose: Bumetanide 1 mg BID    Post Visit Dose: Bumetanide 1 mg BID     -MRA:     Pre Visit Dose: Spironolactone 25 mg daily    Post Visit Dose: Spironolactone 12.5 mg daily      - K is < 5 mEq/L and SCr is </= 2.5 mg/dL in male and eGFR > 30 mL/min/1.73m3    Lab Results   Component Value Date    K 4.6 02/24/2025       Lab Results   Component Value Date    CREATININE 1.60 (H) 02/24/2025         -MAGNESIUM:     Mag is > 2 mg/dL    Lab  Results   Component Value Date    MG 2.8 (H) 02/24/2025    MG 2.2 02/07/2025    MG 2.1 02/06/2025       Pre Visit Dose: None    Post Visit Dose: None      -ANTICOAGULATION:   Goal:  2-3 , INR today is 1.6.  Has not missed any doses in past 7 days.  Eaten more cabbage this past week.  No other change in diet or activity.  Sent a secure chat to Kary Damian at [facility] office.      warfarin 3 mg PO daily Current total weekly dose = 21 mg per week.       PLAN: Increase to warfarin 3 mg daily except for 4.5 mg on Monday and Fridays and call [facility] office with questions.      -OTHER CV MEDS:     Pre Visit Dose: none    Post Visit Dose: none    -Clinic Administered Medications:     None         Patient Assistance:      Pending Medicaid  - Interview 2/26/25 with medicaid office  #14 samples provided of Jardiance 10 mg pending Medicaid approval.  Has 12 days left           PLAN:  Initiation/Discontinuation/Dose Adjustment: Decrease spironolactone and initiate lisinopril 2.5 mg daily  Education provided: Jardiance samples and 4 pillar handout, and educated on holding parameters  Coordination of Care: referral to renal - Dr. Castellanos and see in 3 weeks since patient has scheduled cardiology appt in 2 weeks  Refills: spironolactone, bumetanide, and carvedilol      Thank you for allowing me to participate in the care of your patient.    Artur Coronel, PharmD  [facility] Heart Failure Clinic  02/24/25  10:55 EST

## 2025-02-25 RX ORDER — BUMETANIDE 1 MG/1
1 TABLET ORAL 2 TIMES DAILY
Qty: 180 TABLET | Refills: 2 | Status: SHIPPED | OUTPATIENT
Start: 2025-02-25

## 2025-02-25 NOTE — ADDENDUM NOTE
Encounter addended by: Jessica Coronel, PharmD on: 2/25/2025 7:01 AM   Actions taken: Order list changed

## 2025-03-01 LAB
QT INTERVAL: 390 MS
QTC INTERVAL: 480 MS

## 2025-03-03 ENCOUNTER — TELEPHONE (OUTPATIENT)
Facility: HOSPITAL | Age: 75
End: 2025-03-03
Payer: MEDICAID

## 2025-03-03 NOTE — TELEPHONE ENCOUNTER
Heart Failure Clinic: [facility]  Clinical Outreach     Guanakito Styles is a 74 y.o. male and is a patient of the [facility] heart failure clinic.     Patient's daughter, Maria G called into HF clinic to review her dad's medications to make sure she understood the changes.  I discussed his medication changes and holding parameters. Maria G stated she understood.  She also stated she has her medicaid interview and all paperwork has been submitted.  Instructed her to contact HF clinic back if patient gets close to running out of Jardiance and we can see if we have any samples to help bridge the gap pending medicaid approval.      Artur Coronel, PharmD  Ambulatory Care Clinical Pharmacist  3/3/2025  11:28 EST

## 2025-03-05 ENCOUNTER — TELEPHONE (OUTPATIENT)
Dept: CARDIOLOGY | Facility: CLINIC | Age: 75
End: 2025-03-05
Payer: MEDICAID

## 2025-03-05 ENCOUNTER — TELEPHONE (OUTPATIENT)
Facility: HOSPITAL | Age: 75
End: 2025-03-05
Payer: MEDICAID

## 2025-03-05 NOTE — TELEPHONE ENCOUNTER
Heart Failure Clinic: [facility]  Clinical Outreach     Guanakito Styles is a 74 y.o. male and is a patient of the [facility] heart failure clinic.     Patient's daughter, Maria G, called and said [facility] said they couldn't fill the spironolactone and the Bumex and Coreg scripts have been cancelled.      Called [facility] in [place] and pharmacy technician stated the spironolactone is on order and will be able to fill tomorrow.  They pushed through the Coreg so they will fill it and it will be ready tomorrow with spironolactone, and they claimed the Bumex script was not received even though [facility] said they received it.  Re- sent Bumex Script.      Called Maria G back to let her know.  She understood.      Artur Coronel, PharmD  Ambulatory Care Clinical Pharmacist  3/5/2025  14:57 EST

## 2025-03-05 NOTE — TELEPHONE ENCOUNTER
Caller: [facility]    Patient is needing: CALLING FOR THE LAST INR OFFICE NOTE. PLEASE FAX [phone]

## 2025-03-10 PROBLEM — I26.99 ACUTE PULMONARY EMBOLISM: Status: RESOLVED | Noted: 2023-03-07 | Resolved: 2025-03-10

## 2025-03-10 PROBLEM — R06.00 DYSPNEA, UNSPECIFIED TYPE: Status: RESOLVED | Noted: 2023-03-07 | Resolved: 2025-03-10

## 2025-03-10 PROBLEM — R09.89 PULMONARY VASCULAR CONGESTION: Status: RESOLVED | Noted: 2023-03-07 | Resolved: 2025-03-10

## 2025-03-10 PROBLEM — R79.89 ELEVATED SERUM CREATININE: Status: RESOLVED | Noted: 2023-03-07 | Resolved: 2025-03-10

## 2025-03-10 PROBLEM — R73.09 ELEVATED GLUCOSE: Status: RESOLVED | Noted: 2023-03-07 | Resolved: 2025-03-10

## 2025-03-14 NOTE — PROGRESS NOTES
"Cardiology Heart Failure Clinic Note  Gurpreet \"Tommy\" Bob KHALIL [facility]    Patient ID: Guanakito Styles  is a 74 y.o. male.    Encounter Date:03/17/2025    HPI:  74-year-old male patient of Dr. Sears with a significant past medical history including being diagnosed apparently with nonischemic dilated cardiomyopathy couple years ago he has both chronic combined systolic and diastolic heart failure (HFrEF, his LVIDD is very large at 7 cm, (TTE 2/5/2025) LVEF less than 20% with grade 3 diastolic dysfunction, previously was supposed to have a LifeVest which he has been noncompliant with for well over a year now, additionally known to have valvular heart disease with moderate MR and TR, as well as moderate to severe pulmonary hypertension with his RVSP around 49 mmHg, he also suffers from stage III chronic kidney disease nearing stage IV, history of atrial fibrillation anticoagulated with Coumadin and rate controlled with Coreg, history of January 2024 having a pulmonary embolism, elevated blood glucose, recently hospitalized on February 4 complaining of dyspnea and bilateral lower extremity edema and ran out of diuretic and anticoagulant at home for quite some time proBNP was at 16,000 chest x-ray of course showed pulmonary vascular congestion repeat TTE showed an EF of about 20% evaluated by nephrology since his CKD had worsened who was agreeable with the addition of Jardiance and ACE or ARB to be started as an outpatient.  Comes in for his initial heart failure visit on 12 February 2025 since recently hospitalized he has not had any excessive swelling in his lower extremities infected continues to go down his breathing has improved somewhat was back for second visit on 17 March 2025 since initial visit            Assessment:   Diagnoses and all orders for this visit:    1. Cardiomyopathy, dilated, nonischemic (Primary)  Overview:  A.  LVIDD 7 cm  B.  Chronic combined systolic and diastolic heart failure (HFrEF)  C.  (TTE " 2/5/25) LVEF <20% with grade 3 diastolic dysfunction  D.  LifeVest recommended March 23 no devices currently      2. Chronic combined systolic and diastolic heart failure (HFrEF)  Overview:  A.  (TTE 2/5/25) LVEF <20% with grade 3 diastolic dysfunction   I. (TTE 3/7/23) LVEF-10 to 15%   B.  LifeVest recommended March 2023           I.   no devices currently      3. Moderate to severe pulmonary hypertension  Overview:  RVSP 49 mmHg      4. Valvular heart disease  Overview:  A moderate to severe MR  B.  Moderate severe TR      5. Stage 3b chronic kidney disease  Overview:  Followed by -Dr. Gramajo.       6. At high risk for excess fluid volume  Overview:  A.  HFrEF  B.  Pulmonary hypertension  C.  Moderate to severe valvular heart disease  D.  Stage IIIb CKD      7. h/o Jan 24 Pulmonary embolism on long-term anticoagulation therapy    8. Poor compliance    9. QUINONES (dyspnea on exertion)              Plan/discussion    Volume overload    Heart Failure Core Measures    Type of Overload : multifactorial    HFrEF    Pulmonary hypertension    Moderate to severe valvular heart disease    Stage IIIb CKD     Most recent EF:   (TTE 2/5/25) LVEF <20% with grade 3 diastolic dysfunction                                                  I. (TTE 3/7/23) LVEF-10 to 15%      [place] Heart association Class & Stage : II b    HF Meds     Beta Blocker: Coreg 3.125 mg every 12 hours  ARNI/ACE/ARB: Lisinopril 2.5 mg daily  SGLT 2 inhibitors: Jardiance 10 mg daily  Diuretics: Bumex 1 mg daily  Furoscix: None ordered at this juncture secondary to insurance status  Aldosterone Antagonist: Spironolactone was reduced to 12.5 mg daily  Digitalis: N/A  Nitrates: Currently not indicated        Warfarin sodium as directed     Cardiac medicines reviewed with risk, benefits, and necessity of each discussed.  Myself and an [facility]         _________________________________________________________     Discussion     With known chronic combined systolic diastolic  heart failure with a significant diastolic grade 3 component the patient is already on heart failure core measures including beta-blocker, SGLT2, diuretic,  Aldactone   Most effective GDMT would be a ACE, SGLT2, diuretic, and Aldactone   Since he with an EGFR of only 45 would obviously prefer Dr. Gramajo his nephrologist manages diuretics however in the interim we will till he can establish a follow-up for which we are attempting to set up an appointment  He obviously needs a follow-up with Dr. Saenz   given the severity of his MR and potentially TR some consideration could be made for notification to the valvular heart clinic for possible mitral clipping versus tricuspid clipping however will defer to Dr. Saenz for the actual consult given he is his primary cardiologist  Per our protocol we will go ahead and notify the valve coordinator the patient's name and issues  Might be beneficial to see a pulmonologist regarding his pulmonary hypertension with an RVSP of 49 mmHg however will not make the consult instead defer again to Dr. Saenz  And going to continue to encourage compliance given his history of noncompliance  He is rather adamant about not wearing a LifeVest  If there is no recovery in his ejection fraction on follow-up echo in 90 days would certainly recommend ICD and would defer to EP regarding the type of ICD he needs and would allow Dr. Saenz to make the consult to EP  Needs to continue to follow-up with Dr. Gramajo his primary nephrologist  Would add typical heart failure nursing interventions including:                          A. Fluid restriction at less than 2000 cc daily                          B. Daily weights                          C.  1 g low-sodium diet        I did the following activities preparing for the visit with Guanakito Styles  including: reviewing tests, once pt arrived in clinic I also performed a medically appropriate examination and/or evaluation, I personally spent considerable time  counseling and educating the patient/family/caregiver, ordering medications, tests, or procedures, referring and communicating with other health care professionals, and documenting information in the medical record. I estimate including preparation *** minutes              Subjective:     No chief complaint on file.      ROS:    Constitutional: + weakness, + fatigue persist, No fever, rigors, chills   Eyes: No recent vision changes, eye pain   ENT/oropharynx: No recent difficulty swallowing, sore throat, epistaxis, changes in hearing   Cardiovascular: No recent chest pain, chest tightness, palpitations except as noted in HPI, paroxysmal nocturnal dyspnea, orthopnea, diaphoresis, dizziness & no pre or mark syncopal episodes   Respiratory: No significant shortness of breath at rest, + dyspnea on exertion he is unable to supply me with distance where it occurs, no significant productive cough, no wheezing and no hemoptysis   Gastrointestinal: No abdominal pain, nausea, vomiting, diarrhea, bloody stools   Genitourinary: No hematuria, dysuria other than increased frequency given the recent increase in his diuretics   Neurological: No change in typical headache, no new onset of any tremors, numbness,  or hemiparesis    Musculoskeletal: No change in typical cramps, myalgias, no new joint pain, joint swelling   Integument: No recent rash, + edema but better significantly reduced since recent hospitalization      Patient Active Problem List   Diagnosis    Cardiomegaly    Elevated troponin    Long term (current) use of anticoagulants    Moderate to severe pulmonary hypertension    Valvular heart disease    Cardiomyopathy, dilated, nonischemic    Chronic combined systolic and diastolic heart failure (HFrEF)    Stage 3b chronic kidney disease    At high risk for excess fluid volume    h/o Jan 24 Pulmonary embolism on long-term anticoagulation therapy    Poor compliance    QUINONES (dyspnea on exertion)       Past Medical History:    Diagnosis Date    Acute pulmonary embolism 03/07/2023       Past Surgical History:   Procedure Laterality Date    CARDIAC CATHETERIZATION N/A 3/8/2023    Procedure: Left Heart Cath and coronary angiogram;  Surgeon: Axel Saenz MD;  Location:  [facility];  Service: Cardiovascular;  Laterality: N/A;    CARDIAC CATHETERIZATION  3/8/2023    Procedure: Right and Left Heart Cath;  Surgeon: Axel Saenz MD;  Location:  [facility];  Service: Cardiovascular;;       Social History     Socioeconomic History    Marital status:    Tobacco Use    Smoking status: Former     Types: Cigarettes     Passive exposure: Past    Smokeless tobacco: Never   Vaping Use    Vaping status: Never Used   Substance and Sexual Activity    Alcohol use: Yes    Drug use: Never    Sexual activity: Defer       Allergies   Allergen Reactions    Albuterol Other (See Comments)     Pt reports that breathing is worse with use    Penicillins Unknown - Low Severity         Current Outpatient Medications:     bumetanide (Bumex) 1 MG tablet, Take 1 tablet by mouth 2 (Two) Times a Day., Disp: 180 tablet, Rfl: 2    carvedilol (Coreg) 3.125 MG tablet, Take 1 tablet by mouth 2 (Two) Times a Day With Meals. Skip or Hold dose if Systolic BP < 100 mmHg or HR < 60 BPM unless you have a Pacemaker or ICD, Disp: 180 tablet, Rfl: 2    empagliflozin (Jardiance) 10 MG tablet tablet, Take 1 tablet by mouth Daily for 30 days., Disp: 30 tablet, Rfl: 0    lisinopril (PRINIVIL,ZESTRIL) 5 MG tablet, Take 0.5 tablets by mouth Daily. Hold or skip if systolic BP < 100 mmHg, Disp: 90 tablet, Rfl: 2    spironolactone (ALDACTONE) 25 MG tablet, Take 0.5 tablets by mouth Daily., Disp: 45 tablet, Rfl: 2    warfarin (COUMADIN) 3 MG tablet, TAKE 1 TABLET BY MOUTH DAILY, Disp: 30 tablet, Rfl: 2    warfarin (COUMADIN) 3 MG tablet, TAKE 1 TABLET BY MOUTH DAILY, Disp: 14 tablet, Rfl: 0      There is no immunization history on file for this  patient.      Most recent EKG as reviewed:  Procedures     Most recent echo as reviewed:  Results for orders placed during the hospital encounter of 02/04/25    Adult Transthoracic Echo Complete w/ Color, Spectral and Contrast if Necessary Per Protocol    Interpretation Summary    Left ventricular ejection fraction appears to be less than 20%.    Indications  Shortness of breath    Technically satisfactory study.  Mitral valve is structurally normal.  Tricuspid valve is structurally normal.  Aortic valve is structurally normal.  Pulmonic valve could not be well visualized.  Significant mitral and tricuspid regurgitation is present.  Significant biatrial enlargement.  Significant left ventricle enlargement and severe and diffuse hypocontractility with ejection fraction of less than 20%.  Grade 3 left ventricular diastolic dysfunction is present.  Left ventricular peak systolic global longitudinal strain is -5%.  Right ventricle is enlarged with hypocontractility.  TAPSE 1.52 cm.  Atrial septum is intact.  Aorta is normal.  IVC dilatation with decreased respiratory variation suggestive of increased right atrial pressure.  No pericardial effusion or intracardiac thrombus is seen.    Impression  Significant mitral and tricuspid regurgitation is present.  Significant biatrial enlargement.  Significant left ventricle enlargement and severe and diffuse hypocontractility with ejection fraction of less than 20%.  Grade 3 left ventricular diastolic dysfunction is present.  Left ventricular peak systolic global longitudinal strain is -5%.  Right ventricle is enlarged with hypocontractility.  TAPSE 1.52 cm.      Most recent stress test results:  Results for orders placed during the hospital encounter of 03/06/23    Stress Test With Myocardial Perfusion One Day    Interpretation Summary  Indications  Shortness of breath  Chest pain    This study was performed under the direct supervision Hemalatha TAVERAS.    Resting ECG  Sinus rhythm  left bundle branch block    Patient exercised for 3.17 minutes using the Kristopher protocol.  The maximum heart rate achieved was 110 and maximum systolic blood pressure 100.  Patient did not have any chest discomfort ST-T wave abnormalities but had premature ventricular contractions.    Cardiolite was used as an imaging agent.    Cardiolite images significantly decreased radionuclide uptake in the inferior and posterolateral segments with partial redistribution in the resting images.    Gated SPECT images revealed left ventricle enlargement with severe and diffuse hypocontractility with ejection fraction of 10%.    Impression  ========    Limited exercise tolerance.    Significantly decreased radionuclide uptake in the inferior and posterolateral segments with partial redistribution.    Gated SPECT images revealed significant left ventricle enlargement with severe and diffuse hypocontractility with ejection fraction of 10%.  Possible cardiomyopathy ischemic versus nonischemic.    Suggest clinical correlation and correlation with echocardiographic findings.      Most recent cardiac catheterization results:  Results for orders placed during the hospital encounter of 03/06/23    Cardiac Catheterization/Vascular Study    Conclusion  CARDIAC CATHETERIZATION REPORT    DATE OF PROCEDURE:  3/8/2023    INDICATION FOR PROCEDURE:  Acute systolic congestive heart failure  Cardiomyopathy  Shortness of breath  Abnormal stress Cardiolite test    PROCEDURE PERFORMED:  Right heart catheterization left heart catheterization, coronary angiography  Left ventricle angiogram was not performed due to pre-existing renal dysfunction.    @@  Moderate conscious sedation was utilized with intravenous Versed and fentanyl administered by registered nurse with continuous ECG, pulse oximetry and hemodynamic monitoring supervised by myself throughout the entire procedure.  Conscious sedation time   30 minutes    I was present with the patient for the  duration of moderate sedation and supervised staff who had no other duties and monitored the patient for the entire procedure.    @@    Under usual sterile precautions and 1% Xylocaine filtration right femoral vein and femoral artery were percutaneously punctured and a 7 and 5 Maltese vascular sheaths were respectively inserted.  Partlow-Eliana catheter was used to measure right-sided pressures pulmonary capital wedge pressure was measured.  Partlow-Eliana catheter was removed and subsequently left and right coronary arteriography was performed.  Left ventricular pressures were measured with pigtail catheter.  Left ventricle angiogram was not performed due to pre-existing renal dysfunction.  Patient tolerated the procedure well.  Hemostasis was obtained and patient was returned to the room with intact pulses.      FINDINGS:    1. HEMODYNAMICS:  Left ventricle end-diastolic pressure is normal.  Mean right atrial pressure is 8 right ventricle 42/3 pulmonary artery 47/19 mean pulmonary artery 31 and pulmonary capital wedge pressure is 19.    2. LEFT VENTRICULOGRAPHY:  Not performed due to pre-existing renal dysfunction.    3. CORONARY ANGIOGRAPHY:  Left main coronary artery is normal.  Left anterior descending artery is normal.  Circumflex coronary artery is normal.  Right coronary artery is a large and dominant vessel and is normal.    SUMMARY:  Mild pulmonary hypertension.  Left ventricle angiogram was not performed due to pre-existing renal dysfunction.  Patient has an ejection fraction of 10 to 15% (echocardiogram)  Normal epicardial coronary arteries.    RECOMMENDATIONS:  GDMT for nonischemic cardiomyopathy as tolerated.  Intensive but cautious diuresis with close observation of renal function.  Patient would benefit from LifeVest.  Modification of risk factors.  Patient would benefit from ICD if left ventricular function does not improve with GDMT.        Historical data copied forward from previous encounters in EMR  including the history, exam, and assessment/plan has been reviewed and is unchanged except as I have noted and otherwise indicated.      Objective:         There were no vitals taken for this visit.    General:  Well-developed, longhaired, well-nourished, cooperative, no acute distress, appears stated age if not slightly older    Neuro:  A&O x3. No significantly obvious focal neuro deficet    Psych:  Pleasant - mildly flattened affect    HENT:  Normocephalic, atraumatic, moist mucous membranes , Normal ear placement,Throat not injected   Eyes:  PERRLA, Conjunctivae not injected, EOM's intact, conjunctiva does not appear significantly injected   Neck:  Supple, Mildly thickened, no lymph adenopathy nor thyromegaly no JVD or bruit    Lungs:    Symmetrical rise & fall of chest with baseline respiratory pattern. To auscultation lungs are Clear bilaterally, faint late phase expiratory wheezes, no rhonchi or rales are noted, bases bilaterally   Chest wall:  No significant tenderness when palpated. PMI @ 6th ICS MAL    Heart:  Regular rate and rhythm, S1 and S2 normal, no S3, there is an obvious S4, Gr III/VI systolic ejection murmur best heard at the apex , no obvious rub, click or gallop.    Abdomen:  non-distended, non-tender, bowel sounds noted in the 4 quadrants of the abdomen, he is well without significant central abdominal adipose tissue identified    Extremities:  Equal color motion temperature and sensitivity, Rapid capillary refill noted within the nailbeds of fingers and toes bilaterally trace to 1+ lower extremity edema.    Pulses:  2+ and symmetric all extremities, rapid capillary refill    Skin:  No obvious rashes, significant lesions identified, warm dry and of normal turgor      In-Office Procedure(s):  No orders to display        Imaging:    Results for orders placed during the hospital encounter of 02/04/25    XR Chest 2 View    Narrative  XR CHEST 2 VW    Date of Exam: 2/4/2025 2:58 PM EST    Indication:  soa    Comparison: 3/6/2023    Findings:  Cardiomegaly. Mediastinal contours within normal limits. Mild pulmonary vascular congestion. Query mild edema.    Impression  Impression:  Cardiomegaly and mild pulmonary vascular congestion.        Electronically Signed: Andrew Lennon MD  2/4/2025 3:17 PM EST  Workstation ID: [id number]       Results for orders placed during the hospital encounter of 02/04/25    US Renal Bilateral    Narrative  US RENAL BILATERAL    Date of Exam: 2/5/2025 2:58 PM EST    Indication: nato.    Comparison: No comparisons available.    Technique: Grayscale and color Doppler ultrasound evaluation of the kidneys and urinary bladder was performed.      Findings:  The right kidney measures 11.7 x 4.9 x 5.9 cm. The left kidney measures 12 x 6.5 x 5.8 cm. Corticomedullary transition maintained. No hydronephrosis. No debris in the bladder.    Impression  Impression:  No hydronephrosis or acute sonographic abnormality.            Electronically Signed: Lakhwinder Rawls MD  2/5/2025 3:11 PM EST  Workstation ID: [id number]      Results for orders placed during the hospital encounter of 03/06/23    CT Angiogram Chest Pulmonary Embolism    Narrative  CT ANGIOGRAM CHEST WITH IV CONTRAST    INDICATION: Cough    COMPARISON: None available.    PROCEDURE: Multiple axial CT images were obtained of the chest after IV administration of 100 mL of Isovue-370..  Coronal and sagittal reformations as well as MIP images were obtained.  CT dose lowering techniques were used, to include: automated  exposure control, adjustment for patient size, and or use of iterative reconstruction.    FINDINGS:    Cardiovascular: There is a solitary miniscule pulmonary embolus in a distal segmental branch of the right lower lobe propagating into a subsegmental level. It is very small. No other acute pulmonary emboli are identified. No focal aortic aneurysm or  evidence of aortic dissection is seen.    Mediastinum/Alena:  Cardiomegaly.    Lungs/Pleura :  Small pleural effusions. Lungs are clear.    Chest wall and Axilla: Unremarkable.    Bones:  No acute focal bony abnormality seen.    Upper abdomen: No focal abnormalities seen.    Impression  1. There is an extremely small acute pulmonary embolus in a far distal segmental branch of the right lower lobe propagating into a subsegmental level. It only measures a few millimeters across. No other acute pulmonary emboli are identified.  2. Very small bilateral pleural effusions.  3. Cardiomegaly    Electronically signed by:  Michele Lomeli M.D.  3/6/2023 10:29 PM Mountain Time      Lab Review:   Hospital Outpatient Visit on 02/24/2025   Component Date Value    proBNP 02/24/2025 5,060.0 (H)     Glucose 02/24/2025 112 (H)     BUN 02/24/2025 32 (H)     Creatinine 02/24/2025 1.60 (H)     Sodium 02/24/2025 134 (L)     Potassium 02/24/2025 4.6     Chloride 02/24/2025 93 (L)     CO2 02/24/2025 26.7     Calcium 02/24/2025 9.8     BUN/Creatinine Ratio 02/24/2025 20.0     Anion Gap 02/24/2025 14.3     eGFR 02/24/2025 45.5 (L)     TSH 02/24/2025 4.190     Hemoglobin A1C 02/24/2025 6.11 (H)     Magnesium 02/24/2025 2.8 (H)     Protime 02/24/2025 19.2 (L)     INR 02/24/2025 1.61 (L)     Total Protein 02/24/2025 9.0 (H)     Albumin 02/24/2025 4.4     ALT (SGPT) 02/24/2025 62 (H)     AST (SGOT) 02/24/2025 56 (H)     Alkaline Phosphatase 02/24/2025 95     Total Bilirubin 02/24/2025 0.6     Bilirubin, Direct 02/24/2025 0.2     Bilirubin, Indirect 02/24/2025 0.4     Total Cholesterol 02/24/2025 199     Triglycerides 02/24/2025 192 (H)     HDL Cholesterol 02/24/2025 45     LDL Cholesterol  02/24/2025 120 (H)     VLDL Cholesterol 02/24/2025 34     LDL/HDL Ratio 02/24/2025 2.57     WBC 02/24/2025 9.43     RBC 02/24/2025 5.14     Hemoglobin 02/24/2025 14.1     Hematocrit 02/24/2025 45.5     MCV 02/24/2025 88.5     MCH 02/24/2025 27.4     MCHC 02/24/2025 31.0 (L)     RDW 02/24/2025 15.1     RDW-SD  "02/24/2025 49.6     MPV 02/24/2025 11.1     Platelets 02/24/2025 290     Neutrophil % 02/24/2025 58.6     Lymphocyte % 02/24/2025 28.1     Monocyte % 02/24/2025 11.5     Eosinophil % 02/24/2025 1.0     Basophil % 02/24/2025 0.5     Immature Grans % 02/24/2025 0.3     Neutrophils, Absolute 02/24/2025 5.53     Lymphocytes, Absolute 02/24/2025 2.65     Monocytes, Absolute 02/24/2025 1.08 (H)     Eosinophils, Absolute 02/24/2025 0.09     Basophils, Absolute 02/24/2025 0.05     Immature Grans, Absolute 02/24/2025 0.03     nRBC 02/24/2025 0.0    Anticoagulation Visit on 02/10/2025   Component Date Value    INR 02/10/2025 2.30 (A)    No results displayed because visit has over 200 results.        Recent labs reviewed and interpreted for clinical significance and application    ***          It is a pleasure to be involved in this patient's cardiovascular care relating to their heart failure.  Please feel free to call me with any questions or concerns.    Gurpreet \"Tommy\" Bob KHALIL, [facility]  Heart failure program clinical provider    REMI Person   03/14/25  .  "

## 2025-03-17 ENCOUNTER — HOSPITAL ENCOUNTER (OUTPATIENT)
Facility: HOSPITAL | Age: 75
Discharge: HOME | End: 2025-03-17
Payer: MEDICAID

## 2025-03-17 DIAGNOSIS — I27.20 PULMONARY HYPERTENSION: Chronic | ICD-10-CM

## 2025-03-17 DIAGNOSIS — Z91.89 AT HIGH RISK FOR EXCESS FLUID VOLUME: ICD-10-CM

## 2025-03-17 DIAGNOSIS — Z91.199 POOR COMPLIANCE: ICD-10-CM

## 2025-03-17 DIAGNOSIS — Z79.01 LONG TERM (CURRENT) USE OF ANTICOAGULANTS: ICD-10-CM

## 2025-03-17 DIAGNOSIS — I50.42 CHRONIC COMBINED SYSTOLIC AND DIASTOLIC HEART FAILURE: Chronic | ICD-10-CM

## 2025-03-17 DIAGNOSIS — N18.32 STAGE 3B CHRONIC KIDNEY DISEASE: Chronic | ICD-10-CM

## 2025-03-17 DIAGNOSIS — R06.09 DOE (DYSPNEA ON EXERTION): ICD-10-CM

## 2025-03-17 DIAGNOSIS — I42.0 CARDIOMYOPATHY, DILATED, NONISCHEMIC: Primary | Chronic | ICD-10-CM

## 2025-03-17 DIAGNOSIS — I38 VALVULAR HEART DISEASE: Chronic | ICD-10-CM

## 2025-03-17 DIAGNOSIS — I26.99 PULMONARY EMBOLISM ON LONG-TERM ANTICOAGULATION THERAPY: Chronic | ICD-10-CM

## 2025-03-17 DIAGNOSIS — Z79.01 PULMONARY EMBOLISM ON LONG-TERM ANTICOAGULATION THERAPY: Chronic | ICD-10-CM

## 2025-03-26 ENCOUNTER — TELEPHONE (OUTPATIENT)
Facility: HOSPITAL | Age: 75
End: 2025-03-26
Payer: MEDICARE

## 2025-03-26 NOTE — PROGRESS NOTES
"Cardiology Heart Failure Clinic Note  Gurpreet \"Tommy\" Bob KHALIL [facility]    Patient ID: Guanakito Styles  is a 74 y.o. male.    Encounter Date:04/01/2025    HPI:  74-year-old male patient of Dr. Sears with a significant past medical history including being diagnosed apparently with nonischemic dilated cardiomyopathy couple years ago he has both chronic combined systolic and diastolic heart failure (HFrEF, his LVIDD is very large at 7 cm, (TTE 2/5/2025) LVEF less than 20% with grade 3 diastolic dysfunction, previously was supposed to have a LifeVest which he has been noncompliant with for well over a year now, additionally known to have valvular heart disease with moderate MR and TR, as well as moderate to severe pulmonary hypertension with his RVSP around 49 mmHg, he also suffers from stage III chronic kidney disease nearing stage IV, history of atrial fibrillation anticoagulated with Coumadin and rate controlled with Coreg, history of January 2024 having a pulmonary embolism, elevated blood glucose, recently hospitalized on February 4 complaining of dyspnea and bilateral lower extremity edema and ran out of diuretic and anticoagulant at home for quite some time proBNP was at 16,000 chest x-ray of course showed pulmonary vascular congestion repeat TTE showed an EF of about 20% evaluated by nephrology since his CKD had worsened who was agreeable with the addition of Jardiance and ACE or ARB to be started as an outpatient.  Comes in for his initial heart failure visit on 12 February 2025 since recently hospitalized he has not had any excessive swelling in his lower extremities infected continues to go down his breathing has improved somewhat was back for second visit on 1 April 2025 since initial visit is not noted any real improvements in either breathing fatigue etc.           Assessment:   Diagnoses and all orders for this visit:    1. Cardiomyopathy, dilated, nonischemic (Primary)  Overview:  A.  LVIDD 7 cm  B.  " Chronic combined systolic and diastolic heart failure (HFrEF)  C.  (TTE 2/5/25) LVEF <20% with grade 3 diastolic dysfunction  D.  LifeVest recommended March 23 no devices currently      2. Chronic combined systolic and diastolic heart failure (HFrEF)  Overview:  A.  (TTE 2/5/25) LVEF <20% with grade 3 diastolic dysfunction   I. (TTE 3/7/23) LVEF-10 to 15%   B.  LifeVest recommended March 2023           I.   no devices currently      3. Moderate to severe pulmonary hypertension  Overview:  RVSP 49 mmHg      4. Valvular heart disease  Overview:  A moderate to severe MR  B.  Moderate severe TR      5. Stage 3b chronic kidney disease  Overview:  Followed by -Dr. Gramajo.       6. At high risk for excess fluid volume  Overview:  A.  HFrEF  B.  Pulmonary hypertension  C.  Moderate to severe valvular heart disease  D.  Stage IIIb CKD      7. h/o Jan 24 Pulmonary embolism on long-term anticoagulation therapy    8. QUINONES (dyspnea on exertion)    9. Poor compliance    10. Long term (current) use of anticoagulants    Other orders  -     metoprolol succinate XL (TOPROL-XL) 25 MG 24 hr tablet; Take 0.5 tablets by mouth Every 12 (Twelve) Hours. Skip or hold dose for systolic BP < 100 mmHg or HR < 60 BPM unless you have pacemaker & or ICD  Dispense: 90 tablet; Refill: 3              Plan/discussion    Volume overload    Heart Failure Core Measures    Type of Overload : multifactorial    HFrEF    Pulmonary hypertension    Moderate to severe valvular heart disease    Stage IIIb CKD     Most recent EF:   (TTE 2/5/25) LVEF <20% with grade 3 diastolic dysfunction                                                  I. (TTE 3/7/23) LVEF-10 to 15%      [place] Heart association Class & Stage : II b      ICD Qualification  Repeat echo April 2025 (p)   Below filled out on initial echo and visit    [x] GDMT X 3 months  [x] EF < 35%  [] QRS Duration  [x] > 150  [] LBBB  [] Atrial Fibrillation  [x] NYHA Class 2b  [x] Life expectancy > 1 year       HF  Meds     Beta Blocker: Coreg 3.125 mg every 12 hours  ARNI/ACE/ARB: Lisinopril 2.5 mg daily  SGLT 2 inhibitors: Jardiance 10 mg daily  Diuretics: Bumex 1 mg daily  Furoscix: None ordered at this juncture secondary to insurance status  Aldosterone Antagonist: Spironolactone was reduced to 12.5 mg daily  Digitalis: N/A  Nitrates: Currently not indicated        Warfarin sodium as directed     Cardiac medicines reviewed with risk, benefits, and necessity of each discussed.  Myself and an h         _________________________________________________________     Discussion     With known chronic combined systolic diastolic heart failure with a significant diastolic grade 3 component the patient is already on heart failure core measures including beta-blocker, SGLT2, diuretic,  Aldactone   Most effective GDMT would be a ACE, SGLT2, diuretic, and Aldactone  4/1/2025 go ahead and change his Coreg to metoprolol will give 12.5 twice daily with parameters to hold for systolic BP of < 100 and/or heart rate < 60  Since he with an EGFR of previously only 45 would obviously prefer Dr. Gramajo his nephrologist manages diuretics he said 1 follow-up appointment with him  A.  Since last visit there has been some improvement in his EGFR is actually gone up about 4/1/2025  to 56  He obviously needs to continue his follow-up with Dr. Saenz next being seen in July  given the severity of his MR and potentially TR some consideration could be made for notification to the valvular heart clinic for possible mitral clipping versus tricuspid clipping however will defer to Dr. Saenz for the actual consult given he is his primary cardiologist  Per our protocol we will go ahead and notify the valve coordinator the patient's name and issues  Might be beneficial to see a pulmonologist regarding his pulmonary hypertension with an RVSP of 49 mmHg however will not make the consult instead defer again to Dr. Saenz  And going to continue to encourage  compliance given his history of noncompliance has of 4/1/2025 he has been compliant here with heart failure clinic  He is rather adamant about not wearing a LifeVest  Repeat echocardiogram to be mid Apr we will go ahead and order for  if there is no recovery in his ejection fraction on follow-up echo in 90 days would certainly recommend ICD and would defer to EP regarding the type of ICD he needs and would allow Dr. Saenz to make the consult to EP  Needs to continue to follow-up with Dr. Gramajo his primary nephrologist  Would add typical heart failure nursing interventions including:                          A. Fluid restriction at less than 2000 cc daily                          B. Daily weights                          C.  1 g low-sodium diet        I did the following activities preparing for the visit with Guanakito Stlyes  including: reviewing tests, once pt arrived in clinic I also performed a medically appropriate examination and/or evaluation, I personally spent considerable time counseling and educating the patient/family/caregiver, ordering medications, tests, or procedures, referring and communicating with other health care professionals, and documenting information in the medical record. I estimate including preparation 30 minutes              Subjective:     Chief Complaint   Patient presents with    Congestive Heart Failure       ROS:    Constitutional: + weakness, + fatigue persist with no real improvement, No fever, rigors, chills   Eyes: No recent vision changes, eye pain   ENT/oropharynx: No recent difficulty swallowing, sore throat, epistaxis, changes in hearing   Cardiovascular: No recent chest pain, chest tightness, palpitations except as noted in HPI, paroxysmal nocturnal dyspnea, orthopnea, diaphoresis, dizziness & no pre or mark syncopal episodes   Respiratory: No significant shortness of breath at rest, + dyspnea on exertion he is once again is unable to supply me with distance where it  occurs, no significant productive cough, no wheezing and no hemoptysis   Gastrointestinal: No abdominal pain, nausea, vomiting, diarrhea, bloody stools   Genitourinary: No hematuria, dysuria other than increased frequency given the recent increase in his diuretics   Neurological: No change in typical headache, no new onset of any tremors, numbness,  or hemiparesis    Musculoskeletal: No change in typical cramps, myalgias, no new joint pain, joint swelling   Integument: No recent rash, + mild edema on the right lower extremitybut better significantly reduced since recent hospitalization      Patient Active Problem List   Diagnosis    Cardiomegaly    Elevated troponin    Long term (current) use of anticoagulants    Moderate to severe pulmonary hypertension    Valvular heart disease    Cardiomyopathy, dilated, nonischemic    Chronic combined systolic and diastolic heart failure (HFrEF)    Stage 3b chronic kidney disease    At high risk for excess fluid volume    h/o Jan 24 Pulmonary embolism on long-term anticoagulation therapy    Poor compliance    QUINONES (dyspnea on exertion)       Past Medical History:   Diagnosis Date    Acute pulmonary embolism 03/07/2023    Atrial fibrillation 03/16/23    CHF (congestive heart failure) 03/07/23    Coronary artery disease 03/07/23    Deep vein thrombosis 03/07/23       Past Surgical History:   Procedure Laterality Date    CARDIAC CATHETERIZATION N/A 3/8/2023    Procedure: Left Heart Cath and coronary angiogram;  Surgeon: Axel Saenz MD;  Location: [facility] CATH INVASIVE LOCATION;  Service: Cardiovascular;  Laterality: N/A;    CARDIAC CATHETERIZATION  3/8/2023    Procedure: Right and Left Heart Cath;  Surgeon: Axel Saenz MD;  Location: [facility] CATH INVASIVE LOCATION;  Service: Cardiovascular;;       Social History     Socioeconomic History    Marital status:    Tobacco Use    Smoking status: Former     Types: Cigarettes     Passive exposure: Past    Smokeless  tobacco: Never   Vaping Use    Vaping status: Never Used   Substance and Sexual Activity    Alcohol use: Yes     Alcohol/week: 1.0 standard drink of alcohol     Types: 1 Drinks containing 0.5 oz of alcohol per week    Drug use: Never    Sexual activity: Not Currently     Partners: Female       Allergies   Allergen Reactions    Albuterol Shortness Of Breath     Pt reports that breathing is worse with use    Penicillins Unknown - Low Severity         Current Outpatient Medications:     bumetanide (Bumex) 1 MG tablet, Take 1 tablet by mouth 2 (Two) Times a Day., Disp: 180 tablet, Rfl: 2    empagliflozin (Jardiance) 10 MG tablet tablet, Take 1 tablet by mouth Daily., Disp: 90 tablet, Rfl: 1    lisinopril (PRINIVIL,ZESTRIL) 5 MG tablet, Take 0.5 tablets by mouth Daily. Hold or skip if systolic BP < 100 mmHg, Disp: 90 tablet, Rfl: 2    metoprolol succinate XL (TOPROL-XL) 25 MG 24 hr tablet, Take 0.5 tablets by mouth Every 12 (Twelve) Hours. Skip or hold dose for systolic BP < 100 mmHg or HR < 60 BPM unless you have pacemaker & or ICD, Disp: 90 tablet, Rfl: 3    spironolactone (ALDACTONE) 25 MG tablet, Take 0.5 tablets by mouth Daily., Disp: 45 tablet, Rfl: 2    warfarin (COUMADIN) 3 MG tablet, TAKE 1 TABLET BY MOUTH DAILY, Disp: 30 tablet, Rfl: 2    warfarin (COUMADIN) 3 MG tablet, TAKE 1 TABLET BY MOUTH DAILY, Disp: 14 tablet, Rfl: 0      There is no immunization history on file for this patient.      Most recent EKG as reviewed:  Procedures     Most recent echo as reviewed:  Results for orders placed during the hospital encounter of 02/04/25    Adult Transthoracic Echo Complete w/ Color, Spectral and Contrast if Necessary Per Protocol    Interpretation Summary    Left ventricular ejection fraction appears to be less than 20%.    Indications  Shortness of breath    Technically satisfactory study.  Mitral valve is structurally normal.  Tricuspid valve is structurally normal.  Aortic valve is structurally normal.  Pulmonic  valve could not be well visualized.  Significant mitral and tricuspid regurgitation is present.  Significant biatrial enlargement.  Significant left ventricle enlargement and severe and diffuse hypocontractility with ejection fraction of less than 20%.  Grade 3 left ventricular diastolic dysfunction is present.  Left ventricular peak systolic global longitudinal strain is -5%.  Right ventricle is enlarged with hypocontractility.  TAPSE 1.52 cm.  Atrial septum is intact.  Aorta is normal.  IVC dilatation with decreased respiratory variation suggestive of increased right atrial pressure.  No pericardial effusion or intracardiac thrombus is seen.    Impression  Significant mitral and tricuspid regurgitation is present.  Significant biatrial enlargement.  Significant left ventricle enlargement and severe and diffuse hypocontractility with ejection fraction of less than 20%.  Grade 3 left ventricular diastolic dysfunction is present.  Left ventricular peak systolic global longitudinal strain is -5%.  Right ventricle is enlarged with hypocontractility.  TAPSE 1.52 cm.      Most recent stress test results:  Results for orders placed during the hospital encounter of 03/06/23    Stress Test With Myocardial Perfusion One Day    Interpretation Summary  Indications  Shortness of breath  Chest pain    This study was performed under the direct supervision Hemalatha TAVERAS.    Resting ECG  Sinus rhythm left bundle branch block    Patient exercised for 3.17 minutes using the Kristopher protocol.  The maximum heart rate achieved was 110 and maximum systolic blood pressure 100.  Patient did not have any chest discomfort ST-T wave abnormalities but had premature ventricular contractions.    Cardiolite was used as an imaging agent.    Cardiolite images significantly decreased radionuclide uptake in the inferior and posterolateral segments with partial redistribution in the resting images.    Gated SPECT images revealed left ventricle enlargement  with severe and diffuse hypocontractility with ejection fraction of 10%.    Impression  ========    Limited exercise tolerance.    Significantly decreased radionuclide uptake in the inferior and posterolateral segments with partial redistribution.    Gated SPECT images revealed significant left ventricle enlargement with severe and diffuse hypocontractility with ejection fraction of 10%.  Possible cardiomyopathy ischemic versus nonischemic.    Suggest clinical correlation and correlation with echocardiographic findings.      Most recent cardiac catheterization results:  Results for orders placed during the hospital encounter of 03/06/23    Cardiac Catheterization/Vascular Study    Conclusion  CARDIAC CATHETERIZATION REPORT    DATE OF PROCEDURE:  3/8/2023    INDICATION FOR PROCEDURE:  Acute systolic congestive heart failure  Cardiomyopathy  Shortness of breath  Abnormal stress Cardiolite test    PROCEDURE PERFORMED:  Right heart catheterization left heart catheterization, coronary angiography  Left ventricle angiogram was not performed due to pre-existing renal dysfunction.    @@  Moderate conscious sedation was utilized with intravenous Versed and fentanyl administered by registered nurse with continuous ECG, pulse oximetry and hemodynamic monitoring supervised by myself throughout the entire procedure.  Conscious sedation time   30 minutes    I was present with the patient for the duration of moderate sedation and supervised staff who had no other duties and monitored the patient for the entire procedure.    @@    Under usual sterile precautions and 1% Xylocaine filtration right femoral vein and femoral artery were percutaneously punctured and a 7 and 5 Romanian vascular sheaths were respectively inserted.  [place]-Eliana catheter was used to measure right-sided pressures pulmonary capital wedge pressure was measured.  [place]-Eliana catheter was removed and subsequently left and right coronary arteriography was performed.   Left ventricular pressures were measured with pigtail catheter.  Left ventricle angiogram was not performed due to pre-existing renal dysfunction.  Patient tolerated the procedure well.  Hemostasis was obtained and patient was returned to the room with intact pulses.      FINDINGS:    1. HEMODYNAMICS:  Left ventricle end-diastolic pressure is normal.  Mean right atrial pressure is 8 right ventricle 42/3 pulmonary artery 47/19 mean pulmonary artery 31 and pulmonary capital wedge pressure is 19.    2. LEFT VENTRICULOGRAPHY:  Not performed due to pre-existing renal dysfunction.    3. CORONARY ANGIOGRAPHY:  Left main coronary artery is normal.  Left anterior descending artery is normal.  Circumflex coronary artery is normal.  Right coronary artery is a large and dominant vessel and is normal.    SUMMARY:  Mild pulmonary hypertension.  Left ventricle angiogram was not performed due to pre-existing renal dysfunction.  Patient has an ejection fraction of 10 to 15% (echocardiogram)  Normal epicardial coronary arteries.    RECOMMENDATIONS:  GDMT for nonischemic cardiomyopathy as tolerated.  Intensive but cautious diuresis with close observation of renal function.  Patient would benefit from LifeVest.  Modification of risk factors.  Patient would benefit from ICD if left ventricular function does not improve with GDMT.        Historical data copied forward from previous encounters in EMR including the history, exam, and assessment/plan has been reviewed and is unchanged except as I have noted and otherwise indicated.      Objective:         /69   Pulse 83   Resp 18   Wt 96.7 kg (213 lb 3.2 oz)   SpO2 96%   BMI 30.59 kg/m²     General:  Well-developed, longhaired, well-nourished, cooperative, no acute distress, appears stated age if not slightly older, generally moves slowly   Neuro:  A&O x3. No significantly obvious focal neuro deficet    Psych:  mildly flattened affect    HENT:  Normocephalic, atraumatic, moist  mucous membranes , Normal ear placement,Throat not injected   Eyes:  PERRLA, Conjunctivae not injected, EOM's intact, wearing spectacles to aid in his vision, conjunctiva does not appear significantly injected   Neck:  Supple, not excessively thickened, no lymph adenopathy nor thyromegaly no JVD or bruit    Lungs:    Symmetrical rise & fall of chest with baseline respiratory pattern. To auscultation lungs are Clear bilaterally, faint late phase expiratory wheezes once again noted,, no rhonchi or rales are noted, bases bilaterally seem real relatively well aerated   Chest wall:  No significant tenderness when palpated. PMI @ 6th ICS MAL    Heart:   rate in the 80s and mildly irregular rhythm, S1 and S2 normal, no S3, there is an obvious S4, Gr III/VI systolic ejection murmur best heard at the apex , no obvious rub, click or gallop.    Abdomen:  non-distended, non-tender, bowel sounds noted in the 4 quadrants of the abdomen, he is well without significant central abdominal adipose tissue identified    Extremities:  Equal color motion temperature and sensitivity, Rapid capillary refill noted within the nailbeds of fingers and toes bilaterally trace on the right and none noted on the left lower extremity edema.    Pulses:  2+ and symmetric all extremities, rapid capillary refill    Skin:  No obvious rashes, significant lesions identified, warm dry and of normal turgor      In-Office Procedure(s):  No orders to display        Imaging:    Results for orders placed during the hospital encounter of 02/04/25    XR Chest 2 View    Narrative  XR CHEST 2 VW    Date of Exam: 2/4/2025 2:58 PM EST    Indication: soa    Comparison: 3/6/2023    Findings:  Cardiomegaly. Mediastinal contours within normal limits. Mild pulmonary vascular congestion. Query mild edema.    Impression  Impression:  Cardiomegaly and mild pulmonary vascular congestion.        Electronically Signed: Andrew Lennon MD  2/4/2025 3:17 PM EST  Workstation ID:  [id number]       Results for orders placed during the hospital encounter of 02/04/25    US Renal Bilateral    Narrative  US RENAL BILATERAL    Date of Exam: 2/5/2025 2:58 PM EST    Indication: nato.    Comparison: No comparisons available.    Technique: Grayscale and color Doppler ultrasound evaluation of the kidneys and urinary bladder was performed.      Findings:  The right kidney measures 11.7 x 4.9 x 5.9 cm. The left kidney measures 12 x 6.5 x 5.8 cm. Corticomedullary transition maintained. No hydronephrosis. No debris in the bladder.    Impression  Impression:  No hydronephrosis or acute sonographic abnormality.            Electronically Signed: Lakhwinder Rawls MD  2/5/2025 3:11 PM EST  Workstation ID: [id number]      Results for orders placed during the hospital encounter of 03/06/23    CT Angiogram Chest Pulmonary Embolism    Narrative  CT ANGIOGRAM CHEST WITH IV CONTRAST    INDICATION: Cough    COMPARISON: None available.    PROCEDURE: Multiple axial CT images were obtained of the chest after IV administration of 100 mL of Isovue-370..  Coronal and sagittal reformations as well as MIP images were obtained.  CT dose lowering techniques were used, to include: automated  exposure control, adjustment for patient size, and or use of iterative reconstruction.    FINDINGS:    Cardiovascular: There is a solitary miniscule pulmonary embolus in a distal segmental branch of the right lower lobe propagating into a subsegmental level. It is very small. No other acute pulmonary emboli are identified. No focal aortic aneurysm or  evidence of aortic dissection is seen.    Mediastinum/Alena: Cardiomegaly.    Lungs/Pleura :  Small pleural effusions. Lungs are clear.    Chest wall and Axilla: Unremarkable.    Bones:  No acute focal bony abnormality seen.    Upper abdomen: No focal abnormalities seen.    Impression  1. There is an extremely small acute pulmonary embolus in a far distal segmental branch of the right lower lobe  propagating into a subsegmental level. It only measures a few millimeters across. No other acute pulmonary emboli are identified.  2. Very small bilateral pleural effusions.  3. Cardiomegaly    Electronically signed by:  Michele Lomeli M.D.  3/6/2023 10:29 PM Mountain Time      Lab Review:   Hospital Outpatient Visit on 04/01/2025   Component Date Value    QT Interval 04/01/2025 424     QTC Interval 04/01/2025 474    Lab on 03/31/2025   Component Date Value    Magnesium 03/31/2025 2.4     proBNP 03/31/2025 3,527.0 (H)     Glucose 03/31/2025 112 (H)     BUN 03/31/2025 19     Creatinine 03/31/2025 1.34 (H)     Sodium 03/31/2025 137     Potassium 03/31/2025 4.1     Chloride 03/31/2025 95 (L)     CO2 03/31/2025 30.1 (H)     Calcium 03/31/2025 9.7     BUN/Creatinine Ratio 03/31/2025 14.2     Anion Gap 03/31/2025 11.9     eGFR 03/31/2025 56.3 (L)     Protime 03/31/2025 21.6     INR 03/31/2025 1.86 (L)    Anticoagulation Visit on 03/31/2025   Component Date Value    INR 03/31/2025 2.30 (A)    Hospital Outpatient Visit on 02/24/2025   Component Date Value    proBNP 02/24/2025 5,060.0 (H)     Glucose 02/24/2025 112 (H)     BUN 02/24/2025 32 (H)     Creatinine 02/24/2025 1.60 (H)     Sodium 02/24/2025 134 (L)     Potassium 02/24/2025 4.6     Chloride 02/24/2025 93 (L)     CO2 02/24/2025 26.7     Calcium 02/24/2025 9.8     BUN/Creatinine Ratio 02/24/2025 20.0     Anion Gap 02/24/2025 14.3     eGFR 02/24/2025 45.5 (L)     TSH 02/24/2025 4.190     Hemoglobin A1C 02/24/2025 6.11 (H)     Magnesium 02/24/2025 2.8 (H)     Protime 02/24/2025 19.2 (L)     INR 02/24/2025 1.61 (L)     Total Protein 02/24/2025 9.0 (H)     Albumin 02/24/2025 4.4     ALT (SGPT) 02/24/2025 62 (H)     AST (SGOT) 02/24/2025 56 (H)     Alkaline Phosphatase 02/24/2025 95     Total Bilirubin 02/24/2025 0.6     Bilirubin, Direct 02/24/2025 0.2     Bilirubin, Indirect 02/24/2025 0.4     Total Cholesterol 02/24/2025 199     Triglycerides 02/24/2025 192 (H)      "HDL Cholesterol 02/24/2025 45     LDL Cholesterol  02/24/2025 120 (H)     VLDL Cholesterol 02/24/2025 34     LDL/HDL Ratio 02/24/2025 2.57     WBC 02/24/2025 9.43     RBC 02/24/2025 5.14     Hemoglobin 02/24/2025 14.1     Hematocrit 02/24/2025 45.5     MCV 02/24/2025 88.5     MCH 02/24/2025 27.4     MCHC 02/24/2025 31.0 (L)     RDW 02/24/2025 15.1     RDW-SD 02/24/2025 49.6     MPV 02/24/2025 11.1     Platelets 02/24/2025 290     Neutrophil % 02/24/2025 58.6     Lymphocyte % 02/24/2025 28.1     Monocyte % 02/24/2025 11.5     Eosinophil % 02/24/2025 1.0     Basophil % 02/24/2025 0.5     Immature Grans % 02/24/2025 0.3     Neutrophils, Absolute 02/24/2025 5.53     Lymphocytes, Absolute 02/24/2025 2.65     Monocytes, Absolute 02/24/2025 1.08 (H)     Eosinophils, Absolute 02/24/2025 0.09     Basophils, Absolute 02/24/2025 0.05     Immature Grans, Absolute 02/24/2025 0.03     nRBC 02/24/2025 0.0    Anticoagulation Visit on 02/10/2025   Component Date Value    INR 02/10/2025 2.30 (A)    No results displayed because visit has over 200 results.        Recent labs reviewed and interpreted for clinical significance and application    Guanakito is wife and I went over each of his lab findings here in the heart failure clinic while he was still present          It is a pleasure to be involved in this patient's cardiovascular care relating to their heart failure.  Please feel free to call me with any questions or concerns.    Gurpreet \"Tommy\" Bob KHALIL, [facility]  Heart failure program clinical provider    Gurpreet Rojas, REMI   03/14/25  .  "

## 2025-03-26 NOTE — TELEPHONE ENCOUNTER
Heart Failure Clinic: [facility]  Clinical Outreach     Guanakito Styles is a 74 y.o. male and is a patient of the [facility] heart failure clinic.     Patient's daughter called and was having issues filling patient's jardiance.  Patient just received approval for medicaid and has the new prescription numbers.  Called [facility] with the new numbers, and they were able to fill Jardiance for $4.30 for co-pay.  Called daughter back and let her know [facility] was able to run prescription through with no problems with the medicaid numbers.      Artur Coronel, PharmD  Ambulatory Care Clinical Pharmacist  3/26/2025  14:59 EDT

## 2025-03-27 NOTE — PROGRESS NOTES
Subjective:     Encounter Date:03/31/2025        Patient ID: Guanakito Styles 1950     Chief Complaint:  hospital discharge follow-up    History of Present Illness:  Guanakito Styles is a 74 y.o. male patient of Dr. Saenz with a history of nonischemic cardiomyopathy, valvular heart disease, pulmonary hypertension, tobacco abuse, pulmonary embolism on chronic anticoagulation, chronic kidney disease, and poor compliance who was recently hospitalized at [facility] with acute CHF exacerbation and acute kidney injury. He was diuresed and discharged home on oral Bumex, carvedilol, Jardiance, and spironolactone. He was seen in the heart failure clinic on 2/24/25 and was started on lisinopril. He presents to the office today for hospital discharge follow-up. He denies any chest pain, shortness of breath, or leg swelling. He states he has been compliant with his medications and weighing himself daily. He reports occasional dizziness/lightheadedness when he first stands up, but denies any near syncope/syncope.       Review of systems:  Review of Systems   Constitutional: Negative for malaise/fatigue.   Cardiovascular:  Negative for chest pain, dyspnea on exertion, leg swelling and palpitations.   Respiratory:  Negative for cough and shortness of breath.    Gastrointestinal:  Negative for abdominal pain, nausea and vomiting.   Neurological:  Positive for dizziness and light-headedness. Negative for focal weakness, headaches and numbness.   All other systems reviewed and are negative.        The following portions of the patient's history were reviewed and updated as appropriate: allergies, current medications, past family history, past medical history, past social history, past surgical history and problem list.    Past Medical History:  Past Medical History:   Diagnosis Date    Acute pulmonary embolism 03/07/2023    Atrial fibrillation 03/16/23    CHF (congestive heart failure) 03/07/23    Coronary artery  disease 03/07/23    Deep vein thrombosis 03/07/23       Past Surgical History:  Past Surgical History:   Procedure Laterality Date    CARDIAC CATHETERIZATION N/A 3/8/2023    Procedure: Left Heart Cath and coronary angiogram;  Surgeon: Axel Saenz MD;  Location: [facility] CATH INVASIVE LOCATION;  Service: Cardiovascular;  Laterality: N/A;    CARDIAC CATHETERIZATION  3/8/2023    Procedure: Right and Left Heart Cath;  Surgeon: Axel Saenz MD;  Location: [facility] CATH INVASIVE LOCATION;  Service: Cardiovascular;;        Allergies:  Allergies   Allergen Reactions    Albuterol Shortness Of Breath     Pt reports that breathing is worse with use    Penicillins Unknown - Low Severity       Current Meds:     Current Outpatient Medications:     bumetanide (Bumex) 1 MG tablet, Take 1 tablet by mouth 2 (Two) Times a Day., Disp: 180 tablet, Rfl: 2    carvedilol (Coreg) 3.125 MG tablet, Take 1 tablet by mouth 2 (Two) Times a Day With Meals. Skip or Hold dose if Systolic BP < 100 mmHg or HR < 60 BPM unless you have a Pacemaker or ICD, Disp: 180 tablet, Rfl: 2    empagliflozin (Jardiance) 10 MG tablet tablet, Take 1 tablet by mouth Daily., Disp: 90 tablet, Rfl: 1    lisinopril (PRINIVIL,ZESTRIL) 5 MG tablet, Take 0.5 tablets by mouth Daily. Hold or skip if systolic BP < 100 mmHg, Disp: 90 tablet, Rfl: 2    spironolactone (ALDACTONE) 25 MG tablet, Take 0.5 tablets by mouth Daily., Disp: 45 tablet, Rfl: 2    warfarin (COUMADIN) 3 MG tablet, TAKE 1 TABLET BY MOUTH DAILY, Disp: 30 tablet, Rfl: 2    warfarin (COUMADIN) 3 MG tablet, TAKE 1 TABLET BY MOUTH DAILY, Disp: 14 tablet, Rfl: 0    Social History:   Social History     Tobacco Use    Smoking status: Former     Types: Cigarettes     Passive exposure: Past    Smokeless tobacco: Never   Substance Use Topics    Alcohol use: Yes     Alcohol/week: 1.0 standard drink of alcohol     Types: 1 Drinks containing 0.5 oz of alcohol per week        Family History:  History reviewed. No  "pertinent family history.              Objective:       Physical Exam:  Vitals reviewed.   Constitutional:       Appearance: Well-developed and well-groomed. Obese.   Eyes:      Conjunctiva/sclera: Conjunctivae normal.      Pupils: Pupils are equal, round, and reactive to light.   HENT:      Head: Normocephalic and atraumatic.    Mouth/Throat:      Mouth: Mucous membranes are moist.      Pharynx: Oropharynx is clear.   Pulmonary:      Effort: Pulmonary effort is normal.      Breath sounds: Normal breath sounds.   Cardiovascular:      PMI at left midclavicular line. Normal rate. Regular rhythm.   Pulses:     Intact distal pulses.   Edema:     Pretibial: bilateral trace edema of the pretibial area.  Abdominal:      General: Bowel sounds are normal.      Palpations: Abdomen is soft.   Musculoskeletal: Normal range of motion.      Cervical back: Normal range of motion. Skin:     General: Skin is warm and dry.   Neurological:      Mental Status: Alert and oriented to person, place, and time.   Psychiatric:         Mood and Affect: Mood normal.         Behavior: Behavior normal.         Vital signs:  BP 99/67 (BP Location: Left arm, Patient Position: Sitting, Cuff Size: Adult)   Pulse 80   Ht 177.8 cm (70\")   Wt 95.7 kg (211 lb)   SpO2 97%   BMI 30.28 kg/m²       Recent labs reviewed:    CBC        BMP        CMP       Creatinine Clearance  CrCl cannot be calculated (Patient's most recent lab result is older than the maximum 30 days allowed.).    BNP        TROPONIN        CoAg  Results from last 7 days   Lab Units 03/31/25  1459 03/31/25  1415   INR  1.86* 2.30*         Cardiology results reviewed:    Stress test  Results for orders placed during the hospital encounter of 03/06/23    Stress Test With Myocardial Perfusion One Day    Interpretation Summary  Indications  Shortness of breath  Chest pain    This study was performed under the direct supervision Hemalatha TAVERAS.    Resting ECG  Sinus rhythm left bundle branch " block    Patient exercised for 3.17 minutes using the Kristopher protocol.  The maximum heart rate achieved was 110 and maximum systolic blood pressure 100.  Patient did not have any chest discomfort ST-T wave abnormalities but had premature ventricular contractions.    Cardiolite was used as an imaging agent.    Cardiolite images significantly decreased radionuclide uptake in the inferior and posterolateral segments with partial redistribution in the resting images.    Gated SPECT images revealed left ventricle enlargement with severe and diffuse hypocontractility with ejection fraction of 10%.    Impression  ========    Limited exercise tolerance.    Significantly decreased radionuclide uptake in the inferior and posterolateral segments with partial redistribution.    Gated SPECT images revealed significant left ventricle enlargement with severe and diffuse hypocontractility with ejection fraction of 10%.  Possible cardiomyopathy ischemic versus nonischemic.    Suggest clinical correlation and correlation with echocardiographic findings.      Echocardiogram  Results for orders placed during the hospital encounter of 02/04/25    Adult Transthoracic Echo Complete w/ Color, Spectral and Contrast if Necessary Per Protocol    Interpretation Summary    Left ventricular ejection fraction appears to be less than 20%.    Indications  Shortness of breath    Technically satisfactory study.  Mitral valve is structurally normal.  Tricuspid valve is structurally normal.  Aortic valve is structurally normal.  Pulmonic valve could not be well visualized.  Significant mitral and tricuspid regurgitation is present.  Significant biatrial enlargement.  Significant left ventricle enlargement and severe and diffuse hypocontractility with ejection fraction of less than 20%.  Grade 3 left ventricular diastolic dysfunction is present.  Left ventricular peak systolic global longitudinal strain is -5%.  Right ventricle is enlarged with  hypocontractility.  TAPSE 1.52 cm.  Atrial septum is intact.  Aorta is normal.  IVC dilatation with decreased respiratory variation suggestive of increased right atrial pressure.  No pericardial effusion or intracardiac thrombus is seen.    Impression  Significant mitral and tricuspid regurgitation is present.  Significant biatrial enlargement.  Significant left ventricle enlargement and severe and diffuse hypocontractility with ejection fraction of less than 20%.  Grade 3 left ventricular diastolic dysfunction is present.  Left ventricular peak systolic global longitudinal strain is -5%.  Right ventricle is enlarged with hypocontractility.  TAPSE 1.52 cm.      Cardiac catheterization  Results for orders placed during the hospital encounter of 03/06/23    Cardiac Catheterization/Vascular Study    Conclusion  CARDIAC CATHETERIZATION REPORT    DATE OF PROCEDURE:  3/8/2023    INDICATION FOR PROCEDURE:  Acute systolic congestive heart failure  Cardiomyopathy  Shortness of breath  Abnormal stress Cardiolite test    PROCEDURE PERFORMED:  Right heart catheterization left heart catheterization, coronary angiography  Left ventricle angiogram was not performed due to pre-existing renal dysfunction.    @@  Moderate conscious sedation was utilized with intravenous Versed and fentanyl administered by registered nurse with continuous ECG, pulse oximetry and hemodynamic monitoring supervised by myself throughout the entire procedure.  Conscious sedation time   30 minutes    I was present with the patient for the duration of moderate sedation and supervised staff who had no other duties and monitored the patient for the entire procedure.    @@    Under usual sterile precautions and 1% Xylocaine filtration right femoral vein and femoral artery were percutaneously punctured and a 7 and 5 Israeli vascular sheaths were respectively inserted.  Guys-Eliana catheter was used to measure right-sided pressures pulmonary capital wedge pressure  was measured.  Penfield-Eliana catheter was removed and subsequently left and right coronary arteriography was performed.  Left ventricular pressures were measured with pigtail catheter.  Left ventricle angiogram was not performed due to pre-existing renal dysfunction.  Patient tolerated the procedure well.  Hemostasis was obtained and patient was returned to the room with intact pulses.      FINDINGS:    1. HEMODYNAMICS:  Left ventricle end-diastolic pressure is normal.  Mean right atrial pressure is 8 right ventricle 42/3 pulmonary artery 47/19 mean pulmonary artery 31 and pulmonary capital wedge pressure is 19.    2. LEFT VENTRICULOGRAPHY:  Not performed due to pre-existing renal dysfunction.    3. CORONARY ANGIOGRAPHY:  Left main coronary artery is normal.  Left anterior descending artery is normal.  Circumflex coronary artery is normal.  Right coronary artery is a large and dominant vessel and is normal.    SUMMARY:  Mild pulmonary hypertension.  Left ventricle angiogram was not performed due to pre-existing renal dysfunction.  Patient has an ejection fraction of 10 to 15% (echocardiogram)  Normal epicardial coronary arteries.    RECOMMENDATIONS:  GDMT for nonischemic cardiomyopathy as tolerated.  Intensive but cautious diuresis with close observation of renal function.  Patient would benefit from LifeVest.  Modification of risk factors.  Patient would benefit from ICD if left ventricular function does not improve with GDMT.               Assessment:         Diagnoses and all orders for this visit:    1. 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  (TTE 2/5/25) LVEF <20% with grade 3 diastolic dysfunction   I. (TTE 3/7/23) LVEF-10 to 15%   B.  LifeVest recommended March 2023           I.   no devices currently      2. Cardiomyopathy, dilated, nonischemic  Overview:  A.  LVIDD 7 cm  B.  Chronic combined systolic and diastolic heart failure (HFrEF)  C.  (TTE 2/5/25) LVEF <20%  with grade 3 diastolic dysfunction  D.  LifeVest recommended March 23 no devices currently      3. Valvular heart disease  Overview:  A moderate to severe MR  B.  Moderate severe TR      4. Moderate to severe pulmonary hypertension  Overview:  RVSP 49 mmHg      5. h/o Jan 24 Pulmonary embolism on long-term anticoagulation therapy    6. Stage 3b chronic kidney disease  Overview:  Followed by -Dr. Gramajo.       7. Hospital discharge follow-up                  Plan:       Chronic combined systolic and diastolic heart failure (HFrEF) secondary to nonischemic, dilated cardiomyopathy  Echocardiogram done in March 2023 showed LVEF of 10-15%  Recent echocardiogram shows significant LV enlargement and severe/diffuse hypo-contractility with LVEF less than 20%, grade 3 diastolic dysfunction, significant biatrial enlargement, and right ventricle enlargement with hypo-contractility.   History of poor compliance with medication and follow-up.  The patient states he previously had a LifeVest, but chose not to wear it.   He reports recent compliance with medication.   [facility]   Today he appears euvolemic.  His blood pressure is soft.  Continue Bumex, carvedilol, Jardiance, lisinopril, and spironolactone.  Recommend repeat echocardiogram after 3 months of GDMT.  Consider ICD placement for secondary prevention of sudden cardiac death if EF remains less than 35%.   Daily weights and low sodium diet recommended.     Valvular heart disease  Recent echocardiogram shows significant mitral and tricuspid regurgitation.   On GDMT as above  Recommend repeat echo after 3 months of GDMT.    Moderate to severe pulmonary hypertension  Recent echocardiogram shows RVSP of 49 mmHg  Recommend repeat echo after 3 months of GDMT.    History of Pulmonary embolism   On long-term anticoagulation with warfarin  Today his INR is therapeutic at 2.3    Stage 3b chronic kidney disease  Followed by nephrology,   [facility] discharge follow-up  History of poor compliance with medical therapy and follow-up.  He states he has recently been compliant with his medicine.   I will have him follow-up with Dr. Saenz in 3 months.         Electronically signed by REMI Storey, 03/31/25, 3:30 PM EDT.

## 2025-03-31 ENCOUNTER — ANTICOAGULATION VISIT (OUTPATIENT)
Dept: CARDIOLOGY | Facility: CLINIC | Age: 75
End: 2025-03-31
Payer: MEDICARE

## 2025-03-31 ENCOUNTER — OFFICE VISIT (OUTPATIENT)
Dept: CARDIOLOGY | Facility: CLINIC | Age: 75
End: 2025-03-31
Payer: MEDICARE

## 2025-03-31 ENCOUNTER — LAB (OUTPATIENT)
Dept: LAB | Facility: HOSPITAL | Age: 75
End: 2025-03-31
Payer: MEDICARE

## 2025-03-31 VITALS
BODY MASS INDEX: 30.21 KG/M2 | HEART RATE: 80 BPM | DIASTOLIC BLOOD PRESSURE: 67 MMHG | OXYGEN SATURATION: 97 % | SYSTOLIC BLOOD PRESSURE: 99 MMHG | HEIGHT: 70 IN | WEIGHT: 211 LBS

## 2025-03-31 VITALS
WEIGHT: 211 LBS | SYSTOLIC BLOOD PRESSURE: 99 MMHG | BODY MASS INDEX: 30.28 KG/M2 | HEART RATE: 80 BPM | DIASTOLIC BLOOD PRESSURE: 67 MMHG

## 2025-03-31 DIAGNOSIS — Z79.01 LONG TERM (CURRENT) USE OF ANTICOAGULANTS: ICD-10-CM

## 2025-03-31 DIAGNOSIS — I42.0 CARDIOMYOPATHY, DILATED, NONISCHEMIC: Chronic | ICD-10-CM

## 2025-03-31 DIAGNOSIS — R06.09 DOE (DYSPNEA ON EXERTION): ICD-10-CM

## 2025-03-31 DIAGNOSIS — N18.32 STAGE 3B CHRONIC KIDNEY DISEASE: Chronic | ICD-10-CM

## 2025-03-31 DIAGNOSIS — I26.99 PULMONARY EMBOLISM ON LONG-TERM ANTICOAGULATION THERAPY: Chronic | ICD-10-CM

## 2025-03-31 DIAGNOSIS — I38 VALVULAR HEART DISEASE: Chronic | ICD-10-CM

## 2025-03-31 DIAGNOSIS — I50.42 CHRONIC COMBINED SYSTOLIC AND DIASTOLIC HEART FAILURE: Primary | Chronic | ICD-10-CM

## 2025-03-31 DIAGNOSIS — Z09 HOSPITAL DISCHARGE FOLLOW-UP: ICD-10-CM

## 2025-03-31 DIAGNOSIS — Z79.01 LONG TERM (CURRENT) USE OF ANTICOAGULANTS: Primary | ICD-10-CM

## 2025-03-31 DIAGNOSIS — Z79.01 PULMONARY EMBOLISM ON LONG-TERM ANTICOAGULATION THERAPY: Chronic | ICD-10-CM

## 2025-03-31 DIAGNOSIS — I27.20 PULMONARY HYPERTENSION: Chronic | ICD-10-CM

## 2025-03-31 LAB
ANION GAP SERPL CALCULATED.3IONS-SCNC: 11.9 MMOL/L (ref 5–15)
BUN SERPL-MCNC: 19 MG/DL (ref 8–23)
BUN/CREAT SERPL: 14.2 (ref 7–25)
CALCIUM SPEC-SCNC: 9.7 MG/DL (ref 8.6–10.5)
CHLORIDE SERPL-SCNC: 95 MMOL/L (ref 98–107)
CO2 SERPL-SCNC: 30.1 MMOL/L (ref 22–29)
CREAT SERPL-MCNC: 1.34 MG/DL (ref 0.76–1.27)
EGFRCR SERPLBLD CKD-EPI 2021: 56.3 ML/MIN/1.73
GLUCOSE SERPL-MCNC: 112 MG/DL (ref 65–99)
INR PPP: 1.86 (ref 2–3)
INR PPP: 2.3 (ref 0.9–1.1)
MAGNESIUM SERPL-MCNC: 2.4 MG/DL (ref 1.6–2.4)
NT-PROBNP SERPL-MCNC: 3527 PG/ML (ref 0–900)
POTASSIUM SERPL-SCNC: 4.1 MMOL/L (ref 3.5–5.2)
PROTHROMBIN TIME: 21.6 SECONDS (ref 19.4–28.5)
SODIUM SERPL-SCNC: 137 MMOL/L (ref 136–145)

## 2025-03-31 PROCEDURE — 83735 ASSAY OF MAGNESIUM: CPT

## 2025-03-31 PROCEDURE — 85610 PROTHROMBIN TIME: CPT

## 2025-03-31 PROCEDURE — 99214 OFFICE O/P EST MOD 30 MIN: CPT | Performed by: NURSE PRACTITIONER

## 2025-03-31 PROCEDURE — 36416 COLLJ CAPILLARY BLOOD SPEC: CPT | Performed by: INTERNAL MEDICINE

## 2025-03-31 PROCEDURE — G2211 COMPLEX E/M VISIT ADD ON: HCPCS | Performed by: NURSE PRACTITIONER

## 2025-03-31 PROCEDURE — 83880 ASSAY OF NATRIURETIC PEPTIDE: CPT

## 2025-03-31 PROCEDURE — 85610 PROTHROMBIN TIME: CPT | Performed by: INTERNAL MEDICINE

## 2025-03-31 PROCEDURE — 80048 BASIC METABOLIC PNL TOTAL CA: CPT

## 2025-03-31 PROCEDURE — 1159F MED LIST DOCD IN RCRD: CPT | Performed by: NURSE PRACTITIONER

## 2025-03-31 PROCEDURE — 1160F RVW MEDS BY RX/DR IN RCRD: CPT | Performed by: NURSE PRACTITIONER

## 2025-04-01 ENCOUNTER — DISEASE STATE MANAGEMENT VISIT (OUTPATIENT)
Facility: HOSPITAL | Age: 75
End: 2025-04-01
Payer: MEDICARE

## 2025-04-01 ENCOUNTER — HOSPITAL ENCOUNTER (OUTPATIENT)
Facility: HOSPITAL | Age: 75
Discharge: HOME | End: 2025-04-01
Payer: MEDICARE

## 2025-04-01 ENCOUNTER — HOSPITAL ENCOUNTER (OUTPATIENT)
Dept: CARDIOLOGY | Facility: HOSPITAL | Age: 75
Discharge: HOME | End: 2025-04-01
Payer: MEDICARE

## 2025-04-01 VITALS
DIASTOLIC BLOOD PRESSURE: 69 MMHG | RESPIRATION RATE: 18 BRPM | SYSTOLIC BLOOD PRESSURE: 114 MMHG | WEIGHT: 213.2 LBS | BODY MASS INDEX: 30.59 KG/M2 | HEART RATE: 83 BPM | OXYGEN SATURATION: 96 %

## 2025-04-01 DIAGNOSIS — I38 VALVULAR HEART DISEASE: Chronic | ICD-10-CM

## 2025-04-01 DIAGNOSIS — N18.32 STAGE 3B CHRONIC KIDNEY DISEASE: Chronic | ICD-10-CM

## 2025-04-01 DIAGNOSIS — I27.20 PULMONARY HYPERTENSION: Chronic | ICD-10-CM

## 2025-04-01 DIAGNOSIS — Z79.01 PULMONARY EMBOLISM ON LONG-TERM ANTICOAGULATION THERAPY: Chronic | ICD-10-CM

## 2025-04-01 DIAGNOSIS — I26.99 PULMONARY EMBOLISM ON LONG-TERM ANTICOAGULATION THERAPY: Chronic | ICD-10-CM

## 2025-04-01 DIAGNOSIS — Z91.199 POOR COMPLIANCE: ICD-10-CM

## 2025-04-01 DIAGNOSIS — R06.09 DOE (DYSPNEA ON EXERTION): ICD-10-CM

## 2025-04-01 DIAGNOSIS — I42.0 CARDIOMYOPATHY, DILATED, NONISCHEMIC: Primary | Chronic | ICD-10-CM

## 2025-04-01 DIAGNOSIS — Z79.01 LONG TERM (CURRENT) USE OF ANTICOAGULANTS: ICD-10-CM

## 2025-04-01 DIAGNOSIS — I48.91 ATRIAL FIBRILLATION, UNSPECIFIED TYPE: ICD-10-CM

## 2025-04-01 DIAGNOSIS — Z91.89 AT HIGH RISK FOR EXCESS FLUID VOLUME: ICD-10-CM

## 2025-04-01 DIAGNOSIS — I50.42 CHRONIC COMBINED SYSTOLIC AND DIASTOLIC HEART FAILURE: Chronic | ICD-10-CM

## 2025-04-01 PROCEDURE — 93005 ELECTROCARDIOGRAM TRACING: CPT | Performed by: NURSE PRACTITIONER

## 2025-04-01 PROCEDURE — G0463 HOSPITAL OUTPT CLINIC VISIT: HCPCS

## 2025-04-01 RX ORDER — METOPROLOL SUCCINATE 25 MG/1
12.5 TABLET, EXTENDED RELEASE ORAL EVERY 12 HOURS
Qty: 90 TABLET | Refills: 3 | Status: SHIPPED | OUTPATIENT
Start: 2025-04-01

## 2025-04-01 NOTE — PROGRESS NOTES
[facility] HEART FAILURE CLINIC - PHARMACY SERVICE    Patient Name: Guanakito Styles  :1950  Age: 74 y.o.  Sex: male  Primary Cardiologist: Dany  Nephrology: N/A  PCP: None - previously did not have insurance but did not discuss establishing care with PCP at appointment today     HFrEF with EF <20% (Last Echo: 25).    NYHA Class II  B      ECHO:    Results for orders placed during the hospital encounter of 25    Adult Transthoracic Echo Complete w/ Color, Spectral and Contrast if Necessary Per Protocol    Interpretation Summary    Left ventricular ejection fraction appears to be less than 20%.    Indications  Shortness of breath    Technically satisfactory study.  Mitral valve is structurally normal.  Tricuspid valve is structurally normal.  Aortic valve is structurally normal.  Pulmonic valve could not be well visualized.  Significant mitral and tricuspid regurgitation is present.  Significant biatrial enlargement.  Significant left ventricle enlargement and severe and diffuse hypocontractility with ejection fraction of less than 20%.  Grade 3 left ventricular diastolic dysfunction is present.  Left ventricular peak systolic global longitudinal strain is -5%.  Right ventricle is enlarged with hypocontractility.  TAPSE 1.52 cm.  Atrial septum is intact.  Aorta is normal.  IVC dilatation with decreased respiratory variation suggestive of increased right atrial pressure.  No pericardial effusion or intracardiac thrombus is seen.    Impression  Significant mitral and tricuspid regurgitation is present.  Significant biatrial enlargement.  Significant left ventricle enlargement and severe and diffuse hypocontractility with ejection fraction of less than 20%.  Grade 3 left ventricular diastolic dysfunction is present.  Left ventricular peak systolic global longitudinal strain is -5%.  Right ventricle is enlarged with hypocontractility.  TAPSE 1.52 cm.      The patient's last EKG was reviewed from  today and shows a QTcB of 474 ms.     ICD: no    CKD: Stage 3a eGFR 45-59 mL/min    BMP          2/8/2025    00:24 2/24/2025    11:23 3/31/2025    14:59   BMP   BUN 40  32  19    Creatinine 1.70  1.60  1.34    Sodium 137  134  137    Potassium 4.1  4.6  4.1    Chloride 96  93  95    CO2 26.3  26.7  30.1    Calcium 9.0  9.8  9.7        Lab Results   Component Value Date    EGFR 56.3 (L) 03/31/2025    EGFR 45.5 (L) 02/24/2025    EGFR 42.3 (L) 02/08/2025       Lab Results   Component Value Date    PROBNP 3,527.0 (H) 03/31/2025    PROBNP 5,060.0 (H) 02/24/2025    PROBNP 16,707.0 (H) 02/04/2025       Recent Vitals         3/31/2025 3/31/2025 4/1/2025       BP: 99/67 99/67 114/69     Pulse: 80 80 83     Weight: 95.7 kg (211 lb) 95.7 kg (211 lb) 96.7 kg (213 lb 3.2 oz)     BMI (Calculated): 30.3 30.3 30.6              -CHF-specific BB:      Pre Visit Dose: Carvedilol 3.125 mg PO BID    Post Visit Dose: Metoprolol succinate XL 12.5 mg PO BID due to issues with hypotension at home (confirmed on BP log with systolic BP readings in the 80-90s) - HR 83 bpm, /69 mmHg     - Target Dose: Metoprolol succinate  mg PO daily.     - Goal HR of 50s to 60s.     - Patient should be seen every 10 to 14 days for a pulse check with plans for up-titration until target heart rate is achieved.        -ACE/ARB/ARNI:     Pre Visit Dose: Lisinopril 2.5 mg PO daily    Post Visit Dose: Lisinopril 2.5 mg PO daily (/69 mmHg, SCr 1.34 mg/dL)    - Target Dose: Lisinopril 20-40 mg daily    - Patient should have a follow-up appointment every 2 to 4 weeks for a hemodynamic check with possible up-titration to target dose.         -SGLT2 inhibitor therapy:    Pre Visit Dose: Empagliflozin 10 mg PO daily    Post Visit Dose: Empagliflozin 10 mg PO daily    - Target Dose: Empagliflozin 10 mg PO daily : CrCl > 20 mL/min which has shown benefit in patients with HF    -DIURETIC:     Pre Visit Dose: Bumetanide 1 mg PO BID    Post Visit Dose:  Bumetanide 1 mg PO BID     -MRA:     Pre Visit Dose: Spironolactone 12.5 mg daily    Post Visit Dose: Spironolactone 12.5 mg daily    - Target Dose: Spironolactone 25-50 mg PO daily    - K is < 5 mEq/L and SCr is </= 2.5 mg/dL in male and eGFR > 30 mL/min/1.73m3    Lab Results   Component Value Date    K 4.1 03/31/2025       Lab Results   Component Value Date    CREATININE 1.34 (H) 03/31/2025         -MAGNESIUM:     Mag is > 2 mg/dL    Lab Results   Component Value Date    MG 2.4 03/31/2025    MG 2.8 (H) 02/24/2025    MG 2.2 02/07/2025       Pre Visit Dose: None    Post Visit Dose: None      -ANTICOAGULATION:     Warfarin for history of PE and atrial fibrillation - managed by Dr. Saenz's office (most recent appointment was yesterday). Of note, discrepancies between venipuncture INR (1.86) and POCT INR (2.3) readings from yesterday. Secure chat message sent to DARCY Stern to inform her of this.     [id number] SCORE   [id number] Score for Atrial Fibrillation Stroke Risk  Age in Years: 65-74  Sex: Male  CHF History: Yes  Hypertension History: No  Stroke/TIA/Thromboembolism History: Yes  Vascular Disease History: Yes  Diabetes History: No  [id number] Score: 5  Stroke risks -: 5 Points. Risk of ischemic stroke: 7.2%. Risk of stroke/TIA/embolism: 10%    -OTHER CV MEDS:     Pre Visit Dose: None    Post Visit Dose: None    -Clinic Administered Medications:     None         Patient Assistance:      Test claims ran in [facility] for apixaban and rivaroxaban which showed copays of $4.80 for 30-day supply of either. Patient did not have insurance when he was originally started on anticoagulation so DOAC was not an option at that time. Contacted Dr. Saenz to see if he would be agreeable to patient switching anticoagulation. Per Dr. Saenz, okay if patient is agreeable. Discussed with patient and for now patient would like to continue with warfarin. Informed patient that he could talk with Dr. Saenz if he changes his mind.   Dany made aware.            PLAN:  Initiation/Discontinuation/Dose Adjustment:   A. Discontinue carvedilol due to hypotension.  B. Initiate metoprolol succinate XL 12.5 mg PO BID with holding parameters.   Education provided: Discussed medication change with patient and confirmed patient has tablet splitter at home.  Coordination of Care: See Patient Assistance section  Refills: New prescription for metoprolol succinate sent to retail pharmacy      Thank you for allowing me to participate in the care of your patient.    Dafne Hunt, PharmD  [facility] Heart Failure Clinic  04/01/25  15:10 EDT

## 2025-04-05 LAB
QT INTERVAL: 424 MS
QTC INTERVAL: 474 MS

## 2025-04-30 ENCOUNTER — ANTICOAGULATION VISIT (OUTPATIENT)
Dept: CARDIOLOGY | Facility: CLINIC | Age: 75
End: 2025-04-30
Payer: MEDICARE

## 2025-04-30 VITALS
SYSTOLIC BLOOD PRESSURE: 136 MMHG | HEART RATE: 75 BPM | BODY MASS INDEX: 30.28 KG/M2 | DIASTOLIC BLOOD PRESSURE: 61 MMHG | WEIGHT: 211 LBS

## 2025-04-30 DIAGNOSIS — Z79.01 LONG TERM (CURRENT) USE OF ANTICOAGULANTS: Primary | ICD-10-CM

## 2025-04-30 LAB — INR PPP: 2.1 (ref 0.9–1.1)

## 2025-04-30 PROCEDURE — 36416 COLLJ CAPILLARY BLOOD SPEC: CPT | Performed by: INTERNAL MEDICINE

## 2025-04-30 PROCEDURE — 85610 PROTHROMBIN TIME: CPT | Performed by: INTERNAL MEDICINE

## 2025-05-05 DIAGNOSIS — I48.91 ATRIAL FIBRILLATION, UNSPECIFIED TYPE: ICD-10-CM

## 2025-05-05 DIAGNOSIS — Z79.01 LONG TERM (CURRENT) USE OF ANTICOAGULANTS: ICD-10-CM

## 2025-05-05 RX ORDER — WARFARIN SODIUM 3 MG/1
3 TABLET ORAL DAILY
Qty: 30 TABLET | Refills: 2 | Status: SHIPPED | OUTPATIENT
Start: 2025-05-05

## 2025-05-05 NOTE — TELEPHONE ENCOUNTER
Rx Refill Note  Requested Prescriptions     Pending Prescriptions Disp Refills    warfarin (COUMADIN) 3 MG tablet [Pharmacy Med Name: WARFARIN SOD 3MG TABLETS (TAN)] 30 tablet 2     Sig: TAKE 1 TABLET BY MOUTH DAILY   Last INR 4/30/25   Last office visit with prescribing clinician: Visit date not found   Last telemedicine visit with prescribing clinician: Visit date not found   Next office visit with prescribing clinician: 7/2/2025                         Would you like a call back once the refill request has been completed: [] Yes [] No    If the office needs to give you a call back, can they leave a voicemail: [] Yes [] No    Tila Damian RN  05/05/25, 09:31 EDT

## 2025-05-30 ENCOUNTER — ANTICOAGULATION VISIT (OUTPATIENT)
Dept: CARDIOLOGY | Facility: CLINIC | Age: 75
End: 2025-05-30
Payer: MEDICARE

## 2025-05-30 VITALS
WEIGHT: 207 LBS | BODY MASS INDEX: 29.7 KG/M2 | SYSTOLIC BLOOD PRESSURE: 110 MMHG | HEART RATE: 77 BPM | DIASTOLIC BLOOD PRESSURE: 71 MMHG

## 2025-05-30 DIAGNOSIS — Z79.01 LONG TERM (CURRENT) USE OF ANTICOAGULANTS: Primary | ICD-10-CM

## 2025-05-30 LAB — INR PPP: 2.9 (ref 0.9–1.1)

## 2025-05-30 PROCEDURE — 36416 COLLJ CAPILLARY BLOOD SPEC: CPT | Performed by: INTERNAL MEDICINE

## 2025-05-30 PROCEDURE — 85610 PROTHROMBIN TIME: CPT | Performed by: INTERNAL MEDICINE

## 2025-06-09 ENCOUNTER — LAB (OUTPATIENT)
Dept: LAB | Facility: HOSPITAL | Age: 75
End: 2025-06-09
Payer: MEDICARE

## 2025-06-09 DIAGNOSIS — I38 VALVULAR HEART DISEASE: Chronic | ICD-10-CM

## 2025-06-09 DIAGNOSIS — I42.0 CARDIOMYOPATHY, DILATED, NONISCHEMIC: Chronic | ICD-10-CM

## 2025-06-09 DIAGNOSIS — I27.20 PULMONARY HYPERTENSION: Chronic | ICD-10-CM

## 2025-06-09 DIAGNOSIS — R06.09 DOE (DYSPNEA ON EXERTION): ICD-10-CM

## 2025-06-09 LAB
ANION GAP SERPL CALCULATED.3IONS-SCNC: 12 MMOL/L (ref 5–15)
BUN SERPL-MCNC: 19.7 MG/DL (ref 8–23)
BUN/CREAT SERPL: 16 (ref 7–25)
CALCIUM SPEC-SCNC: 9.7 MG/DL (ref 8.6–10.5)
CHLORIDE SERPL-SCNC: 97 MMOL/L (ref 98–107)
CO2 SERPL-SCNC: 27 MMOL/L (ref 22–29)
CREAT SERPL-MCNC: 1.23 MG/DL (ref 0.76–1.27)
EGFRCR SERPLBLD CKD-EPI 2021: 62.4 ML/MIN/1.73
GLUCOSE SERPL-MCNC: 101 MG/DL (ref 65–99)
NT-PROBNP SERPL-MCNC: 1962 PG/ML (ref 0–900)
POTASSIUM SERPL-SCNC: 3.9 MMOL/L (ref 3.5–5.2)
SODIUM SERPL-SCNC: 136 MMOL/L (ref 136–145)

## 2025-06-09 PROCEDURE — 80048 BASIC METABOLIC PNL TOTAL CA: CPT

## 2025-06-09 PROCEDURE — 83880 ASSAY OF NATRIURETIC PEPTIDE: CPT

## 2025-06-09 PROCEDURE — 36415 COLL VENOUS BLD VENIPUNCTURE: CPT

## 2025-06-09 NOTE — PROGRESS NOTES
"  [facility]  P. T. \"Tommy\" REMI Rojas, [facility]        Guanakito Styles is a 74 y.o.     History of Present Illness  74-year-old male patient of Dr. Saenz's with a significant past medical history including being diagnosed apparently with nonischemic dilated cardiomyopathy couple years ago he has both chronic combined systolic and diastolic heart failure (HFrEF, his LVIDD is very large at 7 cm, (TTE 2/5/2025) LVEF less than 20% with grade 3 diastolic dysfunction, previously was supposed to have a LifeVest which he has been noncompliant with for well over a year now, additionally known to have valvular heart disease with moderate MR and TR, as well as moderate to severe pulmonary hypertension with his RVSP around 49 mmHg, he also suffers from stage III chronic kidney disease nearing stage IV, history of atrial fibrillation anticoagulated with Coumadin and rate controlled with Coreg, history of January 2024 having a pulmonary embolism, elevated blood glucose, recently hospitalized on February 4 complaining of dyspnea and bilateral lower extremity edema and ran out of diuretic and anticoagulant at home for quite some time proBNP was at 16,000 chest x-ray of course showed pulmonary vascular congestion repeat TTE showed an EF of about 20% evaluated by nephrology since his CKD had worsened who was agreeable with the addition of Jardiance and ACE or ARB to be started as an outpatient.  Comes in for his initial heart failure visit on 12 February 2025 since recently hospitalized he has not had any excessive swelling in his lower extremities infected continues to go down his breathing has improved somewhat was back for second visit on 1 April 2025 since initial visit is not noted any real improvements in either breathing fatigue etc. comes back on 6/10/2025 since last visit other than some issues with nighttime micturition he has done relatively well not had any volume overload, he has not had any " repeat echocardiogram nor has he seen a pulmonologist nor nephrologist.                     Subjective        Review of Systems -      Constitutional: + weakness, + fatigue persist with minimal if any improvement, No fever, rigors, chills   Eyes: No recent vision changes, eye pain   ENT/oropharynx: No recent difficulty swallowing, sore throat, epistaxis, changes in hearing   Cardiovascular: No recent chest pain, chest tightness, palpitations except as noted in HPI, paroxysmal nocturnal dyspnea, orthopnea, diaphoresis, dizziness & no pre or mark syncopal episodes   Respiratory: No significant shortness of breath at rest, + dyspnea on exertion he is once again is unable to supply me with distance where it occurs, no significant productive cough, no wheezing and no hemoptysis   Gastrointestinal: No abdominal pain, nausea, vomiting, diarrhea, bloody stools   Genitourinary: No hematuria, dysuria other than increased frequency given the recent increase in his diuretics been having some issues with when he is taking the evening dose   Neurological: No change in typical headache, no new onset of any tremors, numbness,  or hemiparesis    Musculoskeletal: No change in typical cramps, myalgias, no new joint pain, joint swelling   Integument: No recent rash, + mild edema on the right lower extremitybut better significantly reduced since recent hospitalization         Patient Active Problem List   Diagnosis    Cardiomegaly    Elevated troponin    Long term (current) use of anticoagulants    Moderate to severe pulmonary hypertension    Valvular heart disease    Cardiomyopathy, dilated, nonischemic    Chronic combined systolic and diastolic heart failure (HFrEF)    Stage 3b chronic kidney disease    At high risk for excess fluid volume    h/o Jan 24 Pulmonary embolism on long-term anticoagulation therapy    Poor compliance    QUINONES (dyspnea on exertion)    3b Atrial fibrillation     family history is not on file.   reports that he  has quit smoking. His smoking use included cigarettes. He has been exposed to tobacco smoke. He has never used smokeless tobacco. He reports current alcohol use of about 1.0 standard drink of alcohol per week. He reports that he does not use drugs.  Allergies   Allergen Reactions    Albuterol Shortness Of Breath     Pt reports that breathing is worse with use    Penicillins Unknown - Low Severity     Physical Activity: Not on file          Current Outpatient Medications:     bumetanide (Bumex) 1 MG tablet, Take 1 tablet by mouth 2 (Two) Times a Day., Disp: 180 tablet, Rfl: 2    empagliflozin (Jardiance) 10 MG tablet tablet, Take 1 tablet by mouth Daily., Disp: 90 tablet, Rfl: 1    lisinopril (PRINIVIL,ZESTRIL) 5 MG tablet, Take 0.5 tablets by mouth Daily. Hold or skip if systolic BP < 100 mmHg, Disp: 90 tablet, Rfl: 2    metoprolol succinate XL (TOPROL-XL) 25 MG 24 hr tablet, Take 0.5 tablets by mouth Every 12 (Twelve) Hours. Skip or hold dose for systolic BP < 100 mmHg or HR < 60 BPM unless you have pacemaker & or ICD, Disp: 90 tablet, Rfl: 3    spironolactone (ALDACTONE) 25 MG tablet, Take 0.5 tablets by mouth Daily., Disp: 45 tablet, Rfl: 2    warfarin (COUMADIN) 3 MG tablet, TAKE 1 TABLET BY MOUTH DAILY, Disp: 14 tablet, Rfl: 0    warfarin (COUMADIN) 3 MG tablet, TAKE 1 TABLET BY MOUTH DAILY, Disp: 30 tablet, Rfl: 2    Objective   Vital Sign Review:   Vitals:    06/10/25 1126   BP: 122/76   Pulse: 55   Resp: 18   SpO2: 99%     Body mass index is 29.82 kg/m².      06/10/25  1126   Weight: 94.3 kg (207 lb 12.8 oz)       Physical Exam:    General:  Well-developed, longhaired, well-nourished, cooperative, no acute distress, appears stated age if not slightly older, generally moves slowly   Neuro:  A&O x3. No significantly obvious focal neuro deficet    Psych:  mildly flattened affect    HENT:  Normocephalic, atraumatic, moist mucous membranes ,.  Bearded normal ear placement,Throat not injected   Eyes:  PERRLA,  Conjunctivae not injected, EOM's intact, wearing spectacles to aid in his vision, conjunctiva does not appear significantly injected   Neck:  Supple, not excessively thickened, no lymph adenopathy nor thyromegaly no JVD or bruit    Lungs:    Symmetrical rise & fall of chest with baseline respiratory pattern. To auscultation lungs are Clear bilaterally, faint late phase expiratory wheeze noted,, no rhonchi or rales are noted, bases bilaterally seem real relatively well aerated   Chest wall:  No significant tenderness when palpated. PMI @ 6th ICS MAL    Heart:   rate in the mid 70s and  regular rhythm, S1 and S2 normal, no S3, there is an obvious S4, Gr III/VI systolic ejection murmur best heard at the apex , no obvious rub, click or gallop.    Abdomen:  non-distended, non-tender, bowel sounds noted in the 4 quadrants of the abdomen, he is without significant central abdominal adipose tissue identified    Extremities:  Equal color motion temperature and sensitivity, Rapid capillary refill noted within the nailbeds of fingers and toes bilaterally trace nonpitting on the right and trace also noted on the left lower extremity edema.    Pulses:  2+ and symmetric all extremities, rapid capillary refill    Skin:  No obvious rashes, significant lesions identified, warm dry and of normal turgor         DATA REVIEWED:   EKG:      ---------------------------------------------------  ECHO:    Results for orders placed during the hospital encounter of 02/04/25    Adult Transthoracic Echo Complete w/ Color, Spectral and Contrast if Necessary Per Protocol    Interpretation Summary    Left ventricular ejection fraction appears to be less than 20%.    Indications  Shortness of breath    Technically satisfactory study.  Mitral valve is structurally normal.  Tricuspid valve is structurally normal.  Aortic valve is structurally normal.  Pulmonic valve could not be well visualized.  Significant mitral and tricuspid regurgitation is  present.  Significant biatrial enlargement.  Significant left ventricle enlargement and severe and diffuse hypocontractility with ejection fraction of less than 20%.  Grade 3 left ventricular diastolic dysfunction is present.  Left ventricular peak systolic global longitudinal strain is -5%.  Right ventricle is enlarged with hypocontractility.  TAPSE 1.52 cm.  Atrial septum is intact.  Aorta is normal.  IVC dilatation with decreased respiratory variation suggestive of increased right atrial pressure.  No pericardial effusion or intracardiac thrombus is seen.    Impression  Significant mitral and tricuspid regurgitation is present.  Significant biatrial enlargement.  Significant left ventricle enlargement and severe and diffuse hypocontractility with ejection fraction of less than 20%.  Grade 3 left ventricular diastolic dysfunction is present.  Left ventricular peak systolic global longitudinal strain is -5%.  Right ventricle is enlarged with hypocontractility.  TAPSE 1.52 cm.          -----------------------------------------------------  RHC/LHC    Results for orders placed during the hospital encounter of 03/06/23    Cardiac Catheterization/Vascular Study    Conclusion  CARDIAC CATHETERIZATION REPORT    DATE OF PROCEDURE:  3/8/2023    INDICATION FOR PROCEDURE:  Acute systolic congestive heart failure  Cardiomyopathy  Shortness of breath  Abnormal stress Cardiolite test    PROCEDURE PERFORMED:  Right heart catheterization left heart catheterization, coronary angiography  Left ventricle angiogram was not performed due to pre-existing renal dysfunction.    @@  Moderate conscious sedation was utilized with intravenous Versed and fentanyl administered by registered nurse with continuous ECG, pulse oximetry and hemodynamic monitoring supervised by myself throughout the entire procedure.  Conscious sedation time   30 minutes    I was present with the patient for the duration of moderate sedation and supervised staff who  had no other duties and monitored the patient for the entire procedure.    @@    Under usual sterile precautions and 1% Xylocaine filtration right femoral vein and femoral artery were percutaneously punctured and a 7 and 5 [place] vascular sheaths were respectively inserted.  [place]-Eliana catheter was used to measure right-sided pressures pulmonary capital wedge pressure was measured.  [place]-Eliana catheter was removed and subsequently left and right coronary arteriography was performed.  Left ventricular pressures were measured with pigtail catheter.  Left ventricle angiogram was not performed due to pre-existing renal dysfunction.  Patient tolerated the procedure well.  Hemostasis was obtained and patient was returned to the room with intact pulses.      FINDINGS:    1. HEMODYNAMICS:  Left ventricle end-diastolic pressure is normal.  Mean right atrial pressure is 8 right ventricle 42/3 pulmonary artery 47/19 mean pulmonary artery 31 and pulmonary capital wedge pressure is 19.    2. LEFT VENTRICULOGRAPHY:  Not performed due to pre-existing renal dysfunction.    3. CORONARY ANGIOGRAPHY:  Left main coronary artery is normal.  Left anterior descending artery is normal.  Circumflex coronary artery is normal.  Right coronary artery is a large and dominant vessel and is normal.    SUMMARY:  Mild pulmonary hypertension.  Left ventricle angiogram was not performed due to pre-existing renal dysfunction.  Patient has an ejection fraction of 10 to 15% (echocardiogram)  Normal epicardial coronary arteries.    RECOMMENDATIONS:  GDMT for nonischemic cardiomyopathy as tolerated.  Intensive but cautious diuresis with close observation of renal function.  Patient would benefit from LifeVest.  Modification of risk factors.  Patient would benefit from ICD if left ventricular function does not improve with GDMT.      ---------------------------------------------------------------------------  CXR/Imaging:     Imaging Results (Most Recent)        None                -----------------------------------------------------------------------------  CT:   No radiology results for the last 30 days.        --------------------------------------------------------------------------------------------------------------    Laboratory evaluations:    Hospital Outpatient Visit on 06/10/2025   Component Date Value    QT Interval 06/10/2025 428     QTC Interval 06/10/2025 458    Lab on 06/09/2025   Component Date Value    Glucose 06/09/2025 101 (H)     BUN 06/09/2025 19.7     Creatinine 06/09/2025 1.23     Sodium 06/09/2025 136     Potassium 06/09/2025 3.9     Chloride 06/09/2025 97 (L)     CO2 06/09/2025 27.0     Calcium 06/09/2025 9.7     BUN/Creatinine Ratio 06/09/2025 16.0     Anion Gap 06/09/2025 12.0     eGFR 06/09/2025 62.4     proBNP 06/09/2025 1,962.0 (H)    Anticoagulation Visit on 05/30/2025   Component Date Value    INR 05/30/2025 2.90 (A)    Anticoagulation Visit on 04/30/2025   Component Date Value    INR 04/30/2025 2.10 (A)    Hospital Outpatient Visit on 04/01/2025   Component Date Value    QT Interval 04/01/2025 424     QTC Interval 04/01/2025 474    Lab on 03/31/2025   Component Date Value    Magnesium 03/31/2025 2.4     proBNP 03/31/2025 3,527.0 (H)     Glucose 03/31/2025 112 (H)     BUN 03/31/2025 19     Creatinine 03/31/2025 1.34 (H)     Sodium 03/31/2025 137     Potassium 03/31/2025 4.1     Chloride 03/31/2025 95 (L)     CO2 03/31/2025 30.1 (H)     Calcium 03/31/2025 9.7     BUN/Creatinine Ratio 03/31/2025 14.2     Anion Gap 03/31/2025 11.9     eGFR 03/31/2025 56.3 (L)     Protime 03/31/2025 21.6     INR 03/31/2025 1.86 (L)    Anticoagulation Visit on 03/31/2025   Component Date Value    INR 03/31/2025 2.30 (A)    Hospital Outpatient Visit on 02/24/2025   Component Date Value    proBNP 02/24/2025 5,060.0 (H)     Glucose 02/24/2025 112 (H)     BUN 02/24/2025 32 (H)     Creatinine 02/24/2025 1.60 (H)     Sodium 02/24/2025 134 (L)     Potassium  02/24/2025 4.6     Chloride 02/24/2025 93 (L)     CO2 02/24/2025 26.7     Calcium 02/24/2025 9.8     BUN/Creatinine Ratio 02/24/2025 20.0     Anion Gap 02/24/2025 14.3     eGFR 02/24/2025 45.5 (L)     TSH 02/24/2025 4.190     Hemoglobin A1C 02/24/2025 6.11 (H)     Magnesium 02/24/2025 2.8 (H)     Protime 02/24/2025 19.2 (L)     INR 02/24/2025 1.61 (L)     Total Protein 02/24/2025 9.0 (H)     Albumin 02/24/2025 4.4     ALT (SGPT) 02/24/2025 62 (H)     AST (SGOT) 02/24/2025 56 (H)     Alkaline Phosphatase 02/24/2025 95     Total Bilirubin 02/24/2025 0.6     Bilirubin, Direct 02/24/2025 0.2     Bilirubin, Indirect 02/24/2025 0.4     Total Cholesterol 02/24/2025 199     Triglycerides 02/24/2025 192 (H)     HDL Cholesterol 02/24/2025 45     LDL Cholesterol  02/24/2025 120 (H)     VLDL Cholesterol 02/24/2025 34     LDL/HDL Ratio 02/24/2025 2.57     WBC 02/24/2025 9.43     RBC 02/24/2025 5.14     Hemoglobin 02/24/2025 14.1     Hematocrit 02/24/2025 45.5     MCV 02/24/2025 88.5     MCH 02/24/2025 27.4     MCHC 02/24/2025 31.0 (L)     RDW 02/24/2025 15.1     RDW-SD 02/24/2025 49.6     MPV 02/24/2025 11.1     Platelets 02/24/2025 290     Neutrophil % 02/24/2025 58.6     Lymphocyte % 02/24/2025 28.1     Monocyte % 02/24/2025 11.5     Eosinophil % 02/24/2025 1.0     Basophil % 02/24/2025 0.5     Immature Grans % 02/24/2025 0.3     Neutrophils, Absolute 02/24/2025 5.53     Lymphocytes, Absolute 02/24/2025 2.65     Monocytes, Absolute 02/24/2025 1.08 (H)     Eosinophils, Absolute 02/24/2025 0.09     Basophils, Absolute 02/24/2025 0.05     Immature Grans, Absolute 02/24/2025 0.03     nRBC 02/24/2025 0.0    Anticoagulation Visit on 02/10/2025   Component Date Value    INR 02/10/2025 2.30 (A)    No results displayed because visit has over 200 results.      There may be more visits with results that are not included.       Result Review:  I have personally reviewed the results from the time of this admission to 6/10/2025 12:12 EDT  and agree with these findings:  [x]  Laboratory list / accordion  []  Microbiology  []  Radiology  [x]  EKG/Telemetry   []  Cardiology/Vascular   []  Pathology  [x]  Old records  []  Other:  Most notable findings include:  eGFR 62 making him stage II CKD otherwise for the most part WNL    ECG shows an NSR 75 with LBBB QTc 458       Diagnoses and all orders for this visit:    1. Cardiomyopathy, dilated, nonischemic (Primary)  Overview:  A.  LVIDD 7 cm  B.  Chronic combined systolic and diastolic heart failure (HFrEF)  C.  (TTE 2/5/25) LVEF <20% with grade 3 diastolic dysfunction  D.  LifeVest recommended March 23 no devices currently    Orders:  -     proBNP; Standing  -     Basic Metabolic Panel; Standing  -     ECG 12 Lead; Future    2. Moderate to severe pulmonary hypertension  Overview:  RVSP 49 mmHg    Orders:  -     proBNP; Standing  -     ECG 12 Lead; Future  -     Ambulatory Referral to Pulmonology    3. Valvular heart disease  Overview:  A moderate to severe MR  B.  Moderate severe TR    Orders:  -     proBNP; Standing    4. Stage 3b chronic kidney disease  Overview:  Followed by -Dr. Gramajo.     Orders:  -     Basic Metabolic Panel; Standing    5. At high risk for excess fluid volume  Overview:  A.  HFrEF  B.  Pulmonary hypertension  C.  Moderate to severe valvular heart disease  D.  Stage IIIb CKD      6. Poor compliance    7. 3a-b atrial fibrillation likely (Paroxysmal)  Overview:  A.  Rate controlled with Coreg  B.  Maintained on Coumadin for AC  D no history of DCCV or cardioversion noted    Orders:  -     ECG 12 Lead; Future    8. QUINONES (dyspnea on exertion)  -     Basic Metabolic Panel; Future  -     proBNP; Standing  -     Basic Metabolic Panel; Standing  -     ECG 12 Lead; Future        NYHA STAGE B; FC-II.  Clinical status was assessed and has remained stable.       Today, Patient appears euvolemic. and with  Moderate perfusion. The patient's hemodynamics are currently acceptable. HR is: normal and   BP/MAP was reviewed and there is not room for medication up-titration due to his hypotension.    The patient's clinical course has been impacted by both renal function and hypotension.    GDMT Assessment: The patient is currently on goal-directed medical therapy.  Given the constraints of his renal insufficiency and hypotension  We do not have room to optimize their medications based on HD assessment today, GFR, and electrolytes.     GDMT changes recommended today: Clarified timing of diuretic and evening      BB:   continue Metoprolol Succinate 12.5 mg bid  Monitor heart rate.  Please call the [facility] for HR<60, dizziness, lightheadedness, syncope    A/A/A:     Continue lisinopril 2.5 mg daily   Occasional monitoring of Chem-7 is recommended;   call for the development of a new cough or S/Sx angioedema    [id number]:  continue Empagliflozin (Jardiance) 10 mg daily  Call for S/Sx genital mycotic infections  Do not take when in adequate oral intake, NPO, GI illness    MRA:  continue Spironolactone 12.5 mg daily  Initiation instructions: Obtain a CHEM7 after 4-5 doses.  We will call with the results.  When he has his RTC visits we will once again assess for for hemodynamict and repeat laboratory monitoring.  Recommended quarterly assessment of GFR and electrolytes for the first 12 months  After the patient has been on MRA therapy for 12 months without high-risk features, recommended q 6 monthly BMPs and assessment, per guidelines.   Avoid potassium supplementation  Avoid salt substitutes containing potassium  Please hold this medication if diarrhea, vomiting, or infection with fever and sweating occurs      Diuretic Regimen:    On bumetanide (BUMEX) 1 mg two times daily     Labs/Diagnostics/Referrals:    Labs - Added proBNP as well as a BMP today with results as noted   Imaging -No orders placed today   Referrals Referral to Pulmonary Medicine   Currently Pending: Patient needs follow-up with Dr. Saenz as scheduled on July 2  as well as with Dr. Gramajo as previously set up       HF-Specific Device Therapy/AHF    ICD/CRT-D  ICD Qualification  Repeat echo April 2025 (p)   Below filled out on initial echo and visit awaiting 90-day repeat echo now that he has been on     [x] GDMT X 3 months  [x] EF < 35%  [] QRS Duration  [x] > 150  [x] LBBB  [] Atrial Fibrillation  [x] NYHA Class 2c  [x] Life expectancy > 1 year   -Awaiting repeat echo  -Indications: CHF  -Deferred due to: Still awaiting repeat echo     TMVR  -I seemingly is indicated  -Deferred due to: Awaiting Dr. Saenz's input given his mitral and tricuspid issues with regurgitation  JFH_EF<35% on last echocardiogram_CHOICES:[id number]}  I will reorder the TTE if not done at next visit with Dr. Saenz given the fact its over 90 days that he has been compliant with GDMT   CardioMEMS Not available at [facility]     Discussion     With known chronic combined systolic diastolic heart failure with a significant diastolic grade 3 component the patient is already on heart failure core measures including beta-blocker, SGLT2, diuretic,  Aldactone   Most effective GDMT would be a ACE, SGLT2, diuretic, and Aldactone  4/1/2025 go ahead and change his Coreg to metoprolol will give 12.5 twice daily with parameters to hold for systolic BP of < 100 and/or heart rate < 60  Since he with an EGFR of previously only 45 would obviously prefer Dr. Gramajo his nephrologist manages diuretics he said 1 follow-up appointment with him  A.  Since last visit there has been some improvement in his eGFR is actually gone up about 4/1/2025  to 56  He obviously needs to continue his follow-up with Dr. Saenz next being seen in July  given the severity of his MR and potentially TR some consideration could be made for notification to the valvular heart clinic for possible mitral clipping versus tricuspid clipping however will defer to Dr. Saenz for the actual consult given he is his primary cardiologist  Per our protocol we  will go ahead and notify the valve coordinator the patient's name and issues  Name once again will be given to the valve coordinator and awaiting Dr. Saenz's input  Might be beneficial to see a pulmonologist regarding his pulmonary hypertension with an RVSP of 49 mmHg   however will make the consult to Dr. Bajwa  And going to continue to encourage compliance given his history of noncompliance has of 4/1/2025 still being compliant on his 6/10/2025 visit he has been compliant here with heart failure clinic  He is rather adamant about not wearing a LifeVest previously  Repeat echocardiogram to be mid Apr was not done we will go ahead and order to see if if there is recovery in his ejection fraction on follow-up echo since it is> 90 days but does have a pending appointment July 2 (2 to 3 weeks) with Dr. Saenz where an echo might be performed if not I will go ahead and order through the heart failure clinic  would certainly recommend ICD if there is no recovery in the EF to greater than 35% and would defer to EP regarding the type of ICD he needs and would allow Dr. Saenz to make the consult to EP  Needs to continue to follow-up with Dr. Gramajo his primary nephrologist and of course did set the appointment with Dr. Bajwa here in June given his pulmonary hypertension    I did the following activities preparing for the visit with Kris Montero  including: reviewing tests, once pt arrived in clinic I also performed a medically appropriate examination and/or evaluation, I personally spent considerable time counseling and educating the patient/family/caregiver, ordering medications, tests, or procedures, referring and communicating with other health care professionals, and documenting information in the medical record. I estimate including preparation 30 minutes          Lifestyle Advice: A hand-out previously has been provided to the patient.   - call office if I gain more than 3 pounds in one day or 5 pounds in one week    - keep legs up while sitting   - use salt in moderation recommended 2 gms, or 2000 mg daily max   - watch for swelling in feet, ankles and legs every day   - weigh daily   -call for concerning S/Sx and/or if you do not get an echocardiogram at your next follow-up appointment with Dr. Saenz   -- activity or exercise based on tolerance encouraged     CODE STATUS: FULL

## 2025-06-10 ENCOUNTER — HOSPITAL ENCOUNTER (OUTPATIENT)
Facility: HOSPITAL | Age: 75
Discharge: HOME | End: 2025-06-10
Payer: MEDICARE

## 2025-06-10 ENCOUNTER — HOSPITAL ENCOUNTER (OUTPATIENT)
Dept: CARDIOLOGY | Facility: HOSPITAL | Age: 75
Discharge: HOME | End: 2025-06-10
Payer: MEDICARE

## 2025-06-10 ENCOUNTER — DISEASE STATE MANAGEMENT VISIT (OUTPATIENT)
Facility: HOSPITAL | Age: 75
End: 2025-06-10
Payer: MEDICARE

## 2025-06-10 VITALS
HEART RATE: 55 BPM | SYSTOLIC BLOOD PRESSURE: 122 MMHG | RESPIRATION RATE: 18 BRPM | OXYGEN SATURATION: 99 % | BODY MASS INDEX: 29.82 KG/M2 | DIASTOLIC BLOOD PRESSURE: 76 MMHG | WEIGHT: 207.8 LBS

## 2025-06-10 DIAGNOSIS — R06.09 DOE (DYSPNEA ON EXERTION): ICD-10-CM

## 2025-06-10 DIAGNOSIS — Z91.89 AT HIGH RISK FOR EXCESS FLUID VOLUME: ICD-10-CM

## 2025-06-10 DIAGNOSIS — N18.32 STAGE 3B CHRONIC KIDNEY DISEASE: Chronic | ICD-10-CM

## 2025-06-10 DIAGNOSIS — I48.0 PAROXYSMAL ATRIAL FIBRILLATION: ICD-10-CM

## 2025-06-10 DIAGNOSIS — I27.20 PULMONARY HYPERTENSION: Chronic | ICD-10-CM

## 2025-06-10 DIAGNOSIS — I42.0 CARDIOMYOPATHY, DILATED, NONISCHEMIC: Chronic | ICD-10-CM

## 2025-06-10 DIAGNOSIS — Z91.199 POOR COMPLIANCE: ICD-10-CM

## 2025-06-10 DIAGNOSIS — I42.0 CARDIOMYOPATHY, DILATED, NONISCHEMIC: Primary | Chronic | ICD-10-CM

## 2025-06-10 DIAGNOSIS — I38 VALVULAR HEART DISEASE: Chronic | ICD-10-CM

## 2025-06-10 LAB
QT INTERVAL: 428 MS
QTC INTERVAL: 458 MS

## 2025-06-10 PROCEDURE — G0463 HOSPITAL OUTPT CLINIC VISIT: HCPCS

## 2025-06-10 PROCEDURE — 93005 ELECTROCARDIOGRAM TRACING: CPT | Performed by: NURSE PRACTITIONER

## 2025-06-10 NOTE — PATIENT INSTRUCTIONS
Will need follow-up predominantly based on whether or not he gets an echocardiogram on his 2 July visit with Dr. Saenz  If not we need to order an echocardiogram and see the patient a then couple weeks following

## 2025-06-10 NOTE — LETTER
"Jojo 10, 2025     Axel Saenz MD  [address] [facility] [place] [address]    Patient: Guanakito Styles   YOB: 1950   Date of Visit: 6/10/2025     Dear Axel Saenz MD:       Saw in follow-up today a patient of years Guanakito Styles  Below are the relevant portions of my assessment and plan of care.  He has a scheduled appointment with you Dr. Saenz July and would likely benefit from a two-dimensional echocardiogram given the fact that he is now has over 90 days of GDMT for his heart failure to evaluate him whether or not he needs an ICD and/or referral to the valvular heart team for his valvular heart disease    If you have questions, please do not hesitate to call me. I look forward to following Guanakito along with you.         Sincerely,        REMI Person        CC: MD Estefani Castorena Rai, Gurpreet Hobbs APRN  06/10/25 1233  [facility]  [facility], [facility]        Guanakito Styles is a 74 y.o.     History of Present Illness  74-year-old male patient of Dr. Saenz's with a significant past medical history including being diagnosed apparently with nonischemic dilated cardiomyopathy couple years ago he has both chronic combined systolic and diastolic heart failure (HFrEF, his LVIDD is very large at 7 cm, (TTE 2/5/2025) LVEF less than 20% with grade 3 diastolic dysfunction, previously was supposed to have a LifeVest which he has been noncompliant with for well over a year now, additionally known to have valvular heart disease with moderate MR and TR, as well as moderate to severe pulmonary hypertension with his RVSP around 49 mmHg, he also suffers from stage III chronic kidney disease nearing stage IV, history of atrial fibrillation anticoagulated with Coumadin and rate controlled with Coreg, history of January 2024 having a pulmonary embolism, elevated blood glucose, recently hospitalized on February 4 " complaining of dyspnea and bilateral lower extremity edema and ran out of diuretic and anticoagulant at home for quite some time proBNP was at 16,000 chest x-ray of course showed pulmonary vascular congestion repeat TTE showed an EF of about 20% evaluated by nephrology since his CKD had worsened who was agreeable with the addition of Jardiance and ACE or ARB to be started as an outpatient.  Comes in for his initial heart failure visit on 12 February 2025 since recently hospitalized he has not had any excessive swelling in his lower extremities infected continues to go down his breathing has improved somewhat was back for second visit on 1 April 2025 since initial visit is not noted any real improvements in either breathing fatigue etc. comes back on 6/10/2025 since last visit other than some issues with nighttime micturition he has done relatively well not had any volume overload, he has not had any repeat echocardiogram nor has he seen a pulmonologist nor nephrologist.                     Subjective       Review of Systems -      Constitutional: + weakness, + fatigue persist with minimal if any improvement, No fever, rigors, chills   Eyes: No recent vision changes, eye pain   ENT/oropharynx: No recent difficulty swallowing, sore throat, epistaxis, changes in hearing   Cardiovascular: No recent chest pain, chest tightness, palpitations except as noted in HPI, paroxysmal nocturnal dyspnea, orthopnea, diaphoresis, dizziness & no pre or mark syncopal episodes   Respiratory: No significant shortness of breath at rest, + dyspnea on exertion he is once again is unable to supply me with distance where it occurs, no significant productive cough, no wheezing and no hemoptysis   Gastrointestinal: No abdominal pain, nausea, vomiting, diarrhea, bloody stools   Genitourinary: No hematuria, dysuria other than increased frequency given the recent increase in his diuretics been having some issues with when he is taking the evening  dose   Neurological: No change in typical headache, no new onset of any tremors, numbness,  or hemiparesis    Musculoskeletal: No change in typical cramps, myalgias, no new joint pain, joint swelling   Integument: No recent rash, + mild edema on the right lower extremitybut better significantly reduced since recent hospitalization         Patient Active Problem List   Diagnosis   • Cardiomegaly   • Elevated troponin   • Long term (current) use of anticoagulants   • Moderate to severe pulmonary hypertension   • Valvular heart disease   • Cardiomyopathy, dilated, nonischemic   • Chronic combined systolic and diastolic heart failure (HFrEF)   • Stage 3b chronic kidney disease   • At high risk for excess fluid volume   • h/o Jan 24 Pulmonary embolism on long-term anticoagulation therapy   • Poor compliance   • QUINONES (dyspnea on exertion)   • 3b Atrial fibrillation     family history is not on file.   reports that he has quit smoking. His smoking use included cigarettes. He has been exposed to tobacco smoke. He has never used smokeless tobacco. He reports current alcohol use of about 1.0 standard drink of alcohol per week. He reports that he does not use drugs.  Allergies   Allergen Reactions   • Albuterol Shortness Of Breath     Pt reports that breathing is worse with use   • Penicillins Unknown - Low Severity     Physical Activity: Not on file          Current Outpatient Medications:   •  bumetanide (Bumex) 1 MG tablet, Take 1 tablet by mouth 2 (Two) Times a Day., Disp: 180 tablet, Rfl: 2  •  empagliflozin (Jardiance) 10 MG tablet tablet, Take 1 tablet by mouth Daily., Disp: 90 tablet, Rfl: 1  •  lisinopril (PRINIVIL,ZESTRIL) 5 MG tablet, Take 0.5 tablets by mouth Daily. Hold or skip if systolic BP < 100 mmHg, Disp: 90 tablet, Rfl: 2  •  metoprolol succinate XL (TOPROL-XL) 25 MG 24 hr tablet, Take 0.5 tablets by mouth Every 12 (Twelve) Hours. Skip or hold dose for systolic BP < 100 mmHg or HR < 60 BPM unless you have  pacemaker & or ICD, Disp: 90 tablet, Rfl: 3  •  spironolactone (ALDACTONE) 25 MG tablet, Take 0.5 tablets by mouth Daily., Disp: 45 tablet, Rfl: 2  •  warfarin (COUMADIN) 3 MG tablet, TAKE 1 TABLET BY MOUTH DAILY, Disp: 14 tablet, Rfl: 0  •  warfarin (COUMADIN) 3 MG tablet, TAKE 1 TABLET BY MOUTH DAILY, Disp: 30 tablet, Rfl: 2    Objective  Vital Sign Review:   Vitals:    06/10/25 1126   BP: 122/76   Pulse: 55   Resp: 18   SpO2: 99%     Body mass index is 29.82 kg/m².      06/10/25  1126   Weight: 94.3 kg (207 lb 12.8 oz)       Physical Exam:    General:  Well-developed, longhaired, well-nourished, cooperative, no acute distress, appears stated age if not slightly older, generally moves slowly   Neuro:  A&O x3. No significantly obvious focal neuro deficet    Psych:  mildly flattened affect    HENT:  Normocephalic, atraumatic, moist mucous membranes ,.  Bearded normal ear placement,Throat not injected   Eyes:  PERRLA, Conjunctivae not injected, EOM's intact, wearing spectacles to aid in his vision, conjunctiva does not appear significantly injected   Neck:  Supple, not excessively thickened, no lymph adenopathy nor thyromegaly no JVD or bruit    Lungs:    Symmetrical rise & fall of chest with baseline respiratory pattern. To auscultation lungs are Clear bilaterally, faint late phase expiratory wheeze noted,, no rhonchi or rales are noted, bases bilaterally seem real relatively well aerated   Chest wall:  No significant tenderness when palpated. PMI @ 6th ICS MAL    Heart:   rate in the mid 70s and  regular rhythm, S1 and S2 normal, no S3, there is an obvious S4, Gr III/VI systolic ejection murmur best heard at the apex , no obvious rub, click or gallop.    Abdomen:  non-distended, non-tender, bowel sounds noted in the 4 quadrants of the abdomen, he is without significant central abdominal adipose tissue identified    Extremities:  Equal color motion temperature and sensitivity, Rapid capillary refill noted within the  nailbeds of fingers and toes bilaterally trace nonpitting on the right and trace also noted on the left lower extremity edema.    Pulses:  2+ and symmetric all extremities, rapid capillary refill    Skin:  No obvious rashes, significant lesions identified, warm dry and of normal turgor         DATA REVIEWED:   EKG:      ---------------------------------------------------  ECHO:    Results for orders placed during the hospital encounter of 02/04/25    Adult Transthoracic Echo Complete w/ Color, Spectral and Contrast if Necessary Per Protocol    Interpretation Summary  •  Left ventricular ejection fraction appears to be less than 20%.    Indications  Shortness of breath    Technically satisfactory study.  Mitral valve is structurally normal.  Tricuspid valve is structurally normal.  Aortic valve is structurally normal.  Pulmonic valve could not be well visualized.  Significant mitral and tricuspid regurgitation is present.  Significant biatrial enlargement.  Significant left ventricle enlargement and severe and diffuse hypocontractility with ejection fraction of less than 20%.  Grade 3 left ventricular diastolic dysfunction is present.  Left ventricular peak systolic global longitudinal strain is -5%.  Right ventricle is enlarged with hypocontractility.  TAPSE 1.52 cm.  Atrial septum is intact.  Aorta is normal.  IVC dilatation with decreased respiratory variation suggestive of increased right atrial pressure.  No pericardial effusion or intracardiac thrombus is seen.    Impression  Significant mitral and tricuspid regurgitation is present.  Significant biatrial enlargement.  Significant left ventricle enlargement and severe and diffuse hypocontractility with ejection fraction of less than 20%.  Grade 3 left ventricular diastolic dysfunction is present.  Left ventricular peak systolic global longitudinal strain is -5%.  Right ventricle is enlarged with hypocontractility.  TAPSE 1.52  cm.          -----------------------------------------------------  RHC/LHC    Results for orders placed during the hospital encounter of 03/06/23    Cardiac Catheterization/Vascular Study    Conclusion  CARDIAC CATHETERIZATION REPORT    DATE OF PROCEDURE:  3/8/2023    INDICATION FOR PROCEDURE:  Acute systolic congestive heart failure  Cardiomyopathy  Shortness of breath  Abnormal stress Cardiolite test    PROCEDURE PERFORMED:  Right heart catheterization left heart catheterization, coronary angiography  Left ventricle angiogram was not performed due to pre-existing renal dysfunction.    @@  Moderate conscious sedation was utilized with intravenous Versed and fentanyl administered by registered nurse with continuous ECG, pulse oximetry and hemodynamic monitoring supervised by myself throughout the entire procedure.  Conscious sedation time   30 minutes    I was present with the patient for the duration of moderate sedation and supervised staff who had no other duties and monitored the patient for the entire procedure.    @@    Under usual sterile precautions and 1% Xylocaine filtration right femoral vein and femoral artery were percutaneously punctured and a 7 and 5 Romanian vascular sheaths were respectively inserted.  Roscoe-Eliana catheter was used to measure right-sided pressures pulmonary capital wedge pressure was measured.  Roscoe-Eliana catheter was removed and subsequently left and right coronary arteriography was performed.  Left ventricular pressures were measured with pigtail catheter.  Left ventricle angiogram was not performed due to pre-existing renal dysfunction.  Patient tolerated the procedure well.  Hemostasis was obtained and patient was returned to the room with intact pulses.      FINDINGS:    1. HEMODYNAMICS:  Left ventricle end-diastolic pressure is normal.  Mean right atrial pressure is 8 right ventricle 42/3 pulmonary artery 47/19 mean pulmonary artery 31 and pulmonary capital wedge pressure is  19.    2. LEFT VENTRICULOGRAPHY:  Not performed due to pre-existing renal dysfunction.    3. CORONARY ANGIOGRAPHY:  Left main coronary artery is normal.  Left anterior descending artery is normal.  Circumflex coronary artery is normal.  Right coronary artery is a large and dominant vessel and is normal.    SUMMARY:  Mild pulmonary hypertension.  Left ventricle angiogram was not performed due to pre-existing renal dysfunction.  Patient has an ejection fraction of 10 to 15% (echocardiogram)  Normal epicardial coronary arteries.    RECOMMENDATIONS:  GDMT for nonischemic cardiomyopathy as tolerated.  Intensive but cautious diuresis with close observation of renal function.  Patient would benefit from LifeVest.  Modification of risk factors.  Patient would benefit from ICD if left ventricular function does not improve with GDMT.      ---------------------------------------------------------------------------  CXR/Imaging:     Imaging Results (Most Recent)       None                -----------------------------------------------------------------------------  CT:   No radiology results for the last 30 days.        --------------------------------------------------------------------------------------------------------------    Laboratory evaluations:    Hospital Outpatient Visit on 06/10/2025   Component Date Value   • QT Interval 06/10/2025 428    • QTC Interval 06/10/2025 458    Lab on 06/09/2025   Component Date Value   • Glucose 06/09/2025 101 (H)    • BUN 06/09/2025 19.7    • Creatinine 06/09/2025 1.23    • Sodium 06/09/2025 136    • Potassium 06/09/2025 3.9    • Chloride 06/09/2025 97 (L)    • CO2 06/09/2025 27.0    • Calcium 06/09/2025 9.7    • BUN/Creatinine Ratio 06/09/2025 16.0    • Anion Gap 06/09/2025 12.0    • eGFR 06/09/2025 62.4    • proBNP 06/09/2025 1,962.0 (H)    Anticoagulation Visit on 05/30/2025   Component Date Value   • INR 05/30/2025 2.90 (A)    Anticoagulation Visit on 04/30/2025   Component Date  Value   • INR 04/30/2025 2.10 (A)    Hospital Outpatient Visit on 04/01/2025   Component Date Value   • QT Interval 04/01/2025 424    • QTC Interval 04/01/2025 474    Lab on 03/31/2025   Component Date Value   • Magnesium 03/31/2025 2.4    • proBNP 03/31/2025 3,527.0 (H)    • Glucose 03/31/2025 112 (H)    • BUN 03/31/2025 19    • Creatinine 03/31/2025 1.34 (H)    • Sodium 03/31/2025 137    • Potassium 03/31/2025 4.1    • Chloride 03/31/2025 95 (L)    • CO2 03/31/2025 30.1 (H)    • Calcium 03/31/2025 9.7    • BUN/Creatinine Ratio 03/31/2025 14.2    • Anion Gap 03/31/2025 11.9    • eGFR 03/31/2025 56.3 (L)    • Protime 03/31/2025 21.6    • INR 03/31/2025 1.86 (L)    Anticoagulation Visit on 03/31/2025   Component Date Value   • INR 03/31/2025 2.30 (A)    Hospital Outpatient Visit on 02/24/2025   Component Date Value   • proBNP 02/24/2025 5,060.0 (H)    • Glucose 02/24/2025 112 (H)    • BUN 02/24/2025 32 (H)    • Creatinine 02/24/2025 1.60 (H)    • Sodium 02/24/2025 134 (L)    • Potassium 02/24/2025 4.6    • Chloride 02/24/2025 93 (L)    • CO2 02/24/2025 26.7    • Calcium 02/24/2025 9.8    • BUN/Creatinine Ratio 02/24/2025 20.0    • Anion Gap 02/24/2025 14.3    • eGFR 02/24/2025 45.5 (L)    • TSH 02/24/2025 4.190    • Hemoglobin A1C 02/24/2025 6.11 (H)    • Magnesium 02/24/2025 2.8 (H)    • Protime 02/24/2025 19.2 (L)    • INR 02/24/2025 1.61 (L)    • Total Protein 02/24/2025 9.0 (H)    • Albumin 02/24/2025 4.4    • ALT (SGPT) 02/24/2025 62 (H)    • AST (SGOT) 02/24/2025 56 (H)    • Alkaline Phosphatase 02/24/2025 95    • Total Bilirubin 02/24/2025 0.6    • Bilirubin, Direct 02/24/2025 0.2    • Bilirubin, Indirect 02/24/2025 0.4    • Total Cholesterol 02/24/2025 199    • Triglycerides 02/24/2025 192 (H)    • HDL Cholesterol 02/24/2025 45    • LDL Cholesterol  02/24/2025 120 (H)    • VLDL Cholesterol 02/24/2025 34    • LDL/HDL Ratio 02/24/2025 2.57    • WBC 02/24/2025 9.43    • RBC 02/24/2025 5.14    • Hemoglobin  02/24/2025 14.1    • Hematocrit 02/24/2025 45.5    • MCV 02/24/2025 88.5    • MCH 02/24/2025 27.4    • MCHC 02/24/2025 31.0 (L)    • RDW 02/24/2025 15.1    • RDW-SD 02/24/2025 49.6    • MPV 02/24/2025 11.1    • Platelets 02/24/2025 290    • Neutrophil % 02/24/2025 58.6    • Lymphocyte % 02/24/2025 28.1    • Monocyte % 02/24/2025 11.5    • Eosinophil % 02/24/2025 1.0    • Basophil % 02/24/2025 0.5    • Immature Grans % 02/24/2025 0.3    • Neutrophils, Absolute 02/24/2025 5.53    • Lymphocytes, Absolute 02/24/2025 2.65    • Monocytes, Absolute 02/24/2025 1.08 (H)    • Eosinophils, Absolute 02/24/2025 0.09    • Basophils, Absolute 02/24/2025 0.05    • Immature Grans, Absolute 02/24/2025 0.03    • nRBC 02/24/2025 0.0    Anticoagulation Visit on 02/10/2025   Component Date Value   • INR 02/10/2025 2.30 (A)    No results displayed because visit has over 200 results.      There may be more visits with results that are not included.       Result Review:  I have personally reviewed the results from the time of this admission to 6/10/2025 12:12 EDT and agree with these findings:  [x]  Laboratory list / accordion  []  Microbiology  []  Radiology  [x]  EKG/Telemetry   []  Cardiology/Vascular   []  Pathology  [x]  Old records  []  Other:  Most notable findings include:  eGFR 62 making him stage II CKD otherwise for the most part WNL    ECG shows an NSR 75 with LBBB QTc 458       Diagnoses and all orders for this visit:    1. Cardiomyopathy, dilated, nonischemic (Primary)  Overview:  A.  LVIDD 7 cm  B.  Chronic combined systolic and diastolic heart failure (HFrEF)  C.  (TTE 2/5/25) LVEF <20% with grade 3 diastolic dysfunction  D.  LifeVest recommended March 23 no devices currently    Orders:  -     proBNP; Standing  -     Basic Metabolic Panel; Standing  -     ECG 12 Lead; Future    2. Moderate to severe pulmonary hypertension  Overview:  RVSP 49 mmHg    Orders:  -     proBNP; Standing  -     ECG 12 Lead; Future  -     Ambulatory  Referral to Pulmonology    3. Valvular heart disease  Overview:  A moderate to severe MR  B.  Moderate severe TR    Orders:  -     proBNP; Standing    4. Stage 3b chronic kidney disease  Overview:  Followed by -Dr. Gramajo.     Orders:  -     Basic Metabolic Panel; Standing    5. At high risk for excess fluid volume  Overview:  A.  HFrEF  B.  Pulmonary hypertension  C.  Moderate to severe valvular heart disease  D.  Stage IIIb CKD      6. Poor compliance    7. 3a-b atrial fibrillation likely (Paroxysmal)  Overview:  A.  Rate controlled with Coreg  B.  Maintained on Coumadin for AC  D no history of DCCV or cardioversion noted    Orders:  -     ECG 12 Lead; Future    8. QUINONES (dyspnea on exertion)  -     Basic Metabolic Panel; Future  -     proBNP; Standing  -     Basic Metabolic Panel; Standing  -     ECG 12 Lead; Future        NYHA STAGE B; FC-II.  Clinical status was assessed and has remained stable.       Today, Patient appears euvolemic. and with  Moderate perfusion. The patient's hemodynamics are currently acceptable. HR is: normal and  BP/MAP was reviewed and there is not room for medication up-titration due to his hypotension.    The patient's clinical course has been impacted by both renal function and hypotension.    GDMT Assessment: The patient is currently on goal-directed medical therapy.  Given the constraints of his renal insufficiency and hypotension  We do not have room to optimize their medications based on HD assessment today, GFR, and electrolytes.     GDMT changes recommended today: Clarified timing of diuretic and evening      BB:   continue Metoprolol Succinate 12.5 mg bid  Monitor heart rate.  Please call the [facility] for HR<60, dizziness, lightheadedness, syncope    A/A/A:     Continue lisinopril 2.5 mg daily   Occasional monitoring of Chem-7 is recommended;   call for the development of a new cough or S/Sx angioedema    [id number]:  continue Empagliflozin (Jardiance) 10 mg daily  Call for S/Sx genital  mycotic infections  Do not take when in adequate oral intake, NPO, GI illness    MRA:  continue Spironolactone 12.5 mg daily  Initiation instructions: Obtain a CHEM7 after 4-5 doses.  We will call with the results.  When he has his RTC visits we will once again assess for for hemodynamict and repeat laboratory monitoring.  Recommended quarterly assessment of GFR and electrolytes for the first 12 months  After the patient has been on MRA therapy for 12 months without high-risk features, recommended q 6 monthly BMPs and assessment, per guidelines.   Avoid potassium supplementation  Avoid salt substitutes containing potassium  Please hold this medication if diarrhea, vomiting, or infection with fever and sweating occurs      Diuretic Regimen:    On bumetanide (BUMEX) 1 mg two times daily     Labs/Diagnostics/Referrals:    Labs - Added proBNP as well as a BMP today with results as noted   Imaging -No orders placed today   Referrals Referral to Pulmonary Medicine   Currently Pending: Patient needs follow-up with Dr. Saenz as scheduled on July 2 as well as with Dr. Gramajo as previously set up       HF-Specific Device Therapy/AHF    ICD/CRT-D  ICD Qualification  Repeat echo April 2025 (p)   Below filled out on initial echo and visit awaiting 90-day repeat echo now that he has been on     [x] GDMT X 3 months  [x] EF < 35%  [] QRS Duration  [x] > 150  [x] LBBB  [] Atrial Fibrillation  [x] NYHA Class 2c  [x] Life expectancy > 1 year   -Awaiting repeat echo  -Indications: CHF  -Deferred due to: Still awaiting repeat echo     TMVR  -I seemingly is indicated  -Deferred due to: Awaiting Dr. Saenz's input given his mitral and tricuspid issues with regurgitation  [facility]_EF<35% on last echocardiogram_CHOICES:[id number]}  I will reorder the TTE if not done at next visit with Dr. Saenz given the fact its over 90 days that he has been compliant with GDMT   CardioMEMS Not available at [facility]     Discussion     With known chronic  combined systolic diastolic heart failure with a significant diastolic grade 3 component the patient is already on heart failure core measures including beta-blocker, SGLT2, diuretic,  Aldactone   Most effective GDMT would be a ACE, SGLT2, diuretic, and Aldactone  4/1/2025 go ahead and change his Coreg to metoprolol will give 12.5 twice daily with parameters to hold for systolic BP of < 100 and/or heart rate < 60  Since he with an EGFR of previously only 45 would obviously prefer Dr. Gramajo his nephrologist manages diuretics he said 1 follow-up appointment with him  A.  Since last visit there has been some improvement in his eGFR is actually gone up about 4/1/2025  to 56  He obviously needs to continue his follow-up with Dr. Saenz next being seen in July  given the severity of his MR and potentially TR some consideration could be made for notification to the valvular heart clinic for possible mitral clipping versus tricuspid clipping however will defer to Dr. Saenz for the actual consult given he is his primary cardiologist  Per our protocol we will go ahead and notify the valve coordinator the patient's name and issues  Name once again will be given to the valve coordinator and awaiting Dr. Saenz's input  Might be beneficial to see a pulmonologist regarding his pulmonary hypertension with an RVSP of 49 mmHg   however will make the consult to Dr. Bajwa  And going to continue to encourage compliance given his history of noncompliance has of 4/1/2025 still being compliant on his 6/10/2025 visit he has been compliant here with heart failure clinic  He is rather adamant about not wearing a LifeVest previously  Repeat echocardiogram to be mid Apr was not done we will go ahead and order to see if if there is recovery in his ejection fraction on follow-up echo since it is> 90 days but does have a pending appointment July 2 (2 to 3 weeks) with Dr. Saenz where an echo might be performed if not I will go ahead and order through  the heart failure clinic  would certainly recommend ICD if there is no recovery in the EF to greater than 35% and would defer to EP regarding the type of ICD he needs and would allow Dr. Saenz to make the consult to EP  Needs to continue to follow-up with Dr. Gramajo his primary nephrologist and of course did set the appointment with Dr. Bajwa here in June given his pulmonary hypertension    I did the following activities preparing for the visit with Kris Montero  including: reviewing tests, once pt arrived in clinic I also performed a medically appropriate examination and/or evaluation, I personally spent considerable time counseling and educating the patient/family/caregiver, ordering medications, tests, or procedures, referring and communicating with other health care professionals, and documenting information in the medical record. I estimate including preparation 30 minutes          Lifestyle Advice: A hand-out previously has been provided to the patient.   - call office if I gain more than 3 pounds in one day or 5 pounds in one week   - keep legs up while sitting   - use salt in moderation recommended 2 gms, or 2000 mg daily max   - watch for swelling in feet, ankles and legs every day   - weigh daily   -call for concerning S/Sx and/or if you do not get an echocardiogram at your next follow-up appointment with Dr. Saenz   -- activity or exercise based on tolerance encouraged     CODE STATUS: FULL

## 2025-06-10 NOTE — PROGRESS NOTES
[facility] HEART FAILURE CLINIC - PHARMACY SERVICE    Patient Name: Guanakito Styles  :1950  Age: 74 y.o.  Sex: male  Primary Cardiologist: Dany (25)  Nephrology: N/A  PCP: Farhat     HFrEF with EF <20% (Last Echo: 25) (due for repeat echo. Heart failure clinic to order if not ordered upon seeing cardiology in one month).    NYHA Class II B     ECHO:    Results for orders placed during the hospital encounter of 25    Adult Transthoracic Echo Complete w/ Color, Spectral and Contrast if Necessary Per Protocol    Interpretation Summary    Left ventricular ejection fraction appears to be less than 20%.    Indications  Shortness of breath    Technically satisfactory study.  Mitral valve is structurally normal.  Tricuspid valve is structurally normal.  Aortic valve is structurally normal.  Pulmonic valve could not be well visualized.  Significant mitral and tricuspid regurgitation is present.  Significant biatrial enlargement.  Significant left ventricle enlargement and severe and diffuse hypocontractility with ejection fraction of less than 20%.  Grade 3 left ventricular diastolic dysfunction is present.  Left ventricular peak systolic global longitudinal strain is -5%.  Right ventricle is enlarged with hypocontractility.  TAPSE 1.52 cm.  Atrial septum is intact.  Aorta is normal.  IVC dilatation with decreased respiratory variation suggestive of increased right atrial pressure.  No pericardial effusion or intracardiac thrombus is seen.    Impression  Significant mitral and tricuspid regurgitation is present.  Significant biatrial enlargement.  Significant left ventricle enlargement and severe and diffuse hypocontractility with ejection fraction of less than 20%.  Grade 3 left ventricular diastolic dysfunction is present.  Left ventricular peak systolic global longitudinal strain is -5%.  Right ventricle is enlarged with hypocontractility.  TAPSE 1.52 cm.      The patient's last EKG was  reviewed from 6/10/25 and shows a QTcB of 459 ms.     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     BMP          2/24/2025    11:23 3/31/2025    14:59 6/9/2025    14:57   BMP   BUN 32  19  19.7    Creatinine 1.60  1.34  1.23    Sodium 134  137  136    Potassium 4.6  4.1  3.9    Chloride 93  95  97    CO2 26.7  30.1  27.0    Calcium 9.8  9.7  9.7        Lab Results   Component Value Date    EGFR 62.4 06/09/2025    EGFR 56.3 (L) 03/31/2025    EGFR 45.5 (L) 02/24/2025       Lab Results   Component Value Date    PROBNP 1,962.0 (H) 06/09/2025    PROBNP 3,527.0 (H) 03/31/2025    PROBNP 5,060.0 (H) 02/24/2025       Recent Vitals         4/30/2025 5/30/2025 6/10/2025       BP: 136/61 110/71 122/76     Pulse: 75 77 55         EKG ordered, on EKG HR 69     Weight: 95.7 kg (211 lb) 93.9 kg (207 lb) 94.3 kg (207 lb 12.8 oz)     BMI (Calculated): 30.3 -- --              -CHF-specific BB:      Pre Visit Dose: Metoprolol succinate XL 12.5 mg PO BID    Post Visit Dose: Metoprolol succinate XL 12.5 mg PO BID     - Target Dose: Metoprolol succinate  mg PO daily.     - Goal HR of 50s to 60s.     - Patient should be seen every 10 to 14 days for a pulse check with plans for up-titration until target heart rate is achieved.        -ACE/ARB/ARNI:     Pre Visit Dose: Lisinopril 2.5 mg PO daily    Post Visit Dose: Lisinopril 2.5 mg PO daily    - Target Dose: Lisinopril 20-40 mg daily    - Patient should have a follow-up appointment every 2 to 4 weeks for a hemodynamic check with possible up-titration to target dose.         -SGLT2 inhibitor therapy:    Pre Visit Dose: Empagliflozin 10 mg PO daily    Post Visit Dose: Empagliflozin 10 mg PO daily    - Target Dose: Empagliflozin 10 mg PO daily : CrCl > 20 mL/min which has shown benefit in patients with HF    -DIURETIC:     Pre Visit Dose: Bumetanide 1 mg PO BID    Post Visit Dose: Bumetanide 1 mg PO BID     -MRA:     Pre Visit Dose: Spironolactone 12.5 mg daily    Post  Visit Dose: Spironolactone 12.5 mg daily    - Target Dose: Spironolactone 25-50 mg PO daily    - K is < 5 mEq/L and SCr is </= 2.5 mg/dL in male and eGFR > 30 mL/min/1.73m3    Lab Results   Component Value Date    K 3.9 06/09/2025       Lab Results   Component Value Date    CREATININE 1.23 06/09/2025         -MAGNESIUM:     Mag not assessed at this visit.     Lab Results   Component Value Date    MG 2.4 03/31/2025    MG 2.8 (H) 02/24/2025    MG 2.2 02/07/2025       Pre Visit Dose: None    Post Visit Dose: None      -ANTICOAGULATION:     Warfarin managed by Dr. Saenz's office. Last INR within range on 5/30.    [id number] SCORE   [id number] Score for Atrial Fibrillation Stroke Risk  Age in Years: 65-74  Sex: Male  CHF History: Yes  Hypertension History: No  Stroke/TIA/Thromboembolism History: No  Vascular Disease History: Yes  Diabetes History: No  [id number] Score: 3  Stroke risks -: 3 Points. Risk of ischemic stroke: 3.2%. Risk of stroke/TIA/embolism: 4.6%    -OTHER CV MEDS:     Pre Visit Dose: None    Post Visit Dose: None    -Clinic Administered Medications:     None         Patient Assistance:      None            PLAN:  Initiation/Discontinuation/Dose Adjustment: No medication adjustments  Education provided: None  Coordination of Care: Patient due for repeat echo per heart failure APRN. Patient asked to let clinic know if not scheduled after upcoming cardiology appointment, and clinic will order one.   Refills: None  Follow-up: Patient to be seen 2 weeks following echo if it is scheduled soon, or in two months. No appointment currently scheduled pending plan following upcoming cardiology appointment.       Thank you for allowing me to participate in the care of your patient.    Claudia Pavon, Jocelin  [facility]  06/10/25  12:50 EDT

## 2025-07-02 ENCOUNTER — ANTICOAGULATION VISIT (OUTPATIENT)
Dept: CARDIOLOGY | Facility: CLINIC | Age: 75
End: 2025-07-02
Payer: MEDICARE

## 2025-07-02 VITALS
SYSTOLIC BLOOD PRESSURE: 135 MMHG | BODY MASS INDEX: 29.7 KG/M2 | DIASTOLIC BLOOD PRESSURE: 67 MMHG | WEIGHT: 207 LBS | HEART RATE: 68 BPM

## 2025-07-02 DIAGNOSIS — Z79.01 LONG TERM (CURRENT) USE OF ANTICOAGULANTS: Primary | ICD-10-CM

## 2025-07-02 LAB — INR PPP: 2.2 (ref 0.9–1.1)

## 2025-07-02 PROCEDURE — 36416 COLLJ CAPILLARY BLOOD SPEC: CPT | Performed by: INTERNAL MEDICINE

## 2025-07-02 PROCEDURE — 85610 PROTHROMBIN TIME: CPT | Performed by: INTERNAL MEDICINE

## 2025-07-18 ENCOUNTER — TELEPHONE (OUTPATIENT)
Facility: HOSPITAL | Age: 75
End: 2025-07-18
Payer: MEDICARE

## 2025-07-18 NOTE — TELEPHONE ENCOUNTER
Heart Failure Clinic: [facility]  Clinical Outreach     Guanakito Styles is a 75 y.o. male and is a patient of the [facility] heart failure clinic.   Called patient x2 to schedule next appointment for 2 weeks after cardiologist appt.  No answer.  VM left x2.         Clara Hdez RN  [facility]  7/18/2025  12:10 EDT

## 2025-08-06 DIAGNOSIS — Z79.01 LONG TERM (CURRENT) USE OF ANTICOAGULANTS: ICD-10-CM

## 2025-08-06 DIAGNOSIS — I48.91 ATRIAL FIBRILLATION, UNSPECIFIED TYPE: ICD-10-CM

## 2025-08-07 RX ORDER — WARFARIN SODIUM 3 MG/1
TABLET ORAL
Qty: 30 TABLET | Refills: 2 | Status: SHIPPED | OUTPATIENT
Start: 2025-08-07

## 2025-08-13 ENCOUNTER — ANTICOAGULATION VISIT (OUTPATIENT)
Dept: CARDIOLOGY | Facility: CLINIC | Age: 75
End: 2025-08-13
Payer: MEDICARE

## 2025-08-13 ENCOUNTER — TELEPHONE (OUTPATIENT)
Dept: CARDIOLOGY | Facility: CLINIC | Age: 75
End: 2025-08-13

## 2025-08-13 VITALS
DIASTOLIC BLOOD PRESSURE: 68 MMHG | HEART RATE: 70 BPM | WEIGHT: 213 LBS | BODY MASS INDEX: 30.56 KG/M2 | SYSTOLIC BLOOD PRESSURE: 115 MMHG

## 2025-08-13 DIAGNOSIS — Z79.01 LONG TERM (CURRENT) USE OF ANTICOAGULANTS: Primary | ICD-10-CM

## 2025-08-13 LAB — INR PPP: 2.4 (ref 2–3)

## 2025-08-13 PROCEDURE — 85610 PROTHROMBIN TIME: CPT | Performed by: INTERNAL MEDICINE

## 2025-08-13 PROCEDURE — 36416 COLLJ CAPILLARY BLOOD SPEC: CPT | Performed by: INTERNAL MEDICINE

## (undated) DEVICE — PK TRY HEART CATH 50

## (undated) DEVICE — CATH DIAG IMPULSE PIG 5F 100CM

## (undated) DEVICE — CATH DIAG IMPULSE FL4 5F 100CM

## (undated) DEVICE — PINNACLE INTRODUCER SHEATH: Brand: PINNACLE

## (undated) DEVICE — GW FC J TFE/COAT .025 3MM 180CM

## (undated) DEVICE — SWAN-GANZ TRUE SIZE THERMODILUTION "S" TIP: Brand: SWAN-GANZ TRUE SIZE

## (undated) DEVICE — CATH DIAG IMPULSE FR4 5F 100CM